# Patient Record
Sex: MALE | Race: WHITE | NOT HISPANIC OR LATINO | Employment: FULL TIME | URBAN - METROPOLITAN AREA
[De-identification: names, ages, dates, MRNs, and addresses within clinical notes are randomized per-mention and may not be internally consistent; named-entity substitution may affect disease eponyms.]

---

## 2020-01-02 ENCOUNTER — HOSPITAL ENCOUNTER (EMERGENCY)
Facility: HOSPITAL | Age: 35
Discharge: HOME | End: 2020-01-03
Attending: EMERGENCY MEDICINE | Admitting: EMERGENCY MEDICINE

## 2020-01-02 ENCOUNTER — APPOINTMENT (OUTPATIENT)
Dept: GENERAL RADIOLOGY | Facility: HOSPITAL | Age: 35
End: 2020-01-02

## 2020-01-02 DIAGNOSIS — J18.9 PNEUMONIA OF LEFT LOWER LOBE DUE TO INFECTIOUS ORGANISM: Primary | ICD-10-CM

## 2020-01-02 DIAGNOSIS — R10.9 ABDOMINAL PAIN, UNSPECIFIED ABDOMINAL LOCATION: ICD-10-CM

## 2020-01-02 LAB
ALBUMIN SERPL-MCNC: 4 G/DL (ref 3.5–5.2)
ALBUMIN/GLOB SERPL: 1.2 G/DL
ALP SERPL-CCNC: 74 U/L (ref 39–117)
ALT SERPL W P-5'-P-CCNC: 28 U/L (ref 1–41)
ANION GAP SERPL CALCULATED.3IONS-SCNC: 12 MMOL/L (ref 5–15)
APTT PPP: 23.6 SECONDS (ref 24–31)
AST SERPL-CCNC: 21 U/L (ref 1–40)
BILIRUB SERPL-MCNC: 0.2 MG/DL (ref 0.2–1.2)
BILIRUB UR QL STRIP: NEGATIVE
BUN BLD-MCNC: 18 MG/DL (ref 6–20)
BUN/CREAT SERPL: 17 (ref 7–25)
CALCIUM SPEC-SCNC: 9.2 MG/DL (ref 8.6–10.5)
CHLORIDE SERPL-SCNC: 100 MMOL/L (ref 98–107)
CLARITY UR: CLEAR
CO2 SERPL-SCNC: 27 MMOL/L (ref 22–29)
COLOR UR: YELLOW
CREAT BLD-MCNC: 1.06 MG/DL (ref 0.76–1.27)
GFR SERPL CREATININE-BSD FRML MDRD: 80 ML/MIN/1.73
GFR SERPL CREATININE-BSD FRML MDRD: 96 ML/MIN/1.73
GLOBULIN UR ELPH-MCNC: 3.4 GM/DL
GLUCOSE BLD-MCNC: 140 MG/DL (ref 65–99)
GLUCOSE UR STRIP-MCNC: NEGATIVE MG/DL
HGB UR QL STRIP.AUTO: NEGATIVE
INR PPP: 0.97 (ref 0.9–1.1)
KETONES UR QL STRIP: NEGATIVE
LEUKOCYTE ESTERASE UR QL STRIP.AUTO: NEGATIVE
LIPASE SERPL-CCNC: 31 U/L (ref 13–60)
NITRITE UR QL STRIP: NEGATIVE
PH UR STRIP.AUTO: 6 [PH] (ref 5–8)
POTASSIUM BLD-SCNC: 4.2 MMOL/L (ref 3.5–5.2)
PROT SERPL-MCNC: 7.4 G/DL (ref 6–8.5)
PROT UR QL STRIP: NEGATIVE
PROTHROMBIN TIME: 10.2 SECONDS (ref 9.6–11.7)
SODIUM BLD-SCNC: 139 MMOL/L (ref 136–145)
SP GR UR STRIP: 1.03 (ref 1–1.03)
TROPONIN T SERPL-MCNC: <0.01 NG/ML (ref 0–0.03)
UROBILINOGEN UR QL STRIP: ABNORMAL

## 2020-01-02 PROCEDURE — 85007 BL SMEAR W/DIFF WBC COUNT: CPT | Performed by: EMERGENCY MEDICINE

## 2020-01-02 PROCEDURE — 84484 ASSAY OF TROPONIN QUANT: CPT | Performed by: EMERGENCY MEDICINE

## 2020-01-02 PROCEDURE — 85610 PROTHROMBIN TIME: CPT | Performed by: EMERGENCY MEDICINE

## 2020-01-02 PROCEDURE — 71046 X-RAY EXAM CHEST 2 VIEWS: CPT

## 2020-01-02 PROCEDURE — 99283 EMERGENCY DEPT VISIT LOW MDM: CPT

## 2020-01-02 PROCEDURE — 80053 COMPREHEN METABOLIC PANEL: CPT | Performed by: EMERGENCY MEDICINE

## 2020-01-02 PROCEDURE — 85730 THROMBOPLASTIN TIME PARTIAL: CPT | Performed by: EMERGENCY MEDICINE

## 2020-01-02 PROCEDURE — 81003 URINALYSIS AUTO W/O SCOPE: CPT | Performed by: EMERGENCY MEDICINE

## 2020-01-02 PROCEDURE — 85025 COMPLETE CBC W/AUTO DIFF WBC: CPT | Performed by: EMERGENCY MEDICINE

## 2020-01-02 PROCEDURE — 83690 ASSAY OF LIPASE: CPT | Performed by: EMERGENCY MEDICINE

## 2020-01-02 PROCEDURE — 93005 ELECTROCARDIOGRAM TRACING: CPT | Performed by: EMERGENCY MEDICINE

## 2020-01-03 ENCOUNTER — APPOINTMENT (OUTPATIENT)
Dept: CT IMAGING | Facility: HOSPITAL | Age: 35
End: 2020-01-03

## 2020-01-03 VITALS
TEMPERATURE: 98 F | WEIGHT: 315 LBS | BODY MASS INDEX: 42.66 KG/M2 | RESPIRATION RATE: 18 BRPM | HEART RATE: 74 BPM | SYSTOLIC BLOOD PRESSURE: 140 MMHG | DIASTOLIC BLOOD PRESSURE: 84 MMHG | OXYGEN SATURATION: 95 % | HEIGHT: 72 IN

## 2020-01-03 LAB
DEPRECATED RDW RBC AUTO: 38.1 FL (ref 37–54)
EOSINOPHIL # BLD MANUAL: 0.15 10*3/MM3 (ref 0–0.4)
EOSINOPHIL NFR BLD MANUAL: 3 % (ref 0.3–6.2)
ERYTHROCYTE [DISTWIDTH] IN BLOOD BY AUTOMATED COUNT: 13.4 % (ref 12.3–15.4)
GIANT PLATELETS: ABNORMAL
HCT VFR BLD AUTO: 38.9 % (ref 37.5–51)
HGB BLD-MCNC: 13.8 G/DL (ref 13–17.7)
LYMPHOCYTES # BLD MANUAL: 1.9 10*3/MM3 (ref 0.7–3.1)
LYMPHOCYTES NFR BLD MANUAL: 2 % (ref 5–12)
LYMPHOCYTES NFR BLD MANUAL: 38 % (ref 19.6–45.3)
MCH RBC QN AUTO: 28.2 PG (ref 26.6–33)
MCHC RBC AUTO-ENTMCNC: 35.5 G/DL (ref 31.5–35.7)
MCV RBC AUTO: 79.4 FL (ref 79–97)
METAMYELOCYTES NFR BLD MANUAL: 1 % (ref 0–0)
MONOCYTES # BLD AUTO: 0.1 10*3/MM3 (ref 0.1–0.9)
MYELOCYTES NFR BLD MANUAL: 2 % (ref 0–0)
NEUTROPHILS # BLD AUTO: 2.7 10*3/MM3 (ref 1.7–7)
NEUTROPHILS NFR BLD MANUAL: 46 % (ref 42.7–76)
NEUTS BAND NFR BLD MANUAL: 8 % (ref 0–5)
PLATELET # BLD AUTO: 245 10*3/MM3 (ref 140–450)
PMV BLD AUTO: 7.4 FL (ref 6–12)
RBC # BLD AUTO: 4.9 10*6/MM3 (ref 4.14–5.8)
RBC MORPH BLD: NORMAL
SCAN SLIDE: NORMAL
WBC MORPH BLD: NORMAL
WBC NRBC COR # BLD: 5 10*3/MM3 (ref 3.4–10.8)

## 2020-01-03 PROCEDURE — 74177 CT ABD & PELVIS W/CONTRAST: CPT

## 2020-01-03 PROCEDURE — 0 IOPAMIDOL PER 1 ML: Performed by: EMERGENCY MEDICINE

## 2020-01-03 RX ORDER — DOXYCYCLINE 100 MG/1
100 CAPSULE ORAL 2 TIMES DAILY
Qty: 20 CAPSULE | Refills: 0 | Status: SHIPPED | OUTPATIENT
Start: 2020-01-03 | End: 2021-06-09

## 2020-01-03 RX ADMIN — SODIUM CHLORIDE 1000 ML: 900 INJECTION, SOLUTION INTRAVENOUS at 00:24

## 2020-01-03 RX ADMIN — IOPAMIDOL 100 ML: 755 INJECTION, SOLUTION INTRAVENOUS at 01:00

## 2020-01-03 NOTE — ED PROVIDER NOTES
Subjective   34-year-old male seen at outlying facility 1 week prior and diagnosed with flu A, took Tamiflu and Tessalon Perls with improvement, fevers resolved.  Patient now complains of right lower quadrant pain with cough and movement, moderate, no associated vomiting or diarrhea as well as persistent nonproductive cough intermittent wheezing and chest tightness.          Review of Systems   Respiratory: Positive for cough, chest tightness, shortness of breath and wheezing.    Gastrointestinal: Positive for abdominal pain.   All other systems reviewed and are negative.      No past medical history on file.    Allergies   Allergen Reactions   • Penicillins Hives       No past surgical history on file.    No family history on file.    Social History     Socioeconomic History   • Marital status:      Spouse name: Not on file   • Number of children: Not on file   • Years of education: Not on file   • Highest education level: Not on file           Objective   Physical Exam   Constitutional: He is oriented to person, place, and time. He appears well-developed and well-nourished.   HENT:   Head: Normocephalic and atraumatic.   Mouth/Throat: Oropharynx is clear and moist.   Eyes: Pupils are equal, round, and reactive to light. Conjunctivae and EOM are normal.   Neck: Normal range of motion. Neck supple.   Cardiovascular: Normal rate, regular rhythm, normal heart sounds and intact distal pulses.   Pulmonary/Chest: Effort normal and breath sounds normal.   Abdominal: Soft. Bowel sounds are normal. He exhibits no distension.   Obese abdomen, moderate right lower quadrant tenderness to palpation without rebound or guarding   Musculoskeletal: Normal range of motion. He exhibits no edema or tenderness.   Neurological: He is alert and oriented to person, place, and time.   Skin: Skin is warm and dry. Capillary refill takes less than 2 seconds.   Psychiatric: He has a normal mood and affect. His behavior is normal.        Procedures           ED Course  ED Course as of Jan 03 0216   Thu Jan 02, 2020   4779 EKG interpretation: Normal sinus rhythm, rate 76, no acute ST change    [JR]      ED Course User Index  [JR] Enmanuel Stone MD                                               [facility]  Number of Diagnoses or Management Options  Abdominal pain, unspecified abdominal location:   Pneumonia of left lower lobe due to infectious organism (CMS/HCC):   Diagnosis management comments: Results for orders placed or performed during the hospital encounter of 01/02/20  -Comprehensive Metabolic Panel       Result                      Value             Ref Range           Glucose                     140 (H)           65 - 99 mg/dL       BUN                         18                6 - 20 mg/dL        Creatinine                  1.06              0.76 - 1.27 *       Sodium                      139               136 - 145 mm*       Potassium                   4.2               3.5 - 5.2 mm*       Chloride                    100               98 - 107 mmo*       CO2                         27.0              22.0 - 29.0 *       Calcium                     9.2               8.6 - 10.5 m*       Total Protein               7.4               6.0 - 8.5 g/*       Albumin                     4.00              3.50 - 5.20 *       ALT (SGPT)                  28                1 - 41 U/L          AST (SGOT)                  21                1 - 40 U/L          Alkaline Phosphatase        74                39 - 117 U/L        Total Bilirubin             0.2               0.2 - 1.2 mg*       eGFR Non  Amer       80                >60 mL/min/1*       eGFR   Amer          96                >60 mL/min/1*       Globulin                    3.4               gm/dL               A/G Ratio                   1.2               g/dL                BUN/Creatinine Ratio        17.0              7.0 - 25.0          Anion Gap                   12.0               5.0 - 15.0 m*  -Protime-INR       Result                      Value             Ref Range           Protime                     10.2              9.6 - 11.7 S*       INR                         0.97              0.90 - 1.10    -aPTT       Result                      Value             Ref Range           PTT                         23.6 (L)          24.0 - 31.0 *  -Lipase       Result                      Value             Ref Range           Lipase                      31                13 - 60 U/L    -Urinalysis With Culture If Indicated - Urine, Clean Catch       Result                      Value             Ref Range           Color, UA                   Yellow            Yellow, Straw       Appearance, UA              Clear             Clear               pH, UA                      6.0               5.0 - 8.0           Specific Gravity, UA        1.034 (H)         1.005 - 1.030       Glucose, UA                 Negative          Negative            Ketones, UA                 Negative          Negative            Bilirubin, UA               Negative          Negative            Blood, UA                   Negative          Negative            Protein, UA                 Negative          Negative            Leuk Esterase, UA           Negative          Negative            Nitrite, UA                 Negative          Negative            Urobilinogen, UA            1.0 E.U./dL       0.2 - 1.0 E.*  -Troponin       Result                      Value             Ref Range           Troponin T                  <0.010            0.000 - 0.03*  -CBC Auto Differential       Result                      Value             Ref Range           WBC                         5.00              3.40 - 10.80*       RBC                         4.90              4.14 - 5.80 *       Hemoglobin                  13.8              13.0 - 17.7 *       Hematocrit                  38.9              37.5 - 51.0 %       MCV                          79.4              79.0 - 97.0 *       MCH                         28.2              26.6 - 33.0 *       MCHC                        35.5              31.5 - 35.7 *       RDW                         13.4              12.3 - 15.4 %       RDW-SD                      38.1              37.0 - 54.0 *       MPV                         7.4               6.0 - 12.0 fL       Platelets                   245               140 - 450 10*  -Scan Slide       Result                      Value             Ref Range           Scan Slide                                                   -Manual Differential       Result                      Value             Ref Range           Neutrophil %                46.0              42.7 - 76.0 %       Lymphocyte %                38.0              19.6 - 45.3 %       Monocyte %                  2.0 (L)           5.0 - 12.0 %        Eosinophil %                3.0               0.3 - 6.2 %         Bands %                     8.0 (H)           0.0 - 5.0 %         Metamyelocyte %             1.0 (H)           0.0 - 0.0 %         Myelocyte %                 2.0 (H)           0.0 - 0.0 %         Neutrophils Absolute        2.70              1.70 - 7.00 *       Lymphocytes Absolute        1.90              0.70 - 3.10 *       Monocytes Absolute          0.10              0.10 - 0.90 *       Eosinophils Absolute        0.15              0.00 - 0.40 *       RBC Morphology              Normal            Normal              WBC Morphology              Normal            Normal              Giant Platelets             Slight/1+         None Seen      Ct Abdomen Pelvis With Contrast    Result Date: 1/2/2020  1. Subtle patchy groundglass, nodular type infiltrates in the left lower lobe most likely representing mild infectious bronchopneumonia. 2. Hepatic steatosis. 3. Moderate constipation. 4. Probable Scheuermann's disease in the lower thoracic spine with associated moderate degenerative changes. Slot  64 Electronically signed by:  Kendall Dixon M.D.  1/2/2020 11:32 PM      Well, no respiratory distress, patient tells me he does have a PCP for follow up.       Amount and/or Complexity of Data Reviewed  Clinical lab tests: reviewed  Tests in the radiology section of CPT®: reviewed  Tests in the medicine section of CPT®: reviewed  Independent visualization of images, tracings, or specimens: yes        Final diagnoses:   Pneumonia of left lower lobe due to infectious organism (CMS/HCC)   Abdominal pain, unspecified abdominal location            Enmanuel Stone MD  01/03/20 0216

## 2020-01-03 NOTE — ED NOTES
Oral contrast provided. Pt instructed to drink then let staff know when he is finished.      Samreen Rob LPN  01/02/20 4120

## 2020-12-03 ENCOUNTER — APPOINTMENT (OUTPATIENT)
Dept: CT IMAGING | Facility: HOSPITAL | Age: 35
End: 2020-12-03

## 2020-12-03 ENCOUNTER — HOSPITAL ENCOUNTER (EMERGENCY)
Facility: HOSPITAL | Age: 35
Discharge: HOME | End: 2020-12-03
Attending: EMERGENCY MEDICINE | Admitting: EMERGENCY MEDICINE

## 2020-12-03 VITALS
DIASTOLIC BLOOD PRESSURE: 65 MMHG | BODY MASS INDEX: 42.66 KG/M2 | HEART RATE: 89 BPM | WEIGHT: 315 LBS | HEIGHT: 72 IN | OXYGEN SATURATION: 99 % | TEMPERATURE: 97.7 F | SYSTOLIC BLOOD PRESSURE: 119 MMHG | RESPIRATION RATE: 19 BRPM

## 2020-12-03 DIAGNOSIS — J45.41 MODERATE PERSISTENT ASTHMATIC BRONCHITIS WITH ACUTE EXACERBATION: Primary | ICD-10-CM

## 2020-12-03 LAB
ALBUMIN SERPL-MCNC: 3.9 G/DL (ref 3.5–5.2)
ALBUMIN/GLOB SERPL: 1.4 G/DL
ALP SERPL-CCNC: 74 U/L (ref 39–117)
ALT SERPL W P-5'-P-CCNC: 29 U/L (ref 1–41)
ANION GAP SERPL CALCULATED.3IONS-SCNC: 9 MMOL/L (ref 5–15)
ANISOCYTOSIS BLD QL: ABNORMAL
AST SERPL-CCNC: 21 U/L (ref 1–40)
BASOPHILS # BLD MANUAL: 0.08 10*3/MM3 (ref 0–0.2)
BASOPHILS NFR BLD AUTO: 1 % (ref 0–1.5)
BILIRUB SERPL-MCNC: 0.3 MG/DL (ref 0–1.2)
BUN SERPL-MCNC: 17 MG/DL (ref 6–20)
BUN/CREAT SERPL: 21 (ref 7–25)
CALCIUM SPEC-SCNC: 9 MG/DL (ref 8.6–10.5)
CHLORIDE SERPL-SCNC: 100 MMOL/L (ref 98–107)
CO2 SERPL-SCNC: 26 MMOL/L (ref 22–29)
CREAT SERPL-MCNC: 0.81 MG/DL (ref 0.76–1.27)
CRP SERPL-MCNC: 0.35 MG/DL (ref 0–0.5)
DEPRECATED RDW RBC AUTO: 39.8 FL (ref 37–54)
ERYTHROCYTE [DISTWIDTH] IN BLOOD BY AUTOMATED COUNT: 14.3 % (ref 12.3–15.4)
GFR SERPL CREATININE-BSD FRML MDRD: 108 ML/MIN/1.73
GLOBULIN UR ELPH-MCNC: 2.7 GM/DL
GLUCOSE SERPL-MCNC: 111 MG/DL (ref 65–99)
HCT VFR BLD AUTO: 44.3 % (ref 37.5–51)
HGB BLD-MCNC: 15.1 G/DL (ref 13–17.7)
LYMPHOCYTES # BLD MANUAL: 0.98 10*3/MM3 (ref 0.7–3.1)
LYMPHOCYTES NFR BLD MANUAL: 13 % (ref 19.6–45.3)
LYMPHOCYTES NFR BLD MANUAL: 6 % (ref 5–12)
MCH RBC QN AUTO: 26.8 PG (ref 26.6–33)
MCHC RBC AUTO-ENTMCNC: 34 G/DL (ref 31.5–35.7)
MCV RBC AUTO: 78.8 FL (ref 79–97)
MONOCYTES # BLD AUTO: 0.45 10*3/MM3 (ref 0.1–0.9)
NEUTROPHILS # BLD AUTO: 5.93 10*3/MM3 (ref 1.7–7)
NEUTROPHILS NFR BLD MANUAL: 75 % (ref 42.7–76)
NEUTS BAND NFR BLD MANUAL: 4 % (ref 0–5)
PLAT MORPH BLD: NORMAL
PLATELET # BLD AUTO: 212 10*3/MM3 (ref 140–450)
PMV BLD AUTO: 7.5 FL (ref 6–12)
POIKILOCYTOSIS BLD QL SMEAR: ABNORMAL
POTASSIUM SERPL-SCNC: 4.6 MMOL/L (ref 3.5–5.2)
PROT SERPL-MCNC: 6.6 G/DL (ref 6–8.5)
RBC # BLD AUTO: 5.62 10*6/MM3 (ref 4.14–5.8)
SCAN SLIDE: NORMAL
SODIUM SERPL-SCNC: 135 MMOL/L (ref 136–145)
VARIANT LYMPHS NFR BLD MANUAL: 1 % (ref 0–5)
WBC # BLD AUTO: 7.5 10*3/MM3 (ref 3.4–10.8)
WBC MORPH BLD: NORMAL

## 2020-12-03 PROCEDURE — 0 IOPAMIDOL PER 1 ML: Performed by: EMERGENCY MEDICINE

## 2020-12-03 PROCEDURE — 99283 EMERGENCY DEPT VISIT LOW MDM: CPT

## 2020-12-03 PROCEDURE — 71275 CT ANGIOGRAPHY CHEST: CPT

## 2020-12-03 PROCEDURE — 87040 BLOOD CULTURE FOR BACTERIA: CPT | Performed by: EMERGENCY MEDICINE

## 2020-12-03 PROCEDURE — 93005 ELECTROCARDIOGRAM TRACING: CPT | Performed by: EMERGENCY MEDICINE

## 2020-12-03 PROCEDURE — 85025 COMPLETE CBC W/AUTO DIFF WBC: CPT | Performed by: EMERGENCY MEDICINE

## 2020-12-03 PROCEDURE — 25010000002 DEXAMETHASONE PER 1 MG: Performed by: EMERGENCY MEDICINE

## 2020-12-03 PROCEDURE — 96374 THER/PROPH/DIAG INJ IV PUSH: CPT

## 2020-12-03 PROCEDURE — 80053 COMPREHEN METABOLIC PANEL: CPT | Performed by: EMERGENCY MEDICINE

## 2020-12-03 PROCEDURE — 93005 ELECTROCARDIOGRAM TRACING: CPT

## 2020-12-03 PROCEDURE — 85007 BL SMEAR W/DIFF WBC COUNT: CPT | Performed by: EMERGENCY MEDICINE

## 2020-12-03 PROCEDURE — 86140 C-REACTIVE PROTEIN: CPT | Performed by: EMERGENCY MEDICINE

## 2020-12-03 RX ORDER — DEXAMETHASONE SODIUM PHOSPHATE 4 MG/ML
8 INJECTION, SOLUTION INTRA-ARTICULAR; INTRALESIONAL; INTRAMUSCULAR; INTRAVENOUS; SOFT TISSUE ONCE
Status: COMPLETED | OUTPATIENT
Start: 2020-12-03 | End: 2020-12-03

## 2020-12-03 RX ORDER — DOXYCYCLINE HYCLATE 100 MG/1
100 TABLET, DELAYED RELEASE ORAL 2 TIMES DAILY
Qty: 10 TABLET | Refills: 0 | Status: SHIPPED | OUTPATIENT
Start: 2020-12-03 | End: 2021-06-09

## 2020-12-03 RX ORDER — HYDROCHLOROTHIAZIDE 12.5 MG/1
12.5 TABLET ORAL DAILY
COMMUNITY
End: 2021-06-09 | Stop reason: ALTCHOICE

## 2020-12-03 RX ORDER — PREDNISONE 20 MG/1
40 TABLET ORAL DAILY
Qty: 10 TABLET | Refills: 0 | Status: SHIPPED | OUTPATIENT
Start: 2020-12-03 | End: 2021-06-09

## 2020-12-03 RX ORDER — LISINOPRIL 10 MG/1
10 TABLET ORAL DAILY
COMMUNITY
End: 2021-06-09 | Stop reason: DRUGHIGH

## 2020-12-03 RX ORDER — ATORVASTATIN CALCIUM 40 MG/1
20 TABLET, FILM COATED ORAL DAILY
COMMUNITY
End: 2021-06-09 | Stop reason: DRUGHIGH

## 2020-12-03 RX ADMIN — DEXAMETHASONE SODIUM PHOSPHATE 8 MG: 4 INJECTION, SOLUTION INTRAMUSCULAR; INTRAVENOUS at 16:47

## 2020-12-03 RX ADMIN — IOPAMIDOL 100 ML: 755 INJECTION, SOLUTION INTRAVENOUS at 19:19

## 2020-12-03 RX ADMIN — SODIUM CHLORIDE 1000 ML: 9 INJECTION, SOLUTION INTRAVENOUS at 16:41

## 2020-12-03 NOTE — ED NOTES
Covid 20 days ago. Had gotten better and went back to work. The past 2 days he says he wakes up gasping and O2 sats dropped. Has history of asthma and sleep apnea.      Bella Alvarado, RN  12/03/20 9477

## 2020-12-04 LAB — QT INTERVAL: 328 MS

## 2020-12-04 NOTE — ED PROVIDER NOTES
Subjective   85-year-old male suffered an episode of COVID-19 started 20 days ago.  The patient states that he improved and was near baseline when in the last 3 days he suddenly developed increasing shortness of breath.  He states he has had some wheezing with it.  He reports that he has had no nausea vomiting or diarrhea reports no change or loss in taste or smell.  Reports no leg pain or swelling.  He states he has been using his nebulizer with some improvement today          Review of Systems   Constitutional: Positive for fatigue. Negative for chills, diaphoresis and fever.   HENT: Negative for trouble swallowing.    Eyes: Negative for visual disturbance.   Respiratory: Positive for cough, chest tightness, shortness of breath and wheezing.    Cardiovascular: Negative for palpitations and leg swelling.   Gastrointestinal: Negative for diarrhea.   Hematological: Bruises/bleeds easily.   All other systems reviewed and are negative.      Past Medical History:   Diagnosis Date   • Asthma    • Hyperlipidemia    • Hypertension        Allergies   Allergen Reactions   • Penicillins Hives       History reviewed. No pertinent surgical history.    History reviewed. No pertinent family history.    Social History     Socioeconomic History   • Marital status:      Spouse name: Not on file   • Number of children: Not on file   • Years of education: Not on file   • Highest education level: Not on file   Tobacco Use   • Smoking status: Never Smoker   Substance and Sexual Activity   • Alcohol use: Never     Frequency: Never   • Drug use: Never   • Sexual activity: Defer           Objective   Physical Exam  Alert Sincere Coma Scale 15 no acute respiratory distress   HEENT: Pupils equal and reactive to light. Conjunctivae are not injected. normal tympanic membranes. Oropharynx and nares are normal.   Neck: Supple. Midline trachea. No JVD. No goiter.   Chest: Despite the lack of severe distress the patient has extensive  rhonchi and expiratory wheezes bilaterally and equal breath sounds bilaterally regular rate and rhythm without murmur or rub.   Abdomen: Positive bowel sounds nontender nondistended. No rebound or peritoneal signs. No CVA tenderness.   Extremities no clubbing cyanosis or edema motor sensory exam is normal the full range of motion is intact   skin: Warm and dry, no rashes or petechia.   Lymphatic: No regional lymphadenopathy. No calf pain, swelling or Wu's sign    Procedures           ED Course  ED Course as of Dec 03 2020   Thu Dec 03, 2020   2011 I examined the patient using the appropriate personal protective equipment.          [TH]      ED Course User Index  [TH] Hector Gallego MD                                         Labs Reviewed   COMPREHENSIVE METABOLIC PANEL - Abnormal; Notable for the following components:       Result Value    Glucose 111 (*)     Sodium 135 (*)     All other components within normal limits    Narrative:     GFR Normal >60  Chronic Kidney Disease <60  Kidney Failure <15     CBC WITH AUTO DIFFERENTIAL - Abnormal; Notable for the following components:    MCV 78.8 (*)     All other components within normal limits   MANUAL DIFFERENTIAL - Abnormal; Notable for the following components:    Lymphocyte % 13.0 (*)     All other components within normal limits   C-REACTIVE PROTEIN - Normal   BLOOD CULTURE   BLOOD CULTURE   SCAN SLIDE   CBC AND DIFFERENTIAL    Narrative:     The following orders were created for panel order CBC & Differential.  Procedure                               Abnormality         Status                     ---------                               -----------         ------                     Scan Slide[[id number]]                                       Final result               CBC Auto Differential[[id number]]        Abnormal            Final result                 Please view results for these tests on the individual orders.     Medications   dexamethasone (DECADRON)  injection 8 mg (8 mg Intravenous Given 12/3/20 1647)   sodium chloride 0.9 % bolus 1,000 mL (0 mL Intravenous Stopped 12/3/20 1919)   iopamidol (ISOVUE-370) 76 % injection 100 mL (100 mL Intravenous Given 12/3/20 1919)     Ct Chest Pulmonary Embolism    Result Date: 12/3/2020  The contrast bolus is suboptimal. No large pulmonary artery emboli are seen. No acute abnormality on CT chest.    Electronically Signed By-Mari Arboleda MD On:12/3/2020 7:23 PM This report was finalized on [id number] by  Mari Arboleda MD.        MDM  Number of Diagnoses or Management Options     Amount and/or Complexity of Data Reviewed  Clinical lab tests: reviewed  Tests in the radiology section of CPT®: reviewed  Tests in the medicine section of CPT®: reviewed  Review and summarize past medical records: yes  Independent visualization of images, tracings, or specimens: yes    Risk of Complications, Morbidity, and/or Mortality  Presenting problems: high  Diagnostic procedures: high  Management options: high  General comments: Patient was advised test results who placed on prednisone for the next 5 days as well as doxycycline.  The patient will be placed on Combivent.  His wheezing cleared while in the emergency department he was agreeable to this plan of treatment        Final diagnoses:   Moderate persistent asthmatic bronchitis with acute exacerbation            Hector Gallego MD  12/03/20 2020

## 2020-12-04 NOTE — DISCHARGE INSTRUCTIONS
Rest as much as possible next 3 days  Tylenol as needed for fever  you your nebulizer use as needed  Plenty of fluids  Medication as directed

## 2020-12-08 LAB
BACTERIA SPEC AEROBE CULT: NORMAL
BACTERIA SPEC AEROBE CULT: NORMAL

## 2021-04-07 ENCOUNTER — LAB (OUTPATIENT)
Dept: LAB | Facility: HOSPITAL | Age: 36
End: 2021-04-07

## 2021-04-07 ENCOUNTER — TRANSCRIBE ORDERS (OUTPATIENT)
Dept: LAB | Facility: HOSPITAL | Age: 36
End: 2021-04-07

## 2021-04-07 DIAGNOSIS — J30.5 ALLERGIC RHINITIS DUE TO FOOD: ICD-10-CM

## 2021-04-07 DIAGNOSIS — J30.5 ALLERGIC RHINITIS DUE TO FOOD: Primary | ICD-10-CM

## 2021-04-07 LAB
HOLD SPECIMEN: NORMAL
HOLD SPECIMEN: NORMAL

## 2021-04-07 PROCEDURE — 86003 ALLG SPEC IGE CRUDE XTRC EA: CPT

## 2021-04-07 PROCEDURE — 36415 COLL VENOUS BLD VENIPUNCTURE: CPT

## 2021-04-10 LAB
CLAM IGE QN: <0.1 KU/L
CODFISH IGE QN: <0.1 KU/L
CONV CLASS DESCRIPTION: NORMAL
CRAB IGE QN: <0.1 KU/L
FLOUNDER IGE QN: <0.1 KU/L
HALIBUT IGE QN: <0.1 KU/L
LOBSTER IGE QN: <0.1 KU/L
OYSTER IGE QN: <0.1 KU/L
SCALLOP IGE QN: <0.1 KU/L
SHRIMP IGE QN: <0.1 KU/L

## 2021-04-12 LAB
CATFISH IGE QN: <0.1 KU/L
SALMON IGE QN: <0.1 KU/L
TUNA IGE QN: <0.1 KU/L

## 2021-06-09 ENCOUNTER — OFFICE VISIT (OUTPATIENT)
Dept: FAMILY MEDICINE CLINIC | Facility: CLINIC | Age: 36
End: 2021-06-09

## 2021-06-09 VITALS
TEMPERATURE: 97.5 F | SYSTOLIC BLOOD PRESSURE: 136 MMHG | HEART RATE: 71 BPM | OXYGEN SATURATION: 97 % | BODY MASS INDEX: 44.1 KG/M2 | HEIGHT: 71 IN | WEIGHT: 315 LBS | RESPIRATION RATE: 18 BRPM | DIASTOLIC BLOOD PRESSURE: 84 MMHG

## 2021-06-09 DIAGNOSIS — E53.8 VITAMIN B12 DEFICIENCY: ICD-10-CM

## 2021-06-09 DIAGNOSIS — I10 ESSENTIAL HYPERTENSION: ICD-10-CM

## 2021-06-09 DIAGNOSIS — R79.89 LOW TESTOSTERONE IN MALE: Primary | ICD-10-CM

## 2021-06-09 DIAGNOSIS — E78.2 MIXED HYPERLIPIDEMIA: ICD-10-CM

## 2021-06-09 DIAGNOSIS — R71.8 RBC ABNORMALITY: ICD-10-CM

## 2021-06-09 DIAGNOSIS — R53.83 OTHER FATIGUE: ICD-10-CM

## 2021-06-09 DIAGNOSIS — R73.9 ELEVATED BLOOD SUGAR: ICD-10-CM

## 2021-06-09 DIAGNOSIS — E55.9 VITAMIN D DEFICIENCY: ICD-10-CM

## 2021-06-09 PROBLEM — E66.01 MORBID OBESITY DUE TO EXCESS CALORIES: Status: ACTIVE | Noted: 2017-01-16

## 2021-06-09 LAB — HBA1C MFR BLD: 6.2 % (ref 3.5–5.6)

## 2021-06-09 PROCEDURE — 99204 OFFICE O/P NEW MOD 45 MIN: CPT | Performed by: NURSE PRACTITIONER

## 2021-06-09 PROCEDURE — 82607 VITAMIN B-12: CPT | Performed by: NURSE PRACTITIONER

## 2021-06-09 PROCEDURE — 80061 LIPID PANEL: CPT | Performed by: NURSE PRACTITIONER

## 2021-06-09 PROCEDURE — 84439 ASSAY OF FREE THYROXINE: CPT | Performed by: NURSE PRACTITIONER

## 2021-06-09 PROCEDURE — 82306 VITAMIN D 25 HYDROXY: CPT | Performed by: NURSE PRACTITIONER

## 2021-06-09 PROCEDURE — 84403 ASSAY OF TOTAL TESTOSTERONE: CPT | Performed by: NURSE PRACTITIONER

## 2021-06-09 PROCEDURE — 80053 COMPREHEN METABOLIC PANEL: CPT | Performed by: NURSE PRACTITIONER

## 2021-06-09 PROCEDURE — 84443 ASSAY THYROID STIM HORMONE: CPT | Performed by: NURSE PRACTITIONER

## 2021-06-09 PROCEDURE — 83036 HEMOGLOBIN GLYCOSYLATED A1C: CPT | Performed by: NURSE PRACTITIONER

## 2021-06-09 RX ORDER — TRIAMTERENE AND HYDROCHLOROTHIAZIDE 37.5; 25 MG/1; MG/1
0.5 TABLET ORAL 2 TIMES DAILY
Qty: 90 TABLET | Refills: 1 | Status: SHIPPED | OUTPATIENT
Start: 2021-06-09 | End: 2021-11-30 | Stop reason: SDUPTHER

## 2021-06-09 RX ORDER — ATORVASTATIN CALCIUM 20 MG/1
20 TABLET, FILM COATED ORAL NIGHTLY
COMMUNITY
End: 2021-09-27 | Stop reason: SDUPTHER

## 2021-06-09 RX ORDER — TESTOSTERONE CYPIONATE 200 MG/ML
1 VIAL (ML) INTRAMUSCULAR
COMMUNITY
End: 2021-07-07 | Stop reason: ALTCHOICE

## 2021-06-09 RX ORDER — TRIAMTERENE AND HYDROCHLOROTHIAZIDE 37.5; 25 MG/1; MG/1
0.5 TABLET ORAL 2 TIMES DAILY
COMMUNITY
End: 2021-06-09 | Stop reason: SDUPTHER

## 2021-06-09 RX ORDER — CLONAZEPAM 0.25 MG/1
0.25 TABLET, ORALLY DISINTEGRATING ORAL 2 TIMES DAILY PRN
COMMUNITY
End: 2021-09-27 | Stop reason: SDUPTHER

## 2021-06-09 RX ORDER — LISINOPRIL 20 MG/1
20 TABLET ORAL DAILY
COMMUNITY
End: 2021-10-12 | Stop reason: SDUPTHER

## 2021-06-09 RX ORDER — ALBUTEROL SULFATE 90 UG/1
2 AEROSOL, METERED RESPIRATORY (INHALATION) EVERY 4 HOURS PRN
COMMUNITY

## 2021-06-09 NOTE — PROGRESS NOTES
Chief Complaint   Patient presents with   • WANTS TO DISCUSS TESTOSTERONE     WOULD LIKE TO BE REFERRED TO A SPECIALIST DUE TO TESTOSTERONE LEVELS KEEP DROPPING, AND THICKENING HIS BLOOD    • Hypertension     PATIENT STATES THAT IT HAS BEEN HIGHER THAN NORMAL LATELY         Subjective   Stephon Martini is a 35 y.o.  male who presents today for   • WANTS TO DISCUSS TESTOSTERONE    WOULD LIKE TO BE REFERRED TO A SPECIALIST DUE TO TESTOSTERONE LEVELS KEEP DROPPING, AND THICKENING discuss with patient as to the concern of continuing his testosterone he reports that he has trouble with severe fatigue if he doesn't take the testosterone daily. Need referral to urology and hematology   • Hypertension    PATIENT STATES THAT IT HAS BEEN HIGHER THAN NORMAL LATELY  Blood pressure reviewed. Labs today  Is agreeable has no other concerns today       Stephon Martini  has a past medical history of Anxiety, Asthma, Carpal tunnel syndrome, bilateral, COVID-19, Hyperlipidemia, Hypertension, Kidney stone, Low testosterone in male, Obesity, and FANTA (obstructive sleep apnea).    Allergies   Allergen Reactions   • Penicillins Hives       Current Outpatient Medications:   •  albuterol sulfate HFA (Ventolin HFA) 108 (90 Base) MCG/ACT inhaler, Inhale 2 puffs Every 4 (Four) Hours As Needed for Wheezing or Shortness of Air., Disp: , Rfl:   •  atorvastatin (LIPITOR) 20 MG tablet, Take 20 mg by mouth Every Night., Disp: , Rfl:   •  clonazePAM (KlonoPIN) 0.25 MG disintegrating tablet, Place 0.25 mg on the tongue 2 (Two) Times a Day As Needed for Anxiety., Disp: , Rfl:   •  lisinopril (PRINIVIL,ZESTRIL) 20 MG tablet, Take 20 mg by mouth Daily., Disp: , Rfl:   •  Testosterone Cypionate 200 MG/ML solution, Inject 1 mL into the appropriate muscle as directed by prescriber Every 14 (Fourteen) Days., Disp: , Rfl:   •  triamterene-hydrochlorothiazide (MAXZIDE-25) 37.5-25 MG per tablet, Take 0.5 tablets by mouth 2 (two) times a day.,  Disp: 90 tablet, Rfl: 1  Past Medical History:   Diagnosis Date   • Anxiety    • Asthma    • Carpal tunnel syndrome, bilateral    • COVID-19    • Hyperlipidemia    • Hypertension    • Kidney stone    • Low testosterone in male    • Obesity    • FANTA (obstructive sleep apnea)      History reviewed. No pertinent surgical history.  Social History     Socioeconomic History   • Marital status:      Spouse name: Not on file   • Number of children: Not on file   • Years of education: Not on file   • Highest education level: Not on file   Tobacco Use   • Smoking status: Former Smoker     Packs/day: 1.00     Years: 5.00     Pack years: 5.00     Types: Cigarettes     Start date:      Quit date:      Years since quittin.4   • Smokeless tobacco: Never Used   Substance and Sexual Activity   • Alcohol use: Not Currently     Comment: HX OF ALCOHOLISM    • Drug use: Never   • Sexual activity: Defer     Family History   Problem Relation Age of Onset   • Heart attack Mother    • Hyperlipidemia Father    • Hypertension Father    • Stroke Father    • Hyperthyroidism Sister    • Parkinsonism Maternal Grandfather    • Breast cancer Paternal Grandmother    • Stroke Paternal Grandfather    • Nephrolithiasis Paternal Grandfather      PHQ-2 Depression Screening  Little interest or pleasure in doing things? 0   Feeling down, depressed, or hopeless? 0   PHQ-2 Total Score 0     PHQ-9 Depression Screening  Little interest or pleasure in doing things? 0   Feeling down, depressed, or hopeless? 0   Trouble falling or staying asleep, or sleeping too much?     Feeling tired or having little energy?     Poor appetite or overeating?     Feeling bad about yourself - or that you are a failure or have let yourself or your family down?     Trouble concentrating on things, such as reading the newspaper or watching television?     Moving or speaking so slowly that other people could have noticed? Or the opposite - being so fidgety or  "restless that you have been moving around a lot more than usual?     Thoughts that you would be better off dead, or of hurting yourself in some way?     PHQ-9 Total Score 0   If you checked off any problems, how difficult have these problems made it for you to do your work, take care of things at home, or get along with other people?         Family history, surgical history, past medical history, Allergies and med's reviewed with patient today and updated in [employer] EMR.     ROS:  Review of Systems   All other systems reviewed and are negative.      OBJECTIVE:  Vitals:    06/09/21 0958   BP: 136/84   BP Location: Right arm   Patient Position: Sitting   Cuff Size: Large Adult   Pulse: 71   Resp: 18   Temp: 97.5 °F (36.4 °C)   TempSrc: Infrared   SpO2: 97%   Weight: (!) 153 kg (336 lb 6.4 oz)   Height: 180.3 cm (71\")     Body mass index is 46.92 kg/m².  Physical Exam  Vitals and nursing note reviewed.   Constitutional:       Appearance: Normal appearance. He is well-developed.   HENT:      Head: Normocephalic.   Eyes:      Pupils: Pupils are equal, round, and reactive to light.   Cardiovascular:      Rate and Rhythm: Normal rate and regular rhythm.   Pulmonary:      Effort: Pulmonary effort is normal.      Breath sounds: Normal breath sounds.   Abdominal:      General: Bowel sounds are normal.      Palpations: Abdomen is soft.   Musculoskeletal:         General: Normal range of motion.      Cervical back: Normal range of motion and neck supple.   Skin:     General: Skin is warm and dry.   Neurological:      Mental Status: He is alert and oriented to person, place, and time.   Psychiatric:         Behavior: Behavior normal.         Thought Content: Thought content normal.         Judgment: Judgment normal.         ASSESSMENT/ PLAN:    Diagnoses and all orders for this visit:    1. Low testosterone in male (Primary)  -     Ambulatory Referral to Urology  -     Testosterone; Future  -     Testosterone    2. Essential " hypertension    3. Vitamin B12 deficiency  -     Vitamin B12; Future  -     Vitamin B12    4. Vitamin D deficiency  -     Vitamin D 25 Hydroxy; Future  -     Vitamin D 25 Hydroxy    5. Mixed hyperlipidemia  -     Comprehensive Metabolic Panel; Future  -     Lipid Panel; Future  -     Comprehensive Metabolic Panel  -     Lipid Panel    6. Other fatigue  -     TSH; Future  -     T4, Free; Future  -     CBC & Differential; Future  -     TSH  -     T4, Free  -     CBC & Differential    7. Elevated blood sugar  -     Hemoglobin A1c; Future  -     Hemoglobin A1c    8. RBC abnormality  -     Ambulatory Referral to Hematology    Other orders  -     triamterene-hydrochlorothiazide (MAXZIDE-25) 37.5-25 MG per tablet; Take 0.5 tablets by mouth 2 (two) times a day.  Dispense: 90 tablet; Refill: 1        Orders Placed Today:     New Medications Ordered This Visit   Medications   • triamterene-hydrochlorothiazide (MAXZIDE-25) 37.5-25 MG per tablet     Sig: Take 0.5 tablets by mouth 2 (two) times a day.     Dispense:  90 tablet     Refill:  1        Management Plan:   Labs today  Refills on blood pressure medications  Referral to urology and hematology he is agreeable     An After Visit Summary was printed and given to the patient at discharge.    Follow-up: Return in about 2 weeks (around 6/23/2021).    LINDA Strong 6/9/2021 15:17 EDT  This note was electronically signed.

## 2021-06-10 ENCOUNTER — HOSPITAL ENCOUNTER (EMERGENCY)
Facility: HOSPITAL | Age: 36
Discharge: HOME | End: 2021-06-10
Admitting: EMERGENCY MEDICINE

## 2021-06-10 VITALS
TEMPERATURE: 97.8 F | HEART RATE: 77 BPM | OXYGEN SATURATION: 97 % | RESPIRATION RATE: 16 BRPM | BODY MASS INDEX: 42.66 KG/M2 | HEIGHT: 72 IN | DIASTOLIC BLOOD PRESSURE: 92 MMHG | SYSTOLIC BLOOD PRESSURE: 153 MMHG | WEIGHT: 315 LBS

## 2021-06-10 DIAGNOSIS — K08.89 PAIN, DENTAL: Primary | ICD-10-CM

## 2021-06-10 LAB
25(OH)D3 SERPL-MCNC: 19.8 NG/ML (ref 30–100)
ALBUMIN SERPL-MCNC: 4.8 G/DL (ref 3.5–5.2)
ALBUMIN/GLOB SERPL: 1.7 G/DL
ALP SERPL-CCNC: 81 U/L (ref 39–117)
ALT SERPL W P-5'-P-CCNC: 29 U/L (ref 1–41)
ANION GAP SERPL CALCULATED.3IONS-SCNC: 9.8 MMOL/L (ref 5–15)
AST SERPL-CCNC: 24 U/L (ref 1–40)
BILIRUB SERPL-MCNC: 0.5 MG/DL (ref 0–1.2)
BUN SERPL-MCNC: 15 MG/DL (ref 6–20)
BUN/CREAT SERPL: 20.3 (ref 7–25)
CALCIUM SPEC-SCNC: 9.9 MG/DL (ref 8.6–10.5)
CHLORIDE SERPL-SCNC: 99 MMOL/L (ref 98–107)
CHOLEST SERPL-MCNC: 208 MG/DL (ref 0–200)
CO2 SERPL-SCNC: 27.2 MMOL/L (ref 22–29)
CREAT SERPL-MCNC: 0.74 MG/DL (ref 0.76–1.27)
GFR SERPL CREATININE-BSD FRML MDRD: 120 ML/MIN/1.73
GLOBULIN UR ELPH-MCNC: 2.9 GM/DL
GLUCOSE SERPL-MCNC: 91 MG/DL (ref 65–99)
HDLC SERPL-MCNC: 45 MG/DL (ref 40–60)
LDLC SERPL CALC-MCNC: 128 MG/DL (ref 0–100)
LDLC/HDLC SERPL: 2.76 {RATIO}
POTASSIUM SERPL-SCNC: 4.5 MMOL/L (ref 3.5–5.2)
PROT SERPL-MCNC: 7.7 G/DL (ref 6–8.5)
SODIUM SERPL-SCNC: 136 MMOL/L (ref 136–145)
T4 FREE SERPL-MCNC: 1.05 NG/DL (ref 0.93–1.7)
TESTOST SERPL-MCNC: 280 NG/DL (ref 249–836)
TRIGL SERPL-MCNC: 195 MG/DL (ref 0–150)
TSH SERPL DL<=0.05 MIU/L-ACNC: 1.09 UIU/ML (ref 0.27–4.2)
VIT B12 BLD-MCNC: 458 PG/ML (ref 211–946)
VLDLC SERPL-MCNC: 35 MG/DL (ref 5–40)

## 2021-06-10 PROCEDURE — 99283 EMERGENCY DEPT VISIT LOW MDM: CPT

## 2021-06-10 RX ORDER — NAPROXEN 500 MG/1
500 TABLET ORAL 2 TIMES DAILY PRN
Qty: 10 TABLET | Refills: 0 | Status: SHIPPED | OUTPATIENT
Start: 2021-06-10 | End: 2021-06-10 | Stop reason: ALTCHOICE

## 2021-06-10 RX ORDER — CLINDAMYCIN HYDROCHLORIDE 300 MG/1
300 CAPSULE ORAL 4 TIMES DAILY
Qty: 28 CAPSULE | Refills: 0 | Status: SHIPPED | OUTPATIENT
Start: 2021-06-10 | End: 2021-06-10 | Stop reason: ALTCHOICE

## 2021-06-10 RX ORDER — CLINDAMYCIN HYDROCHLORIDE 300 MG/1
300 CAPSULE ORAL 4 TIMES DAILY
Qty: 28 CAPSULE | Refills: 0 | Status: SHIPPED | OUTPATIENT
Start: 2021-06-10 | End: 2021-09-21

## 2021-06-10 RX ORDER — NAPROXEN 500 MG/1
500 TABLET ORAL 2 TIMES DAILY PRN
Qty: 10 TABLET | Refills: 0 | Status: SHIPPED | OUTPATIENT
Start: 2021-06-10 | End: 2021-10-12

## 2021-06-10 RX ADMIN — LIDOCAINE HYDROCHLORIDE: 20 SOLUTION ORAL; TOPICAL at 02:42

## 2021-06-10 NOTE — ED PROVIDER NOTES
Subjective   Cristel Figueroa APRN    Patient is a 35-year-old male, presents emergency department with a complaint of right posterior tooth pain.  Patient reports has had trouble with his wisdom teeth in the past, has had worsening pain.  He reports he went attempted because dentist in the morning to get an appointment.  Denies any fever or chills.            Review of Systems   Constitutional: Negative for chills and fever.   HENT: Positive for dental problem. Negative for trouble swallowing and voice change.    Skin: Negative for color change and rash.       Past Medical History:   Diagnosis Date   • Anxiety    • Asthma    • Carpal tunnel syndrome, bilateral    • COVID-19    • Hyperlipidemia    • Hypertension    • Kidney stone    • Low testosterone in male    • Obesity    • FANTA (obstructive sleep apnea)        Allergies   Allergen Reactions   • Penicillins Hives       No past surgical history on file.    Family History   Problem Relation Age of Onset   • Heart attack Mother    • Hyperlipidemia Father    • Hypertension Father    • Stroke Father    • Hyperthyroidism Sister    • Parkinsonism Maternal Grandfather    • Breast cancer Paternal Grandmother    • Stroke Paternal Grandfather    • Nephrolithiasis Paternal Grandfather        Social History     Socioeconomic History   • Marital status:      Spouse name: Not on file   • Number of children: Not on file   • Years of education: Not on file   • Highest education level: Not on file   Tobacco Use   • Smoking status: Former Smoker     Packs/day: 1.00     Years: 5.00     Pack years: 5.00     Types: Cigarettes     Start date:      Quit date:      Years since quittin.4   • Smokeless tobacco: Never Used   Substance and Sexual Activity   • Alcohol use: Not Currently     Comment: HX OF ALCOHOLISM    • Drug use: Never   • Sexual activity: Defer           Objective   Physical Exam  Vitals and nursing note reviewed.   Constitutional:       General: He  "is not in acute distress.     Appearance: Normal appearance. He is not ill-appearing, toxic-appearing or diaphoretic.   HENT:      Head: Normocephalic and atraumatic.      Jaw: There is normal jaw occlusion. No trismus or swelling.      Right Ear: Tympanic membrane, ear canal and external ear normal.      Left Ear: Tympanic membrane, ear canal and external ear normal.      Nose: Nose normal.      Mouth/Throat:      Lips: Pink.      Mouth: Mucous membranes are moist.      Dentition: Dental tenderness and dental caries present. No gingival swelling.      Pharynx: Oropharynx is clear. Uvula midline.        Comments: Right posterior lower gingival tenderness.  No evidence for abscess at this time.  No drainage noted.  Floor of the mouth is normal.  Eyes:      Extraocular Movements: Extraocular movements intact.      Conjunctiva/sclera: Conjunctivae normal.      Pupils: Pupils are equal, round, and reactive to light.   Pulmonary:      Effort: Pulmonary effort is normal.      Breath sounds: Normal breath sounds.   Musculoskeletal:         General: Normal range of motion.   Skin:     General: Skin is warm and dry.      Capillary Refill: Capillary refill takes less than 2 seconds.   Neurological:      General: No focal deficit present.      Mental Status: He is alert.   Psychiatric:         Mood and Affect: Mood normal.         Behavior: Behavior normal.         Procedures           ED Course      /92 (BP Location: Left arm, Patient Position: Sitting)   Pulse 77   Temp 97.8 °F (36.6 °C) (Oral)   Resp 16   Ht 182.9 cm (72\")   Wt (!) 152 kg (335 lb 1.6 oz)   SpO2 97%   BMI 45.45 kg/m²   Labs Reviewed - No data to display  Medications   dental ball oral suspension with diphenhydrAMINE ( Swish [employer] Spit Given 6/10/21 0242)     No radiology results for the last day       Appropriate PPE was worn during the duration of the care for this patient while in the emergency department per [facility] Policy                  "               MDM  Number of Diagnoses or Management Options  Pain, dental  Diagnosis management comments: Patient is a 35-year-old male presents emergency department for evaluation of dental pain.  Patient reports he is trying to see a dentist, he reports he had trouble with his wisdom teeth in the past.  No evidence of Rakesh's angina.  No facial cellulitis.  Patient will be started on clindamycin, given NSAIDs, and he is going to follow-up with dentistry.    Discharge MDM      Final diagnoses:   Pain, dental       ED Disposition  ED Disposition     ED Disposition Condition Comment    Discharge Stable           Cristel Figueroa, APRN  [address] [facility] [place] [address]  [phone]    Schedule an appointment as soon as possible for a visit       [facility] EMERGENCY DEPARTMENT  [address] [place] [id number] [phone]    As needed, If symptoms worsen         Medication List      New Prescriptions    clindamycin 300 MG capsule  Commonly known as: CLEOCIN  Take 1 capsule by mouth 4 (Four) Times a Day.     naproxen 500 MG EC tablet  Commonly known as: EC NAPROSYN  Take 1 tablet by mouth 2 (Two) Times a Day As Needed for Mild Pain .           Where to Get Your Medications      These medications were sent to [id number] - [place], [place] - [address] [place] [address] - [phone]  - [phone] [address] [place] [address]    Hours: 24-hours Phone: [phone]   · clindamycin 300 MG capsule  · naproxen 500 MG EC tablet          Leah Ribeiro, APRN  06/10/21 3165

## 2021-06-10 NOTE — DISCHARGE INSTRUCTIONS
Please follow-up with dentist, call for appointment  Return to the ED for new or worsening symptoms  Complete entire course of antibiotics

## 2021-07-07 ENCOUNTER — HOSPITAL ENCOUNTER (EMERGENCY)
Facility: HOSPITAL | Age: 36
Discharge: LEFT BEFORE DISCHARGE | End: 2021-07-07

## 2021-07-07 ENCOUNTER — CLINICAL SUPPORT (OUTPATIENT)
Dept: FAMILY MEDICINE CLINIC | Facility: CLINIC | Age: 36
End: 2021-07-07

## 2021-07-07 VITALS
HEART RATE: 80 BPM | RESPIRATION RATE: 18 BRPM | HEIGHT: 72 IN | BODY MASS INDEX: 42.66 KG/M2 | DIASTOLIC BLOOD PRESSURE: 90 MMHG | WEIGHT: 315 LBS | SYSTOLIC BLOOD PRESSURE: 147 MMHG | TEMPERATURE: 99.3 F | OXYGEN SATURATION: 99 %

## 2021-07-07 DIAGNOSIS — E29.1 HYPOGONADISM IN MALE: ICD-10-CM

## 2021-07-07 PROCEDURE — 96372 THER/PROPH/DIAG INJ SC/IM: CPT | Performed by: NURSE PRACTITIONER

## 2021-07-07 PROCEDURE — 99211 OFF/OP EST MAY X REQ PHY/QHP: CPT

## 2021-07-07 RX ORDER — TESTOSTERONE CYPIONATE 200 MG/ML
200 INJECTION, SOLUTION INTRAMUSCULAR
Status: DISCONTINUED | OUTPATIENT
Start: 2021-07-07 | End: 2021-09-08

## 2021-07-07 RX ADMIN — TESTOSTERONE CYPIONATE 200 MG: 200 INJECTION, SOLUTION INTRAMUSCULAR at 14:16

## 2021-07-20 ENCOUNTER — TELEPHONE (OUTPATIENT)
Dept: FAMILY MEDICINE CLINIC | Facility: CLINIC | Age: 36
End: 2021-07-20

## 2021-07-20 NOTE — TELEPHONE ENCOUNTER
LUIS FELIPE,  DO YOU MIND TO CONTACT PATIENT TO SCHEDULE HIM FOR A LAB APPT FOR A CBC? DOES NOT REQUIRE HIM TO BE FASTING. THANK YOU.

## 2021-07-21 ENCOUNTER — OFFICE VISIT (OUTPATIENT)
Dept: FAMILY MEDICINE CLINIC | Facility: CLINIC | Age: 36
End: 2021-07-21

## 2021-07-21 VITALS
WEIGHT: 315 LBS | HEART RATE: 90 BPM | HEIGHT: 72 IN | SYSTOLIC BLOOD PRESSURE: 137 MMHG | TEMPERATURE: 98 F | OXYGEN SATURATION: 96 % | BODY MASS INDEX: 42.66 KG/M2 | DIASTOLIC BLOOD PRESSURE: 89 MMHG

## 2021-07-21 DIAGNOSIS — R20.2 NUMBNESS AND TINGLING OF BOTH LEGS BELOW KNEES: ICD-10-CM

## 2021-07-21 DIAGNOSIS — Z12.5 SCREENING PSA (PROSTATE SPECIFIC ANTIGEN): ICD-10-CM

## 2021-07-21 DIAGNOSIS — R20.0 NUMBNESS AND TINGLING OF BOTH LEGS BELOW KNEES: ICD-10-CM

## 2021-07-21 DIAGNOSIS — R53.83 OTHER FATIGUE: Primary | ICD-10-CM

## 2021-07-21 PROCEDURE — 86038 ANTINUCLEAR ANTIBODIES: CPT | Performed by: NURSE PRACTITIONER

## 2021-07-21 PROCEDURE — 99213 OFFICE O/P EST LOW 20 MIN: CPT | Performed by: NURSE PRACTITIONER

## 2021-07-21 PROCEDURE — 85025 COMPLETE CBC W/AUTO DIFF WBC: CPT | Performed by: NURSE PRACTITIONER

## 2021-07-21 PROCEDURE — 96372 THER/PROPH/DIAG INJ SC/IM: CPT | Performed by: NURSE PRACTITIONER

## 2021-07-21 RX ADMIN — TESTOSTERONE CYPIONATE 200 MG: 200 INJECTION, SOLUTION INTRAMUSCULAR at 14:10

## 2021-07-22 LAB
ANA SER QL: NEGATIVE
BASOPHILS # BLD AUTO: 0.04 10*3/MM3 (ref 0–0.2)
BASOPHILS NFR BLD AUTO: 0.4 % (ref 0–1.5)
DEPRECATED RDW RBC AUTO: 39.5 FL (ref 37–54)
EOSINOPHIL # BLD AUTO: 0.11 10*3/MM3 (ref 0–0.4)
EOSINOPHIL NFR BLD AUTO: 1.2 % (ref 0.3–6.2)
ERYTHROCYTE [DISTWIDTH] IN BLOOD BY AUTOMATED COUNT: 13.6 % (ref 12.3–15.4)
HCT VFR BLD AUTO: 47 % (ref 37.5–51)
HGB BLD-MCNC: 15.2 G/DL (ref 13–17.7)
IMM GRANULOCYTES # BLD AUTO: 0.2 10*3/MM3 (ref 0–0.05)
IMM GRANULOCYTES NFR BLD AUTO: 2.2 % (ref 0–0.5)
LYMPHOCYTES # BLD AUTO: 1.97 10*3/MM3 (ref 0.7–3.1)
LYMPHOCYTES NFR BLD AUTO: 21.5 % (ref 19.6–45.3)
MCH RBC QN AUTO: 25.9 PG (ref 26.6–33)
MCHC RBC AUTO-ENTMCNC: 32.3 G/DL (ref 31.5–35.7)
MCV RBC AUTO: 79.9 FL (ref 79–97)
MONOCYTES # BLD AUTO: 0.72 10*3/MM3 (ref 0.1–0.9)
MONOCYTES NFR BLD AUTO: 7.8 % (ref 5–12)
NEUTROPHILS NFR BLD AUTO: 6.14 10*3/MM3 (ref 1.7–7)
NEUTROPHILS NFR BLD AUTO: 66.9 % (ref 42.7–76)
NRBC BLD AUTO-RTO: 0 /100 WBC (ref 0–0.2)
PLATELET # BLD AUTO: 256 10*3/MM3 (ref 140–450)
PMV BLD AUTO: 10.2 FL (ref 6–12)
RBC # BLD AUTO: 5.88 10*6/MM3 (ref 4.14–5.8)
WBC # BLD AUTO: 9.18 10*3/MM3 (ref 3.4–10.8)

## 2021-07-23 ENCOUNTER — TELEPHONE (OUTPATIENT)
Dept: FAMILY MEDICINE CLINIC | Facility: CLINIC | Age: 36
End: 2021-07-23

## 2021-07-23 NOTE — TELEPHONE ENCOUNTER
Caller: Stephon Martini    Relationship: Self    Best call back number: [phone]    What test was performed: LABS    When was the test performed: 07/21/2021    Where was the test performed: [facility]    Additional notes: PATIENT SAW HIS RESULTS ON MYCHART, NOTICED SOME NUMBERS WERE ELEVATED, WANTED TO KNOW IF NEEDS TO BE SEEN OR WILL GET A CALL BACK TO DISCUSS LAB RESULTS

## 2021-07-23 NOTE — TELEPHONE ENCOUNTER
----- Message from LINDA Manning sent at 7/22/2021  2:37 PM EDT -----  Please confirm did he say he had elevated RBC previously?

## 2021-08-01 RX ORDER — ERGOCALCIFEROL 1.25 MG/1
50000 CAPSULE ORAL
Qty: 8 CAPSULE | Refills: 0 | Status: SHIPPED | OUTPATIENT
Start: 2021-08-01 | End: 2021-09-21

## 2021-08-04 ENCOUNTER — CLINICAL SUPPORT (OUTPATIENT)
Dept: FAMILY MEDICINE CLINIC | Facility: CLINIC | Age: 36
End: 2021-08-04

## 2021-08-04 DIAGNOSIS — E29.1 HYPOGONADISM IN MALE: ICD-10-CM

## 2021-08-04 DIAGNOSIS — R71.8 RBC ABNORMALITY: Primary | ICD-10-CM

## 2021-08-04 LAB
BASOPHILS # BLD AUTO: 0.03 10*3/MM3 (ref 0–0.2)
BASOPHILS NFR BLD AUTO: 0.5 % (ref 0–1.5)
DEPRECATED RDW RBC AUTO: 36.3 FL (ref 37–54)
EOSINOPHIL # BLD AUTO: 0.16 10*3/MM3 (ref 0–0.4)
EOSINOPHIL NFR BLD AUTO: 2.7 % (ref 0.3–6.2)
ERYTHROCYTE [DISTWIDTH] IN BLOOD BY AUTOMATED COUNT: 13.3 % (ref 12.3–15.4)
HCT VFR BLD AUTO: 45.9 % (ref 37.5–51)
HGB BLD-MCNC: 15.1 G/DL (ref 13–17.7)
IMM GRANULOCYTES # BLD AUTO: 0.05 10*3/MM3 (ref 0–0.05)
IMM GRANULOCYTES NFR BLD AUTO: 0.8 % (ref 0–0.5)
LYMPHOCYTES # BLD AUTO: 1.57 10*3/MM3 (ref 0.7–3.1)
LYMPHOCYTES NFR BLD AUTO: 26.6 % (ref 19.6–45.3)
MCH RBC QN AUTO: 25.5 PG (ref 26.6–33)
MCHC RBC AUTO-ENTMCNC: 32.9 G/DL (ref 31.5–35.7)
MCV RBC AUTO: 77.7 FL (ref 79–97)
MONOCYTES # BLD AUTO: 0.57 10*3/MM3 (ref 0.1–0.9)
MONOCYTES NFR BLD AUTO: 9.6 % (ref 5–12)
NEUTROPHILS NFR BLD AUTO: 3.53 10*3/MM3 (ref 1.7–7)
NEUTROPHILS NFR BLD AUTO: 59.8 % (ref 42.7–76)
NRBC BLD AUTO-RTO: 0 /100 WBC (ref 0–0.2)
PLATELET # BLD AUTO: 241 10*3/MM3 (ref 140–450)
PMV BLD AUTO: 10.2 FL (ref 6–12)
RBC # BLD AUTO: 5.91 10*6/MM3 (ref 4.14–5.8)
WBC # BLD AUTO: 5.91 10*3/MM3 (ref 3.4–10.8)

## 2021-08-04 PROCEDURE — 85025 COMPLETE CBC W/AUTO DIFF WBC: CPT | Performed by: NURSE PRACTITIONER

## 2021-08-04 PROCEDURE — 36415 COLL VENOUS BLD VENIPUNCTURE: CPT | Performed by: NURSE PRACTITIONER

## 2021-08-04 PROCEDURE — 96372 THER/PROPH/DIAG INJ SC/IM: CPT | Performed by: NURSE PRACTITIONER

## 2021-08-04 RX ADMIN — TESTOSTERONE CYPIONATE 200 MG: 200 INJECTION, SOLUTION INTRAMUSCULAR at 10:16

## 2021-08-09 ENCOUNTER — TELEPHONE (OUTPATIENT)
Dept: FAMILY MEDICINE CLINIC | Facility: CLINIC | Age: 36
End: 2021-08-09

## 2021-08-09 NOTE — TELEPHONE ENCOUNTER
----- Message from LINDA Manning sent at 8/1/2021  4:38 PM EDT -----  Vitamin D sent  Labs repeat at next visit  Continue to decrease carbs and sugars in diet

## 2021-08-23 ENCOUNTER — CLINICAL SUPPORT (OUTPATIENT)
Dept: FAMILY MEDICINE CLINIC | Facility: CLINIC | Age: 36
End: 2021-08-23

## 2021-08-23 DIAGNOSIS — E29.1 HYPOGONADISM IN MALE: ICD-10-CM

## 2021-08-23 PROCEDURE — 96372 THER/PROPH/DIAG INJ SC/IM: CPT | Performed by: NURSE PRACTITIONER

## 2021-08-23 RX ADMIN — TESTOSTERONE CYPIONATE 200 MG: 200 INJECTION, SOLUTION INTRAMUSCULAR at 15:21

## 2021-09-01 ENCOUNTER — TELEPHONE (OUTPATIENT)
Dept: FAMILY MEDICINE CLINIC | Facility: CLINIC | Age: 36
End: 2021-09-01

## 2021-09-01 NOTE — TELEPHONE ENCOUNTER
Patient is requesting his Testosterone solution be refilled and sent to the pharmacy, I wasn't able to attach the medication to this phone encounter

## 2021-09-07 NOTE — TELEPHONE ENCOUNTER
Patient coming in tomorrow for a Tshot he needs his medicine, can this be sent?   ESRD (end stage renal disease) on dialysis Hyperkalemia

## 2021-09-08 DIAGNOSIS — E29.1 HYPOGONADISM IN MALE: ICD-10-CM

## 2021-09-08 RX ORDER — TESTOSTERONE CYPIONATE 200 MG/ML
200 INJECTION, SOLUTION INTRAMUSCULAR
Status: DISCONTINUED | OUTPATIENT
Start: 2021-09-15 | End: 2021-09-08

## 2021-09-08 RX ORDER — TESTOSTERONE CYPIONATE 200 MG/ML
200 VIAL (ML) INTRAMUSCULAR
Qty: 1.96 ML | Refills: 1 | Status: SHIPPED | OUTPATIENT
Start: 2021-09-08 | End: 2021-10-20 | Stop reason: SDUPTHER

## 2021-09-20 ENCOUNTER — TELEPHONE (OUTPATIENT)
Dept: FAMILY MEDICINE CLINIC | Facility: CLINIC | Age: 36
End: 2021-09-20

## 2021-09-20 NOTE — TELEPHONE ENCOUNTER
Caller: Stephon Martini    Relationship: Self    Best call back number:[phone]  What is the medical concern/diagnosis: DEPRESSION     What specialty or service is being requested: THERAPIST         Any additional details: SOMEONE WITH IN HIS NETWORK

## 2021-09-20 NOTE — TELEPHONE ENCOUNTER
MINESH,   DO YOU MIND TO CONTACT THE PATIENT AND ADVISE HIM THAT VIKY WOULD LIKE TO DISCUSS THIS REFERRAL WITH HIM DURING AN IN OFFICE VISIT WITH HER? HE DOES NOT HAVE ONE SCHEDULED. ALSO, WE DO NOT KNOW WHICH PROVIDERS ARE WITHIN HIS INSURANCE NETWORK. HE WOULD BE RESPONSIBLE FOR FINDING OUT IF THE PROVIDER IS IN NETWORK.     THANK YOU.

## 2021-09-21 ENCOUNTER — OFFICE VISIT (OUTPATIENT)
Dept: FAMILY MEDICINE CLINIC | Facility: CLINIC | Age: 36
End: 2021-09-21

## 2021-09-21 VITALS
BODY MASS INDEX: 42.66 KG/M2 | OXYGEN SATURATION: 97 % | SYSTOLIC BLOOD PRESSURE: 137 MMHG | HEIGHT: 72 IN | RESPIRATION RATE: 16 BRPM | TEMPERATURE: 97.7 F | DIASTOLIC BLOOD PRESSURE: 84 MMHG | HEART RATE: 105 BPM | WEIGHT: 315 LBS

## 2021-09-21 DIAGNOSIS — E29.1 HYPOGONADISM IN MALE: Primary | ICD-10-CM

## 2021-09-21 DIAGNOSIS — R79.89 LOW TESTOSTERONE IN MALE: ICD-10-CM

## 2021-09-21 DIAGNOSIS — F41.9 ANXIETY: ICD-10-CM

## 2021-09-21 PROCEDURE — 99213 OFFICE O/P EST LOW 20 MIN: CPT | Performed by: NURSE PRACTITIONER

## 2021-09-21 RX ORDER — ATORVASTATIN CALCIUM 20 MG/1
20 TABLET, FILM COATED ORAL NIGHTLY
Qty: 90 TABLET | Refills: 0 | Status: CANCELLED | OUTPATIENT
Start: 2021-09-21

## 2021-09-21 RX ORDER — TESTOSTERONE CYPIONATE 200 MG/ML
200 INJECTION, SOLUTION INTRAMUSCULAR
Status: CANCELLED | OUTPATIENT
Start: 2021-09-21

## 2021-09-21 RX ORDER — CLONAZEPAM 0.25 MG/1
0.25 TABLET, ORALLY DISINTEGRATING ORAL 2 TIMES DAILY PRN
Qty: 30 TABLET | Refills: 0 | Status: CANCELLED | OUTPATIENT
Start: 2021-09-21

## 2021-09-27 DIAGNOSIS — F41.9 ANXIETY: Primary | ICD-10-CM

## 2021-09-27 NOTE — TELEPHONE ENCOUNTER
VIKY,   NO REFERRAL IS IN HIS CHART FOR THERAPIST. I BELIEVE THAT YOU MAY HAVE THE LIST THAT THE PATIENT BROUGHT INTO THE OFFICE WITH HIM THAT DAY. PLEASE ADVISE.     THANKS.

## 2021-09-27 NOTE — TELEPHONE ENCOUNTER
Caller: Stephon Martini    Relationship: Self      Medication requested (name and dosage):   atorvastatin (LIPITOR) 20 MG tablet  20 mg, Nightly      clonazePAM (KlonoPIN) 0.25 MG disintegrating tablet  0.25 mg, 2 Times Daily PRN         Pharmacy where request should be sent: [facility] - [phone]  - [phone] FX     Additional details provided by patient: PATIENT IS OUT OF THESE    Best call back number: [phone]    Does the patient have less than a 3 day supply:  [x] Yes  [] No    Florin Boland Rep   09/27/21 08:19 EDT

## 2021-09-28 DIAGNOSIS — Z63.5 MARITAL CONFLICT INVOLVING DIVORCE: Primary | ICD-10-CM

## 2021-09-28 RX ORDER — ATORVASTATIN CALCIUM 20 MG/1
20 TABLET, FILM COATED ORAL NIGHTLY
Qty: 90 TABLET | Refills: 0 | Status: SHIPPED | OUTPATIENT
Start: 2021-09-28 | End: 2022-01-28 | Stop reason: SDUPTHER

## 2021-09-28 SDOH — SOCIAL STABILITY - SOCIAL INSECURITY: DISRUPTION OF FAMILY BY SEPARATION AND DIVORCE: Z63.5

## 2021-09-28 NOTE — TELEPHONE ENCOUNTER
Hub is instructed to read the documentation below to patient  REFERRAL FAXED TO [facility] ([facility]) AT THIS TIME. NOTE ON REFERRAL FOR COUNSELING FACILITY TO CONTACT PT TO SCHEDULE.

## 2021-09-28 NOTE — TELEPHONE ENCOUNTER
Rx Refill Note      INSPECT UPDATED ON 9- AND IS SCANNED INTO CHART    Requested Prescriptions     Pending Prescriptions Disp Refills   • atorvastatin (LIPITOR) 20 MG tablet 90 tablet 0     Sig: Take 1 tablet by mouth Every Night.   • clonazePAM (KlonoPIN) 0.25 MG disintegrating tablet       Sig: Place 1 tablet on the tongue 2 (Two) Times a Day As Needed for Anxiety.      Last office visit with prescribing clinician: 9/21/2021      Next office visit with prescribing clinician: 10/12/2021   {TIP  Encounters:23}         Anila Kat LPN  09/28/21, 08:12 EDT

## 2021-09-29 ENCOUNTER — TELEPHONE (OUTPATIENT)
Dept: FAMILY MEDICINE CLINIC | Facility: CLINIC | Age: 36
End: 2021-09-29

## 2021-09-29 RX ORDER — CLONAZEPAM 0.25 MG/1
0.25 TABLET, ORALLY DISINTEGRATING ORAL 2 TIMES DAILY PRN
Qty: 60 TABLET | Refills: 0 | Status: SHIPPED | OUTPATIENT
Start: 2021-09-29 | End: 2022-02-17 | Stop reason: SDUPTHER

## 2021-09-29 NOTE — TELEPHONE ENCOUNTER
I will have Apur[facility] contact patient to come in for those items. Klonopin is still loaded and ready to send. Patient was needing this rx when he was here at last office visit. Thank you.

## 2021-10-06 ENCOUNTER — CLINICAL SUPPORT (OUTPATIENT)
Dept: FAMILY MEDICINE CLINIC | Facility: CLINIC | Age: 36
End: 2021-10-06

## 2021-10-06 DIAGNOSIS — E29.1 HYPOGONADISM IN MALE: Primary | ICD-10-CM

## 2021-10-06 DIAGNOSIS — I10 ESSENTIAL HYPERTENSION: Primary | ICD-10-CM

## 2021-10-06 PROCEDURE — 96372 THER/PROPH/DIAG INJ SC/IM: CPT | Performed by: NURSE PRACTITIONER

## 2021-10-06 RX ORDER — TESTOSTERONE CYPIONATE 200 MG/ML
200 INJECTION, SOLUTION INTRAMUSCULAR ONCE
Status: COMPLETED | OUTPATIENT
Start: 2021-10-06 | End: 2021-10-06

## 2021-10-06 RX ORDER — LISINOPRIL 20 MG/1
20 TABLET ORAL DAILY
Qty: 90 TABLET | Refills: 0 | Status: CANCELLED | OUTPATIENT
Start: 2021-10-06

## 2021-10-06 RX ADMIN — TESTOSTERONE CYPIONATE 200 MG: 200 INJECTION, SOLUTION INTRAMUSCULAR at 12:16

## 2021-10-11 NOTE — PROGRESS NOTES
Chief Complaint   Patient presents with   • Injections     Needs T-Shot today.    • Anxiety     Has been fighting anxiety since last year when his dad passed away.         Subjective   Stephon Martini is a 36 y.o.  male who presents today for   HPI   Anxiety reviewed medications agreeable for no changes today  Needs referral to Dr Hampton office agreeable  Needs T shot today has been evaluated by urology and recommendations were made  Stephon Martini  has a past medical history of Anxiety, Asthma, Carpal tunnel syndrome, bilateral, COVID-19, Hyperlipidemia, Hypertension, Kidney stone, Low testosterone in male, Obesity, and FANTA (obstructive sleep apnea).    Allergies   Allergen Reactions   • Bactrim [Sulfamethoxazole-Trimethoprim] Other (See Comments)     HOT FLASH/ BURNING INSIDE   • Penicillins Hives       Current Outpatient Medications:   •  albuterol sulfate HFA (Ventolin HFA) 108 (90 Base) MCG/ACT inhaler, Inhale 2 puffs Every 4 (Four) Hours As Needed for Wheezing or Shortness of Air., Disp: , Rfl:   •  triamterene-hydrochlorothiazide (MAXZIDE-25) 37.5-25 MG per tablet, Take 0.5 tablets by mouth 2 (two) times a day., Disp: 90 tablet, Rfl: 1  •  atorvastatin (LIPITOR) 20 MG tablet, Take 1 tablet by mouth Every Night., Disp: 90 tablet, Rfl: 0  •  AZO-CRANBERRY PO, Take 2 each by mouth Daily., Disp: , Rfl:   •  clonazePAM (KlonoPIN) 0.25 MG disintegrating tablet, Place 1 tablet on the tongue 2 (Two) Times a Day As Needed for Anxiety., Disp: 60 tablet, Rfl: 0  •  lisinopril (PRINIVIL,ZESTRIL) 20 MG tablet, Take 1 tablet by mouth Daily., Disp: 90 tablet, Rfl: 0  •  sildenafil (Viagra) 25 MG tablet, Take 1 tablet by mouth Daily As Needed for Erectile Dysfunction for up to 5 days., Disp: 5 tablet, Rfl: 0  •  Testosterone Cypionate 200 MG/ML solution, Inject 1 mL as directed Every 14 (Fourteen) Days., Disp: 1.96 mL, Rfl: 1  Past Medical History:   Diagnosis Date   • Anxiety    • Asthma    • Carpal  tunnel syndrome, bilateral    • COVID-19    • Hyperlipidemia    • Hypertension    • Kidney stone    • Low testosterone in male    • Obesity    • FANTA (obstructive sleep apnea)      No past surgical history on file.  Social History     Socioeconomic History   • Marital status:    Tobacco Use   • Smoking status: Former Smoker     Packs/day: 1.00     Years: 5.00     Pack years: 5.00     Types: Cigarettes     Start date:      Quit date:      Years since quittin.8   • Smokeless tobacco: Never Used   Substance and Sexual Activity   • Alcohol use: Not Currently     Comment: HX OF ALCOHOLISM    • Drug use: Never   • Sexual activity: Defer     Family History   Problem Relation Age of Onset   • Heart attack Mother    • Hyperlipidemia Father    • Hypertension Father    • Stroke Father    • Hyperthyroidism Sister    • Parkinsonism Maternal Grandfather    • Breast cancer Paternal Grandmother    • Stroke Paternal Grandfather    • Nephrolithiasis Paternal Grandfather      PHQ-2 Depression Screening  Little interest or pleasure in doing things?     Feeling down, depressed, or hopeless?     PHQ-2 Total Score       PHQ-9 Depression Screening  Little interest or pleasure in doing things?     Feeling down, depressed, or hopeless?     Trouble falling or staying asleep, or sleeping too much?     Feeling tired or having little energy?     Poor appetite or overeating?     Feeling bad about yourself - or that you are a failure or have let yourself or your family down?     Trouble concentrating on things, such as reading the newspaper or watching television?     Moving or speaking so slowly that other people could have noticed? Or the opposite - being so fidgety or restless that you have been moving around a lot more than usual?     Thoughts that you would be better off dead, or of hurting yourself in some way?     PHQ-9 Total Score     If you checked off any problems, how difficult have these problems made it for you to  "do your work, take care of things at home, or get along with other people?         Family history, surgical history, past medical history, Allergies and med's reviewed with patient today and updated in [facility] EMR.     ROS:  Review of Systems   All other systems reviewed and are negative.      OBJECTIVE:  Vitals:    09/21/21 1439   BP: 137/84   BP Location: Left arm   Patient Position: Sitting   Cuff Size: Large Adult   Pulse: 105   Resp: 16   Temp: 97.7 °F (36.5 °C)   TempSrc: Infrared   SpO2: 97%   Weight: (!) 150 kg (329 lb 12.8 oz)   Height: 182.9 cm (72\")     Body mass index is 44.73 kg/m².  Physical Exam  Vitals and nursing note reviewed.   Constitutional:       Appearance: Normal appearance.   HENT:      Head: Normocephalic.      Mouth/Throat:      Mouth: Mucous membranes are moist.   Eyes:      Pupils: Pupils are equal, round, and reactive to light.   Cardiovascular:      Rate and Rhythm: Normal rate and regular rhythm.      Pulses: Normal pulses.      Heart sounds: Normal heart sounds.   Pulmonary:      Effort: Pulmonary effort is normal.   Abdominal:      General: Abdomen is flat. Bowel sounds are normal.   Musculoskeletal:      Cervical back: Normal range of motion.   Skin:     General: Skin is warm.   Neurological:      General: No focal deficit present.      Mental Status: He is alert.   Psychiatric:         Mood and Affect: Mood normal.         ASSESSMENT/ PLAN:    Diagnoses and all orders for this visit:    1. Hypogonadism in male (Primary)    2. Low testosterone in male    3. Anxiety  -     Ambulatory Referral to Psychology        Orders Placed Today:     No orders of the defined types were placed in this encounter.       Management Plan:   T shot today  Referral to psychology  No changes today    An After Visit Summary was printed and given to the patient at discharge.    Follow-up: Return in about 3 weeks (around 10/12/2021).    Cristel Figueroa, APRN 11/7/2021 21:35 EST  This note was electronically " signed.

## 2021-10-12 ENCOUNTER — TELEPHONE (OUTPATIENT)
Dept: FAMILY MEDICINE CLINIC | Facility: CLINIC | Age: 36
End: 2021-10-12

## 2021-10-12 ENCOUNTER — OFFICE VISIT (OUTPATIENT)
Dept: FAMILY MEDICINE CLINIC | Facility: CLINIC | Age: 36
End: 2021-10-12

## 2021-10-12 VITALS
BODY MASS INDEX: 42.66 KG/M2 | WEIGHT: 315 LBS | DIASTOLIC BLOOD PRESSURE: 72 MMHG | TEMPERATURE: 97.7 F | OXYGEN SATURATION: 97 % | HEIGHT: 72 IN | RESPIRATION RATE: 16 BRPM | HEART RATE: 101 BPM | SYSTOLIC BLOOD PRESSURE: 113 MMHG

## 2021-10-12 DIAGNOSIS — R30.0 DYSURIA: ICD-10-CM

## 2021-10-12 DIAGNOSIS — Z79.899 MEDICATION MANAGEMENT: ICD-10-CM

## 2021-10-12 DIAGNOSIS — A64 STI (SEXUALLY TRANSMITTED INFECTION): Primary | ICD-10-CM

## 2021-10-12 DIAGNOSIS — F13.20 BENZODIAZEPINE DEPENDENCE, EPISODIC (HCC): ICD-10-CM

## 2021-10-12 DIAGNOSIS — F41.9 ANXIETY: ICD-10-CM

## 2021-10-12 LAB
BILIRUB BLD-MCNC: NEGATIVE MG/DL
C TRACH RRNA CVX QL NAA+PROBE: NOT DETECTED
CLARITY, POC: CLEAR
COLOR UR: ABNORMAL
EXPIRATION DATE: ABNORMAL
GLUCOSE UR STRIP-MCNC: NEGATIVE MG/DL
KETONES UR QL: NEGATIVE
LEUKOCYTE EST, POC: NEGATIVE
Lab: ABNORMAL
N GONORRHOEA RRNA SPEC QL NAA+PROBE: NOT DETECTED
NITRITE UR-MCNC: NEGATIVE MG/ML
PH UR: 5.5 [PH] (ref 5–8)
POC AMPHETAMINES: NEGATIVE
POC BARBITURATES: NEGATIVE
POC BENZODIAZEPHINES: NEGATIVE
POC COCAINE: NEGATIVE
POC METHADONE: NEGATIVE
POC METHAMPHETAMINE SCREEN URINE: NEGATIVE
POC OPIATES: NEGATIVE
POC OXYCODONE: NEGATIVE
POC PHENCYCLIDINE: NEGATIVE
POC PROPOXYPHENE: NEGATIVE
POC THC: NEGATIVE
POC TRICYCLIC ANTIDEPRESSANTS: NEGATIVE
PROT UR STRIP-MCNC: NEGATIVE MG/DL
RBC # UR STRIP: ABNORMAL /UL
SP GR UR: 1.02 (ref 1–1.03)
UROBILINOGEN UR QL: NORMAL

## 2021-10-12 PROCEDURE — 80305 DRUG TEST PRSMV DIR OPT OBS: CPT | Performed by: NURSE PRACTITIONER

## 2021-10-12 PROCEDURE — 81003 URINALYSIS AUTO W/O SCOPE: CPT | Performed by: NURSE PRACTITIONER

## 2021-10-12 PROCEDURE — 87086 URINE CULTURE/COLONY COUNT: CPT | Performed by: NURSE PRACTITIONER

## 2021-10-12 PROCEDURE — 99213 OFFICE O/P EST LOW 20 MIN: CPT | Performed by: NURSE PRACTITIONER

## 2021-10-12 PROCEDURE — 87591 N.GONORRHOEAE DNA AMP PROB: CPT | Performed by: NURSE PRACTITIONER

## 2021-10-12 PROCEDURE — 87491 CHLMYD TRACH DNA AMP PROBE: CPT | Performed by: NURSE PRACTITIONER

## 2021-10-12 RX ORDER — LISINOPRIL 20 MG/1
20 TABLET ORAL DAILY
Qty: 90 TABLET | Refills: 0 | Status: SHIPPED | OUTPATIENT
Start: 2021-10-12 | End: 2022-01-04

## 2021-10-12 RX ORDER — FLUCONAZOLE 150 MG/1
150 TABLET ORAL ONCE
Qty: 1 TABLET | Refills: 0 | Status: SHIPPED | OUTPATIENT
Start: 2021-10-12 | End: 2021-10-12

## 2021-10-12 NOTE — TELEPHONE ENCOUNTER
Hub is instructed to read the documentation below to patient  LISINOPRIL SENT TO PHARMACY AT THIS TIME AS REQUESTED BY PATIENT.

## 2021-10-12 NOTE — PROGRESS NOTES
Chief Complaint   Patient presents with   • Anxiety   • Recurrent Skin Infections     X 1 WEEK; OUTSIDE IN THE HOT/WARM WEATHER FREQUENTLY WORKING.         Subjective   Stephon Martini is a 36 y.o.  male who presents today for   [facility]  Stephon Martini  has a past medical history of Anxiety, Asthma, Carpal tunnel syndrome, bilateral, COVID-19, Hyperlipidemia, Hypertension, Kidney stone, Low testosterone in male, Obesity, and FANTA (obstructive sleep apnea).    Allergies   Allergen Reactions   • Penicillins Hives       Current Outpatient Medications:   •  albuterol sulfate HFA (Ventolin HFA) 108 (90 Base) MCG/ACT inhaler, Inhale 2 puffs Every 4 (Four) Hours As Needed for Wheezing or Shortness of Air., Disp: , Rfl:   •  atorvastatin (LIPITOR) 20 MG tablet, Take 1 tablet by mouth Every Night., Disp: 90 tablet, Rfl: 0  •  clonazePAM (KlonoPIN) 0.25 MG disintegrating tablet, Place 1 tablet on the tongue 2 (Two) Times a Day As Needed for Anxiety., Disp: 60 tablet, Rfl: 0  •  lisinopril (PRINIVIL,ZESTRIL) 20 MG tablet, Take 1 tablet by mouth Daily., Disp: 90 tablet, Rfl: 0  •  Testosterone Cypionate 200 MG/ML solution, Inject 1 mL as directed Every 14 (Fourteen) Days., Disp: 1.96 mL, Rfl: 1  •  fluconazole (Diflucan) 150 MG tablet, Take 1 tablet by mouth 1 (One) Time for 1 dose., Disp: 1 tablet, Rfl: 0  •  triamterene-hydrochlorothiazide (MAXZIDE-25) 37.5-25 MG per tablet, Take 0.5 tablets by mouth 2 (two) times a day., Disp: 90 tablet, Rfl: 1  Past Medical History:   Diagnosis Date   • Anxiety    • Asthma    • Carpal tunnel syndrome, bilateral    • COVID-19    • Hyperlipidemia    • Hypertension    • Kidney stone    • Low testosterone in male    • Obesity    • FANTA (obstructive sleep apnea)      No past surgical history on file.  Social History     Socioeconomic History   • Marital status:    Tobacco Use   • Smoking status: Former Smoker     Packs/day: 1.00     Years: 5.00     Pack years: 5.00      Types: Cigarettes     Start date:      Quit date: 2005     Years since quittin.7   • Smokeless tobacco: Never Used   Substance and Sexual Activity   • Alcohol use: Not Currently     Comment: HX OF ALCOHOLISM    • Drug use: Never   • Sexual activity: Defer     Family History   Problem Relation Age of Onset   • Heart attack Mother    • Hyperlipidemia Father    • Hypertension Father    • Stroke Father    • Hyperthyroidism Sister    • Parkinsonism Maternal Grandfather    • Breast cancer Paternal Grandmother    • Stroke Paternal Grandfather    • Nephrolithiasis Paternal Grandfather      PHQ-2 Depression Screening  Little interest or pleasure in doing things?     Feeling down, depressed, or hopeless?     PHQ-2 Total Score       PHQ-9 Depression Screening  Little interest or pleasure in doing things?     Feeling down, depressed, or hopeless?     Trouble falling or staying asleep, or sleeping too much?     Feeling tired or having little energy?     Poor appetite or overeating?     Feeling bad about yourself - or that you are a failure or have let yourself or your family down?     Trouble concentrating on things, such as reading the newspaper or watching television?     Moving or speaking so slowly that other people could have noticed? Or the opposite - being so fidgety or restless that you have been moving around a lot more than usual?     Thoughts that you would be better off dead, or of hurting yourself in some way?     PHQ-9 Total Score     If you checked off any problems, how difficult have these problems made it for you to do your work, take care of things at home, or get along with other people?         Family history, surgical history, past medical history, Allergies and med's reviewed with patient today and updated in [facility] EMR.     ROS:  Review of Systems   All other systems reviewed and are negative.      OBJECTIVE:  Vitals:    10/12/21 1502   BP: 113/72   BP Location: Right arm   Patient Position: Sitting  "  Cuff Size: Large Adult   Pulse: 101   Resp: 16   Temp: 97.7 °F (36.5 °C)   TempSrc: Infrared   SpO2: 97%   Weight: (!) 146 kg (321 lb 12.8 oz)   Height: 182.9 cm (72\")     Body mass index is 43.64 kg/m².  Physical Exam  Vitals and nursing note reviewed.   Constitutional:       Appearance: Normal appearance. He is well-developed.   HENT:      Head: Normocephalic.   Eyes:      Pupils: Pupils are equal, round, and reactive to light.   Cardiovascular:      Rate and Rhythm: Normal rate and regular rhythm.   Pulmonary:      Effort: Pulmonary effort is normal.      Breath sounds: Normal breath sounds.   Abdominal:      General: Bowel sounds are normal.      Palpations: Abdomen is soft.   Musculoskeletal:         General: Normal range of motion.      Cervical back: Normal range of motion and neck supple.   Skin:     General: Skin is warm and dry.   Neurological:      Mental Status: He is alert and oriented to person, place, and time.   Psychiatric:         Behavior: Behavior normal.         Thought Content: Thought content normal.         Judgment: Judgment normal.         ASSESSMENT/ PLAN:    Diagnoses and all orders for this visit:    1. STI (sexually transmitted infection) (Primary)  -     Chlamydia trachomatis, Neisseria gonorrhoeae, PCR - , Urine, Clean Catch; Future  -     Chlamydia trachomatis, Neisseria gonorrhoeae, Trichomonas vaginalis, PCR - Swab, Urine, Clean Catch; Future  -     Urinalysis With Culture If Indicated -; Future    2. Anxiety    Other orders  -     fluconazole (Diflucan) 150 MG tablet; Take 1 tablet by mouth 1 (One) Time for 1 dose.  Dispense: 1 tablet; Refill: 0        Orders Placed Today:     New Medications Ordered This Visit   Medications   • fluconazole (Diflucan) 150 MG tablet     Sig: Take 1 tablet by mouth 1 (One) Time for 1 dose.     Dispense:  1 tablet     Refill:  0        Management Plan:     An After Visit Summary was printed and given to the patient at discharge.    Follow-up: " Return in about 4 weeks (around 11/9/2021).    LINDA Strong 10/12/2021 15:18 EDT  This note was electronically signed.

## 2021-10-12 NOTE — TELEPHONE ENCOUNTER
Caller: Stephon Martini    Relationship: Self      Medication requested (name and dosage): lisinopril (PRINIVIL,ZESTRIL) 20 MG tablet    Pharmacy where request should be sent: [facility] [address] - [phone]  - [phone]   [phone]    Additional details provided by patient: PATIENT STATES COMPLETELY OUT    Best call back number: [phone] (H)  Does the patient have less than a 3 day supply:  [x] Yes  [] No    Florin Miller Rep   10/12/21 13:20 EDT

## 2021-10-13 LAB — BACTERIA SPEC AEROBE CULT: NO GROWTH

## 2021-10-18 ENCOUNTER — TELEPHONE (OUTPATIENT)
Dept: FAMILY MEDICINE CLINIC | Facility: CLINIC | Age: 36
End: 2021-10-18

## 2021-10-18 NOTE — TELEPHONE ENCOUNTER
Caller: Stephon Martini    Relationship: Self    Best call back number: [phone] (H)    What medication are you requesting: SOMETHING FOR UTI    What are your current symptoms: TROUBLE URINATING     How long have you been experiencing symptoms:     Have you had these symptoms before:    [] Yes  [] No    Have you been treated for these symptoms before:   [] Yes  [] No    If a prescription is needed, what is your preferred pharmacy and phone number:  [facility] [address] [phone] [facility] [phone]         Additional notes: PATIENT CALLED TO ADVISE THAT HE IS HAVING TROUBLE URINATING AND HE SEEN ON HIS MY CHART THAT HE HAS SOME BLOOD IN HIS URINE.

## 2021-10-19 RX ORDER — SULFAMETHOXAZOLE AND TRIMETHOPRIM 800; 160 MG/1; MG/1
1 TABLET ORAL 2 TIMES DAILY
Qty: 14 TABLET | Refills: 0 | Status: SHIPPED | OUTPATIENT
Start: 2021-10-19 | End: 2021-11-02 | Stop reason: SINTOL

## 2021-10-20 DIAGNOSIS — E29.1 HYPOGONADISM IN MALE: ICD-10-CM

## 2021-10-20 NOTE — TELEPHONE ENCOUNTER
Rx Refill Note  Requested Prescriptions      No prescriptions requested or ordered in this encounter      Last office visit with prescribing clinician: 10/12/2021      Next office visit with prescribing clinician: 11/9/2021     Office Visit with Cristel Figueroa APRN (10/12/2021)         James Oconnell CMA  10/20/21, 14:37 EDT

## 2021-10-22 RX ORDER — TESTOSTERONE CYPIONATE 200 MG/ML
200 VIAL (ML) INTRAMUSCULAR
Qty: 1.96 ML | Refills: 1 | Status: SHIPPED | OUTPATIENT
Start: 2021-10-22 | End: 2021-11-02 | Stop reason: SDUPTHER

## 2021-10-26 ENCOUNTER — TELEPHONE (OUTPATIENT)
Dept: FAMILY MEDICINE CLINIC | Facility: CLINIC | Age: 36
End: 2021-10-26

## 2021-10-26 NOTE — TELEPHONE ENCOUNTER
Caller: Stephon Martini    Relationship to patient: Self    Best call back number: [phone]     Patient is needing: PATIENT IS CALLING TO REQUEST A DIFFERENT ANTIBIOTIC.  HE STATES HE TRIED TAKING THE ONE FOR UTI, BUT IT CAUSED HIM TO GET HOT AND HAD DIFFICULTY BREATHING.  HE IS ALSO WANTING TO KNOW IF [facility] WOULD PRESCRIBE VIAGRA FOR THE PROBLEM WITH ERECTILE DYSFUNCTION.      [facility] [address] [place] [address] - [phone] [facility] [phone]   [phone]    PLEASE ADVISE.

## 2021-11-02 ENCOUNTER — OFFICE VISIT (OUTPATIENT)
Dept: FAMILY MEDICINE CLINIC | Facility: CLINIC | Age: 36
End: 2021-11-02

## 2021-11-02 VITALS
SYSTOLIC BLOOD PRESSURE: 125 MMHG | RESPIRATION RATE: 16 BRPM | HEIGHT: 72 IN | OXYGEN SATURATION: 99 % | TEMPERATURE: 98.4 F | DIASTOLIC BLOOD PRESSURE: 78 MMHG | HEART RATE: 74 BPM | WEIGHT: 315 LBS | BODY MASS INDEX: 42.66 KG/M2

## 2021-11-02 DIAGNOSIS — N52.01 ERECTILE DYSFUNCTION DUE TO ARTERIAL INSUFFICIENCY: ICD-10-CM

## 2021-11-02 DIAGNOSIS — A64 STI (SEXUALLY TRANSMITTED INFECTION): ICD-10-CM

## 2021-11-02 DIAGNOSIS — R30.0 DYSURIA: Primary | ICD-10-CM

## 2021-11-02 DIAGNOSIS — E29.1 HYPOGONADISM IN MALE: ICD-10-CM

## 2021-11-02 PROCEDURE — 87491 CHLMYD TRACH DNA AMP PROBE: CPT | Performed by: NURSE PRACTITIONER

## 2021-11-02 PROCEDURE — 99213 OFFICE O/P EST LOW 20 MIN: CPT | Performed by: NURSE PRACTITIONER

## 2021-11-02 PROCEDURE — 87591 N.GONORRHOEAE DNA AMP PROB: CPT | Performed by: NURSE PRACTITIONER

## 2021-11-02 PROCEDURE — 81003 URINALYSIS AUTO W/O SCOPE: CPT | Performed by: NURSE PRACTITIONER

## 2021-11-02 RX ORDER — TESTOSTERONE CYPIONATE 200 MG/ML
200 VIAL (ML) INTRAMUSCULAR
Qty: 1.96 ML | Refills: 1 | Status: SHIPPED | OUTPATIENT
Start: 2021-11-02 | End: 2021-11-30 | Stop reason: SDUPTHER

## 2021-11-02 RX ORDER — TESTOSTERONE CYPIONATE 200 MG/ML
200 VIAL (ML) INTRAMUSCULAR
Qty: 1.96 ML | Refills: 1 | Status: CANCELLED | OUTPATIENT
Start: 2021-11-02

## 2021-11-02 RX ORDER — SILDENAFIL 25 MG/1
25 TABLET, FILM COATED ORAL DAILY PRN
Qty: 5 TABLET | Refills: 0 | Status: SHIPPED | OUTPATIENT
Start: 2021-11-02 | End: 2022-02-17

## 2021-11-03 LAB
BILIRUB UR QL STRIP: NEGATIVE
C TRACH RRNA CVX QL NAA+PROBE: NOT DETECTED
CLARITY UR: ABNORMAL
COLOR UR: YELLOW
GLUCOSE UR STRIP-MCNC: NEGATIVE MG/DL
HGB UR QL STRIP.AUTO: NEGATIVE
KETONES UR QL STRIP: NEGATIVE
LEUKOCYTE ESTERASE UR QL STRIP.AUTO: NEGATIVE
N GONORRHOEA RRNA SPEC QL NAA+PROBE: NOT DETECTED
NITRITE UR QL STRIP: NEGATIVE
PH UR STRIP.AUTO: 6 [PH] (ref 5–8)
PROT UR QL STRIP: NEGATIVE
SP GR UR STRIP: 1.02 (ref 1–1.03)
UROBILINOGEN UR QL STRIP: ABNORMAL

## 2021-11-04 ENCOUNTER — TELEPHONE (OUTPATIENT)
Dept: FAMILY MEDICINE CLINIC | Facility: CLINIC | Age: 36
End: 2021-11-04

## 2021-11-04 NOTE — TELEPHONE ENCOUNTER
----- Message from LINDA Manning sent at 11/4/2021  9:00 AM EDT -----  Normal urine  Is he still symptomatic?

## 2021-11-04 NOTE — TELEPHONE ENCOUNTER
THIS IS WHAT PATIENT HAD ADVISED ME ON 11/2 WHEN HE WAS IN OFFICE REGARDING THE URINARY ISSUES:    Requesting a different ABT than Bactrim due to s/E. Has been taking Azo and drinking Cranberry juice with some relief.

## 2021-11-04 NOTE — TELEPHONE ENCOUNTER
Yes, I discussed that with him   I actually left him a message today to call back when he calls let me know and ill speak with him  Thank you

## 2021-11-08 ENCOUNTER — TELEPHONE (OUTPATIENT)
Dept: FAMILY MEDICINE CLINIC | Facility: CLINIC | Age: 36
End: 2021-11-08

## 2021-11-08 NOTE — TELEPHONE ENCOUNTER
UNABLE TO LEAVE A MESSAGE FOR PATIENT, WE WERE WANTING TO SEE IF PATIENTS APPOINTMENT TOMORROW CAN BE SWITCHED TO A VIDEO VISIT, IF NOT THIS APPT WILL NEED TO BE RS VIKY WILL BE WORKING FROM HOME TOMORROW

## 2021-11-16 ENCOUNTER — HOSPITAL ENCOUNTER (EMERGENCY)
Facility: HOSPITAL | Age: 36
Discharge: HOME | End: 2021-11-16
Admitting: EMERGENCY MEDICINE

## 2021-11-16 VITALS
HEART RATE: 68 BPM | WEIGHT: 307 LBS | OXYGEN SATURATION: 96 % | BODY MASS INDEX: 41.58 KG/M2 | RESPIRATION RATE: 18 BRPM | TEMPERATURE: 98.4 F | SYSTOLIC BLOOD PRESSURE: 128 MMHG | DIASTOLIC BLOOD PRESSURE: 79 MMHG | HEIGHT: 72 IN

## 2021-11-16 DIAGNOSIS — B37.0 THRUSH, ORAL: Primary | ICD-10-CM

## 2021-11-16 LAB — SARS-COV-2 RNA PNL SPEC NAA+PROBE: NOT DETECTED

## 2021-11-16 PROCEDURE — 99283 EMERGENCY DEPT VISIT LOW MDM: CPT

## 2021-11-16 PROCEDURE — C9803 HOPD COVID-19 SPEC COLLECT: HCPCS

## 2021-11-16 PROCEDURE — U0003 INFECTIOUS AGENT DETECTION BY NUCLEIC ACID (DNA OR RNA); SEVERE ACUTE RESPIRATORY SYNDROME CORONAVIRUS 2 (SARS-COV-2) (CORONAVIRUS DISEASE [COVID-19]), AMPLIFIED PROBE TECHNIQUE, MAKING USE OF HIGH THROUGHPUT TECHNOLOGIES AS DESCRIBED BY CMS-2020-01-R: HCPCS

## 2021-11-17 NOTE — ED PROVIDER NOTES
"Subjective    Chief Complaint   Patient presents with   • Rash     Cristel Figueroa, APRN  No LMP for male patient.  Allergies   Allergen Reactions   • Bactrim [Sulfamethoxazole-Trimethoprim] Other (See Comments)     HOT FLASH/ BURNING INSIDE   • Penicillins Hives     History Provided By: Patient    History of Present Illness  Patient is a 36-year-old male presents emergency department with a complaint of a rash on his tongue, he reports it is painful, and is concerned about thrush.  He does report he was on antibiotics about 4 weeks ago.  He denies any fever or chills.  He is not immune compromised.  Review of Systems   Constitutional: Negative for chills and fever.   HENT: Positive for congestion and rhinorrhea.         \"White coating on tongue   Respiratory: Negative for shortness of breath.    Cardiovascular: Negative for chest pain.   Gastrointestinal: Negative for abdominal pain.   Skin: Negative for color change, rash and wound.       Past Medical History:   Diagnosis Date   • Anxiety    • Asthma    • Carpal tunnel syndrome, bilateral    • COVID-19    • Hyperlipidemia    • Hypertension    • Kidney stone    • Low testosterone in male    • Obesity    • FANTA (obstructive sleep apnea)        Allergies   Allergen Reactions   • Bactrim [Sulfamethoxazole-Trimethoprim] Other (See Comments)     HOT FLASH/ BURNING INSIDE   • Penicillins Hives       No past surgical history on file.    Family History   Problem Relation Age of Onset   • Heart attack Mother    • Hyperlipidemia Father    • Hypertension Father    • Stroke Father    • Hyperthyroidism Sister    • Parkinsonism Maternal Grandfather    • Breast cancer Paternal Grandmother    • Stroke Paternal Grandfather    • Nephrolithiasis Paternal Grandfather        Social History     Socioeconomic History   • Marital status:    Tobacco Use   • Smoking status: Former Smoker     Packs/day: 1.00     Years: 5.00     Pack years: 5.00     Types: Cigarettes     Start date: " "     Quit date: 2005     Years since quittin.8   • Smokeless tobacco: Never Used   Substance and Sexual Activity   • Alcohol use: Not Currently     Comment: HX OF ALCOHOLISM    • Drug use: Never   • Sexual activity: Defer           Objective   Physical Exam  Vitals and nursing note reviewed.   Constitutional:       General: He is not in acute distress.     Appearance: Normal appearance. He is not ill-appearing, toxic-appearing or diaphoretic.   HENT:      Head: Normocephalic and atraumatic.      Right Ear: Tympanic membrane, ear canal and external ear normal.      Left Ear: Tympanic membrane, ear canal and external ear normal.      Nose: Nose normal.      Mouth/Throat:      Lips: Pink.      Mouth: Mucous membranes are moist.      Pharynx: Oropharynx is clear. Uvula midline. No oropharyngeal exudate, posterior oropharyngeal erythema or uvula swelling.      Comments: White coating noted tongue, consistent with thrush.  Cardiovascular:      Rate and Rhythm: Normal rate and regular rhythm.      Heart sounds: Normal heart sounds. No murmur heard.  No friction rub. No gallop.    Pulmonary:      Breath sounds: Normal breath sounds.   Abdominal:      General: Bowel sounds are normal.      Palpations: Abdomen is soft.   Musculoskeletal:         General: Normal range of motion.      Cervical back: Normal range of motion and neck supple.   Skin:     General: Skin is warm and dry.      Capillary Refill: Capillary refill takes less than 2 seconds.      Findings: No erythema or rash.   Neurological:      General: No focal deficit present.      Mental Status: He is alert and oriented to person, place, and time.   Psychiatric:         Mood and Affect: Mood normal.         Behavior: Behavior normal.         Procedures           ED Course      /79   Pulse 68   Temp 98.4 °F (36.9 °C)   Resp 18   Ht 182.9 cm (72\")   Wt (!) 139 kg (307 lb)   SpO2 96%   BMI 41.64 kg/m²   Labs Reviewed "   COVID-19,CEPHEID/ROXANA/BDMAX,COR/ALICE/PAD/PRISCILLA IN-HOUSE,NP SWAB IN TRANSPORT MEDIA 3-4 HR TAT, RT-PCR - Normal    Narrative:     Fact sheet for providers: https://www.fda.gov/media/939910/download     Fact sheet for patients: https://www.fda.gov/media/697622/download  Fact sheet for providers: https://www.fda.gov/media/818560/download     Fact sheet for patients: https://www.fda.gov/media/372042/download   COVID PRE-OP / PRE-PROCEDURE SCREENING ORDER (NO ISOLATION)    Narrative:     The following orders were created for panel order COVID PRE-OP / PRE-PROCEDURE SCREENING ORDER (NO ISOLATION) - Swab, Nasopharynx.  Procedure                               Abnormality         Status                     ---------                               -----------         ------                     COVID-19,CEPHEID/ROXANA/BD...[[id number]]  Normal              Final result                 Please view results for these tests on the individual orders.     Medications - No data to display  No radiology results for the last day                                       MDM  Number of Diagnoses or Management Options     Amount and/or Complexity of Data Reviewed  Clinical lab tests: reviewed    Patient is a 36-year-old male presents emergency department with a complaint of painful rash noticed on.  On exam appears consistent with thrush.  She has no other rashes or lesions noted.  I will speak with the patient the bedside, and he asked if this could be related to an exposure to a fungal infection from someone's feet.  Patient does report that he had his mouth on someone's feet, that may have had a fungal infection.  Patient will be treated with nystatin, advised follow-up if not resolved.    I spoke with the patient at the bedside regarding their plan of care, discharge instruction, home care, prescriptions, and importance follow-up.  We discussed test results at the bedside, including incidental abnormal labs, radiological findings, understands  need for follow-up with primary care or specialist if indicated.      Patient was made aware of indications to return to the emergency department.  Patient agrees with the current plan of care for discharge, verbalized understanding of all instructions    Pt is aware that discharge does not mean that nothing is wrong but it indicates no emergency is present and they must continue care with follow-up as given below or physician of their choice    Final diagnoses:   Thrush, oral       ED Disposition  ED Disposition     ED Disposition Condition Comment    Discharge Stable           Cristel Figueroa, APRN  [address] [facility] [place] [address] [phone]    Schedule an appointment as soon as possible for a visit       [facility] EMERGENCY DEPARTMENT  [facility] [facility] [address] [phone]    As needed, If symptoms worsen         Medication List      New Prescriptions    nystatin 100,000 unit/mL suspension  Commonly known as: MYCOSTATIN  Take 5 mL by mouth 4 (Four) Times a Day for 7 days.           Where to Get Your Medications      These medications were sent to [facility] #[id number] - [place], [place] - [address] [place] [address] - [phone]  - [phone] [address] [place] [address]    Hours: 24-hours Phone: [phone]   · nystatin 100,000 unit/mL suspension          Leah Ribeiro, APRN  11/18/21 0841

## 2021-11-17 NOTE — DISCHARGE INSTRUCTIONS
Please follow-up with your primary care provider for a recheck, call tomorrow  Return to the ED for new or worsening symptoms complete entire course of medications

## 2021-11-22 ENCOUNTER — TELEPHONE (OUTPATIENT)
Dept: FAMILY MEDICINE CLINIC | Facility: CLINIC | Age: 36
End: 2021-11-22

## 2021-11-29 NOTE — PROGRESS NOTES
Chief Complaint   Patient presents with   • Urinary Tract Infection     Requesting a different ABT than Bactrim due to s/E. Has been taking Azo and drinking Cranberry juice with some relief.    • Erectile Dysfunction     Would like to have a medication for him to help him maintain his erection.         Subjective   Stephon Martini is a 36 y.o.  male who presents today for   HPI  Stephon Martini  has a past medical history of Anxiety, Asthma, Carpal tunnel syndrome, bilateral, COVID-19, Hyperlipidemia, Hypertension, Kidney stone, Low testosterone in male, Obesity, and FANTA (obstructive sleep apnea).    Allergies   Allergen Reactions   • Bactrim [Sulfamethoxazole-Trimethoprim] Other (See Comments)     HOT FLASH/ BURNING INSIDE   • Penicillins Hives       Current Outpatient Medications:   •  albuterol sulfate HFA (Ventolin HFA) 108 (90 Base) MCG/ACT inhaler, Inhale 2 puffs Every 4 (Four) Hours As Needed for Wheezing or Shortness of Air., Disp: , Rfl:   •  atorvastatin (LIPITOR) 20 MG tablet, Take 1 tablet by mouth Every Night., Disp: 90 tablet, Rfl: 0  •  AZO-CRANBERRY PO, Take 2 each by mouth Daily., Disp: , Rfl:   •  clonazePAM (KlonoPIN) 0.25 MG disintegrating tablet, Place 1 tablet on the tongue 2 (Two) Times a Day As Needed for Anxiety., Disp: 60 tablet, Rfl: 0  •  lisinopril (PRINIVIL,ZESTRIL) 20 MG tablet, Take 1 tablet by mouth Daily., Disp: 90 tablet, Rfl: 0  •  Testosterone Cypionate 200 MG/ML solution, Inject 1 mL as directed Every 14 (Fourteen) Days., Disp: 1.96 mL, Rfl: 1  •  triamterene-hydrochlorothiazide (MAXZIDE-25) 37.5-25 MG per tablet, Take 0.5 tablets by mouth 2 (two) times a day., Disp: 90 tablet, Rfl: 1  •  sildenafil (Viagra) 25 MG tablet, Take 1 tablet by mouth Daily As Needed for Erectile Dysfunction for up to 5 days., Disp: 5 tablet, Rfl: 0  Past Medical History:   Diagnosis Date   • Anxiety    • Asthma    • Carpal tunnel syndrome, bilateral    • COVID-19    • Hyperlipidemia     • Hypertension    • Kidney stone    • Low testosterone in male    • Obesity    • FANTA (obstructive sleep apnea)      No past surgical history on file.  Social History     Socioeconomic History   • Marital status:    Tobacco Use   • Smoking status: Former Smoker     Packs/day: 1.00     Years: 5.00     Pack years: 5.00     Types: Cigarettes     Start date:      Quit date:      Years since quittin.9   • Smokeless tobacco: Never Used   Substance and Sexual Activity   • Alcohol use: Not Currently     Comment: HX OF ALCOHOLISM    • Drug use: Never   • Sexual activity: Defer     Family History   Problem Relation Age of Onset   • Heart attack Mother    • Hyperlipidemia Father    • Hypertension Father    • Stroke Father    • Hyperthyroidism Sister    • Parkinsonism Maternal Grandfather    • Breast cancer Paternal Grandmother    • Stroke Paternal Grandfather    • Nephrolithiasis Paternal Grandfather      PHQ-2 Depression Screening  Little interest or pleasure in doing things?     Feeling down, depressed, or hopeless?     PHQ-2 Total Score       PHQ-9 Depression Screening  Little interest or pleasure in doing things?     Feeling down, depressed, or hopeless?     Trouble falling or staying asleep, or sleeping too much?     Feeling tired or having little energy?     Poor appetite or overeating?     Feeling bad about yourself - or that you are a failure or have let yourself or your family down?     Trouble concentrating on things, such as reading the newspaper or watching television?     Moving or speaking so slowly that other people could have noticed? Or the opposite - being so fidgety or restless that you have been moving around a lot more than usual?     Thoughts that you would be better off dead, or of hurting yourself in some way?     PHQ-9 Total Score     If you checked off any problems, how difficult have these problems made it for you to do your work, take care of things at home, or get along with  "other people?         Family history, surgical history, past medical history, Allergies and med's reviewed with patient today and updated in [facility] EMR.     ROS:  Review of Systems    OBJECTIVE:  Vitals:    11/02/21 1620   BP: 125/78   BP Location: Left arm   Patient Position: Sitting   Cuff Size: Large Adult   Pulse: 74   Resp: 16   Temp: 98.4 °F (36.9 °C)   TempSrc: Infrared   SpO2: 99%   Weight: (!) 143 kg (315 lb 9.6 oz)   Height: 182.9 cm (72\")     Body mass index is 42.8 kg/m².  Physical Exam    ASSESSMENT/ PLAN:    Diagnoses and all orders for this visit:    1. Dysuria (Primary)  -     Urinalysis With Culture If Indicated -; Future  -     Chlamydia trachomatis, Neisseria gonorrhoeae, PCR - Urine, Urine, Clean Catch; Future  -     Urinalysis With Culture If Indicated - Urine, Clean Catch  -     Chlamydia trachomatis, Neisseria gonorrhoeae, PCR - Urine, Urine, Clean Catch    2. Hypogonadism in male  -     Testosterone Cypionate 200 MG/ML solution; Inject 1 mL as directed Every 14 (Fourteen) Days.  Dispense: 1.96 mL; Refill: 1    3. STI (sexually transmitted infection)  -     Urinalysis With Culture If Indicated -; Future  -     Chlamydia trachomatis, Neisseria gonorrhoeae, PCR - Urine, Urine, Clean Catch; Future  -     Urinalysis With Culture If Indicated - Urine, Clean Catch  -     Chlamydia trachomatis, Neisseria gonorrhoeae, PCR - Urine, Urine, Clean Catch    4. Erectile dysfunction due to arterial insufficiency  -     sildenafil (Viagra) 25 MG tablet; Take 1 tablet by mouth Daily As Needed for Erectile Dysfunction for up to 5 days.  Dispense: 5 tablet; Refill: 0        Orders Placed Today:     New Medications Ordered This Visit   Medications   • sildenafil (Viagra) 25 MG tablet     Sig: Take 1 tablet by mouth Daily As Needed for Erectile Dysfunction for up to 5 days.     Dispense:  5 tablet     Refill:  0   • Testosterone Cypionate 200 MG/ML solution     Sig: Inject 1 mL as directed Every 14 (Fourteen) Days. "     Dispense:  1.96 mL     Refill:  1        Management Plan:     An After Visit Summary was printed and given to the patient at discharge.    Follow-up: Return in about 3 weeks (around 11/23/2021).    Cristel Figueroa, LINDA 11/28/2021 21:47 EST  This note was electronically signed.

## 2021-11-30 DIAGNOSIS — E29.1 HYPOGONADISM IN MALE: ICD-10-CM

## 2021-12-01 RX ORDER — TRIAMTERENE AND HYDROCHLOROTHIAZIDE 37.5; 25 MG/1; MG/1
0.5 TABLET ORAL DAILY
Qty: 90 TABLET | Refills: 1 | Status: SHIPPED | OUTPATIENT
Start: 2021-12-01 | End: 2021-12-17 | Stop reason: SDUPTHER

## 2021-12-01 RX ORDER — TESTOSTERONE CYPIONATE 200 MG/ML
200 VIAL (ML) INTRAMUSCULAR
Qty: 1.96 ML | Refills: 1 | Status: SHIPPED | OUTPATIENT
Start: 2021-12-01 | End: 2021-12-30 | Stop reason: SDUPTHER

## 2021-12-15 ENCOUNTER — TELEPHONE (OUTPATIENT)
Dept: FAMILY MEDICINE CLINIC | Facility: CLINIC | Age: 36
End: 2021-12-15

## 2021-12-15 NOTE — TELEPHONE ENCOUNTER
Caller: Stephon Martini    Relationship: Self    Best call back number: [phone]*    What is the best time to reach you: ANYTIME    Who are you requesting to speak with (clinical staff, provider,  specific staff member): CLINICAL    What was the call regarding: PATIENT STATES THAT HE GOT HIS MEDICATION FROM THE PHARMACY, AND THE triamterene-hydrochlorothiazide (MAXZIDE-25) 37.5-25 MG per tablet HAS BEEN LOWERED TO 1/2 TABLET DAILY, WHEN HE WAS TAKING A WHOLE TABLET DAILY.     Do you require a callback: YES, TO ADVISE.

## 2021-12-17 RX ORDER — TRIAMTERENE AND HYDROCHLOROTHIAZIDE 37.5; 25 MG/1; MG/1
1 TABLET ORAL DAILY
Qty: 90 TABLET | Refills: 1 | Status: SHIPPED | OUTPATIENT
Start: 2021-12-17 | End: 2022-02-17 | Stop reason: SDUPTHER

## 2021-12-30 DIAGNOSIS — E29.1 HYPOGONADISM IN MALE: ICD-10-CM

## 2021-12-30 RX ORDER — TESTOSTERONE CYPIONATE 200 MG/ML
200 VIAL (ML) INTRAMUSCULAR
Qty: 2 ML | Refills: 1 | Status: SHIPPED | OUTPATIENT
Start: 2021-12-30 | End: 2022-01-28 | Stop reason: SDUPTHER

## 2021-12-30 NOTE — TELEPHONE ENCOUNTER
Rx Refill Note  Requested Prescriptions     Pending Prescriptions Disp Refills   • Testosterone Cypionate 200 MG/ML solution 1.96 mL 1     Sig: Inject 1 mL as directed Every 14 (Fourteen) Days.      Last office visit with prescribing clinician: 11/2/2021      Next office visit with prescribing clinician: NONE       Testosterone (06/09/2021 09:59)  CBC w AUTO Differential (08/04/2021 10:13)  Comprehensive Metabolic Panel (06/09/2021 09:59)  [facility] - SCAN - INSPECT/ [facility]/ 12- (12/30/2021)           Anila Kat LPN  12/30/21, 12:11 EST

## 2021-12-30 NOTE — TELEPHONE ENCOUNTER
Caller: Stephon Martini    Relationship: Self    Best call back number: [phone]    Requested Prescriptions:   Requested Prescriptions     Pending Prescriptions Disp Refills   • Testosterone Cypionate 200 MG/ML solution 1.96 mL 1     Sig: Inject 1 mL as directed Every 14 (Fourteen) Days.      Pharmacy where request should be sent: [facility] [address] - [phone]  - [phone] FX     Additional details provided by patient:     Does the patient have less than a 3 day supply:  [x] Yes  [] No    Florin Horton Rep   12/30/21 08:40 EST

## 2022-01-04 RX ORDER — LISINOPRIL 20 MG/1
TABLET ORAL
Qty: 30 TABLET | Refills: 3 | Status: SHIPPED | OUTPATIENT
Start: 2022-01-04 | End: 2022-02-17 | Stop reason: SDUPTHER

## 2022-01-27 ENCOUNTER — TELEPHONE (OUTPATIENT)
Dept: FAMILY MEDICINE CLINIC | Facility: CLINIC | Age: 37
End: 2022-01-27

## 2022-01-27 NOTE — TELEPHONE ENCOUNTER
Caller: Stephon Martini    Relationship: Self    Best call back number:[phone]     Requested Prescriptions:    atorvastatin (LIPITOR) 20 MG tablet   Testosterone Cypionate 200 MG/ML solution       Pharmacy where request should be sent:    [facility] [address] - [phone]  - [phone]   [phone]  Additional details provided by patient: PATIENT STATES HE TOOL LAST ATORVASTATIN YESTERDAY.    Does the patient have less than a 3 day supply:  [x] Yes  [] No    Chani Sotomayor, RegSched Rep   01/27/22 10:35 EST     PLEASE ADVISE.

## 2022-01-28 DIAGNOSIS — E29.1 HYPOGONADISM IN MALE: ICD-10-CM

## 2022-01-28 RX ORDER — ATORVASTATIN CALCIUM 20 MG/1
20 TABLET, FILM COATED ORAL NIGHTLY
Qty: 90 TABLET | Refills: 0 | Status: SHIPPED | OUTPATIENT
Start: 2022-01-28 | End: 2022-02-17 | Stop reason: SDUPTHER

## 2022-01-28 RX ORDER — TESTOSTERONE CYPIONATE 200 MG/ML
200 VIAL (ML) INTRAMUSCULAR
Qty: 2 ML | Refills: 1 | Status: SHIPPED | OUTPATIENT
Start: 2022-01-28 | End: 2022-03-15 | Stop reason: SDUPTHER

## 2022-02-17 ENCOUNTER — OFFICE VISIT (OUTPATIENT)
Dept: FAMILY MEDICINE CLINIC | Facility: CLINIC | Age: 37
End: 2022-02-17

## 2022-02-17 VITALS
OXYGEN SATURATION: 96 % | HEIGHT: 72 IN | SYSTOLIC BLOOD PRESSURE: 133 MMHG | TEMPERATURE: 97.8 F | WEIGHT: 310 LBS | BODY MASS INDEX: 41.99 KG/M2 | DIASTOLIC BLOOD PRESSURE: 84 MMHG | RESPIRATION RATE: 18 BRPM | HEART RATE: 80 BPM

## 2022-02-17 DIAGNOSIS — Z79.899 MEDICATION MANAGEMENT: ICD-10-CM

## 2022-02-17 DIAGNOSIS — M79.10 MUSCLE ACHE: ICD-10-CM

## 2022-02-17 DIAGNOSIS — E29.1 HYPOGONADISM IN MALE: Primary | ICD-10-CM

## 2022-02-17 DIAGNOSIS — E55.9 VITAMIN D DEFICIENCY: ICD-10-CM

## 2022-02-17 DIAGNOSIS — E53.8 VITAMIN B12 DEFICIENCY: ICD-10-CM

## 2022-02-17 DIAGNOSIS — F41.9 ANXIETY: ICD-10-CM

## 2022-02-17 DIAGNOSIS — E78.2 MIXED HYPERLIPIDEMIA: ICD-10-CM

## 2022-02-17 LAB
25(OH)D3 SERPL-MCNC: 18.8 NG/ML (ref 30–100)
ALBUMIN SERPL-MCNC: 4.5 G/DL (ref 3.5–5.2)
ALBUMIN/GLOB SERPL: 1.6 G/DL
ALP SERPL-CCNC: 79 U/L (ref 39–117)
ALT SERPL W P-5'-P-CCNC: 24 U/L (ref 1–41)
ANION GAP SERPL CALCULATED.3IONS-SCNC: 9 MMOL/L (ref 5–15)
AST SERPL-CCNC: 17 U/L (ref 1–40)
BASOPHILS # BLD AUTO: 0.03 10*3/MM3 (ref 0–0.2)
BASOPHILS NFR BLD AUTO: 0.4 % (ref 0–1.5)
BILIRUB SERPL-MCNC: 0.6 MG/DL (ref 0–1.2)
BUN SERPL-MCNC: 12 MG/DL (ref 6–20)
BUN/CREAT SERPL: 13 (ref 7–25)
CALCIUM SPEC-SCNC: 9.2 MG/DL (ref 8.6–10.5)
CHLORIDE SERPL-SCNC: 102 MMOL/L (ref 98–107)
CHOLEST SERPL-MCNC: 201 MG/DL (ref 0–200)
CHROMATIN AB SERPL-ACNC: <10 IU/ML (ref 0–14)
CO2 SERPL-SCNC: 26 MMOL/L (ref 22–29)
CREAT SERPL-MCNC: 0.92 MG/DL (ref 0.76–1.27)
CRP SERPL-MCNC: <0.3 MG/DL (ref 0–0.5)
DEPRECATED RDW RBC AUTO: 38.1 FL (ref 37–54)
EOSINOPHIL # BLD AUTO: 0.08 10*3/MM3 (ref 0–0.4)
EOSINOPHIL NFR BLD AUTO: 1.2 % (ref 0.3–6.2)
ERYTHROCYTE [DISTWIDTH] IN BLOOD BY AUTOMATED COUNT: 13.7 % (ref 12.3–15.4)
GFR SERPL CREATININE-BSD FRML MDRD: 93 ML/MIN/1.73
GLOBULIN UR ELPH-MCNC: 2.9 GM/DL
GLUCOSE SERPL-MCNC: 95 MG/DL (ref 65–99)
HCT VFR BLD AUTO: 46.9 % (ref 37.5–51)
HDLC SERPL-MCNC: 51 MG/DL (ref 40–60)
HGB BLD-MCNC: 16 G/DL (ref 13–17.7)
IMM GRANULOCYTES # BLD AUTO: 0.07 10*3/MM3 (ref 0–0.05)
IMM GRANULOCYTES NFR BLD AUTO: 1 % (ref 0–0.5)
LDLC SERPL CALC-MCNC: 123 MG/DL (ref 0–100)
LDLC/HDLC SERPL: 2.35 {RATIO}
LYMPHOCYTES # BLD AUTO: 1.83 10*3/MM3 (ref 0.7–3.1)
LYMPHOCYTES NFR BLD AUTO: 26.6 % (ref 19.6–45.3)
MCH RBC QN AUTO: 26.8 PG (ref 26.6–33)
MCHC RBC AUTO-ENTMCNC: 34.1 G/DL (ref 31.5–35.7)
MCV RBC AUTO: 78.7 FL (ref 79–97)
MONOCYTES # BLD AUTO: 0.49 10*3/MM3 (ref 0.1–0.9)
MONOCYTES NFR BLD AUTO: 7.1 % (ref 5–12)
NEUTROPHILS NFR BLD AUTO: 4.37 10*3/MM3 (ref 1.7–7)
NEUTROPHILS NFR BLD AUTO: 63.7 % (ref 42.7–76)
NRBC BLD AUTO-RTO: 0 /100 WBC (ref 0–0.2)
PLATELET # BLD AUTO: 243 10*3/MM3 (ref 140–450)
PMV BLD AUTO: 10.1 FL (ref 6–12)
POC AMPHETAMINES: NEGATIVE
POC BARBITURATES: NEGATIVE
POC BENZODIAZEPHINES: NEGATIVE
POC COCAINE: NEGATIVE
POC METHADONE: NEGATIVE
POC METHAMPHETAMINE SCREEN URINE: NEGATIVE
POC OPIATES: NEGATIVE
POC OXYCODONE: NEGATIVE
POC PHENCYCLIDINE: NEGATIVE
POC PROPOXYPHENE: NEGATIVE
POC THC: NEGATIVE
POC TRICYCLIC ANTIDEPRESSANTS: NEGATIVE
POTASSIUM SERPL-SCNC: 4 MMOL/L (ref 3.5–5.2)
PROT SERPL-MCNC: 7.4 G/DL (ref 6–8.5)
RBC # BLD AUTO: 5.96 10*6/MM3 (ref 4.14–5.8)
SODIUM SERPL-SCNC: 137 MMOL/L (ref 136–145)
T4 FREE SERPL-MCNC: 1.1 NG/DL (ref 0.93–1.7)
TESTOST SERPL-MCNC: 475 NG/DL (ref 249–836)
TRIGL SERPL-MCNC: 152 MG/DL (ref 0–150)
TSH SERPL DL<=0.05 MIU/L-ACNC: 1.18 UIU/ML (ref 0.27–4.2)
URATE SERPL-MCNC: 8.5 MG/DL (ref 3.4–7)
VIT B12 BLD-MCNC: 370 PG/ML (ref 211–946)
VLDLC SERPL-MCNC: 27 MG/DL (ref 5–40)
WBC NRBC COR # BLD: 6.87 10*3/MM3 (ref 3.4–10.8)

## 2022-02-17 PROCEDURE — 80305 DRUG TEST PRSMV DIR OPT OBS: CPT | Performed by: NURSE PRACTITIONER

## 2022-02-17 PROCEDURE — 86431 RHEUMATOID FACTOR QUANT: CPT | Performed by: NURSE PRACTITIONER

## 2022-02-17 PROCEDURE — 84403 ASSAY OF TOTAL TESTOSTERONE: CPT | Performed by: NURSE PRACTITIONER

## 2022-02-17 PROCEDURE — 80050 GENERAL HEALTH PANEL: CPT | Performed by: NURSE PRACTITIONER

## 2022-02-17 PROCEDURE — 86038 ANTINUCLEAR ANTIBODIES: CPT | Performed by: NURSE PRACTITIONER

## 2022-02-17 PROCEDURE — 84439 ASSAY OF FREE THYROXINE: CPT | Performed by: NURSE PRACTITIONER

## 2022-02-17 PROCEDURE — 82607 VITAMIN B-12: CPT | Performed by: NURSE PRACTITIONER

## 2022-02-17 PROCEDURE — 86140 C-REACTIVE PROTEIN: CPT | Performed by: NURSE PRACTITIONER

## 2022-02-17 PROCEDURE — 82306 VITAMIN D 25 HYDROXY: CPT | Performed by: NURSE PRACTITIONER

## 2022-02-17 PROCEDURE — 80061 LIPID PANEL: CPT | Performed by: NURSE PRACTITIONER

## 2022-02-17 PROCEDURE — 99214 OFFICE O/P EST MOD 30 MIN: CPT | Performed by: NURSE PRACTITIONER

## 2022-02-17 PROCEDURE — 84550 ASSAY OF BLOOD/URIC ACID: CPT | Performed by: NURSE PRACTITIONER

## 2022-02-17 RX ORDER — LISINOPRIL 20 MG/1
20 TABLET ORAL DAILY
Qty: 90 TABLET | Refills: 0 | Status: SHIPPED | OUTPATIENT
Start: 2022-02-17 | End: 2022-05-27 | Stop reason: SDUPTHER

## 2022-02-17 RX ORDER — CLONAZEPAM 0.25 MG/1
0.25 TABLET, ORALLY DISINTEGRATING ORAL 2 TIMES DAILY PRN
Qty: 60 TABLET | Refills: 0 | Status: SHIPPED | OUTPATIENT
Start: 2022-02-17 | End: 2022-05-25 | Stop reason: SDUPTHER

## 2022-02-17 RX ORDER — ATORVASTATIN CALCIUM 20 MG/1
20 TABLET, FILM COATED ORAL NIGHTLY
Qty: 90 TABLET | Refills: 0 | Status: SHIPPED | OUTPATIENT
Start: 2022-02-17 | End: 2022-05-23 | Stop reason: SINTOL

## 2022-02-17 RX ORDER — TRIAMTERENE AND HYDROCHLOROTHIAZIDE 37.5; 25 MG/1; MG/1
1 TABLET ORAL DAILY
Qty: 90 TABLET | Refills: 1 | Status: SHIPPED | OUTPATIENT
Start: 2022-02-17 | End: 2022-05-27 | Stop reason: SDUPTHER

## 2022-02-18 LAB
ANA SER QL: NEGATIVE
ANA SER QL: NEGATIVE

## 2022-03-02 ENCOUNTER — OFFICE VISIT (OUTPATIENT)
Dept: FAMILY MEDICINE CLINIC | Facility: CLINIC | Age: 37
End: 2022-03-02

## 2022-03-02 VITALS
OXYGEN SATURATION: 96 % | WEIGHT: 310 LBS | TEMPERATURE: 97.8 F | SYSTOLIC BLOOD PRESSURE: 140 MMHG | BODY MASS INDEX: 41.99 KG/M2 | HEIGHT: 72 IN | DIASTOLIC BLOOD PRESSURE: 77 MMHG | HEART RATE: 93 BPM

## 2022-03-02 DIAGNOSIS — M79.10 MUSCLE ACHE: Primary | ICD-10-CM

## 2022-03-02 PROCEDURE — 99214 OFFICE O/P EST MOD 30 MIN: CPT | Performed by: NURSE PRACTITIONER

## 2022-03-02 PROCEDURE — 85025 COMPLETE CBC W/AUTO DIFF WBC: CPT | Performed by: NURSE PRACTITIONER

## 2022-03-02 PROCEDURE — 84550 ASSAY OF BLOOD/URIC ACID: CPT | Performed by: NURSE PRACTITIONER

## 2022-03-02 PROCEDURE — 82550 ASSAY OF CK (CPK): CPT | Performed by: NURSE PRACTITIONER

## 2022-03-02 PROCEDURE — 83735 ASSAY OF MAGNESIUM: CPT | Performed by: NURSE PRACTITIONER

## 2022-03-02 PROCEDURE — 86140 C-REACTIVE PROTEIN: CPT | Performed by: NURSE PRACTITIONER

## 2022-03-02 PROCEDURE — 86431 RHEUMATOID FACTOR QUANT: CPT | Performed by: NURSE PRACTITIONER

## 2022-03-02 PROCEDURE — 86038 ANTINUCLEAR ANTIBODIES: CPT | Performed by: NURSE PRACTITIONER

## 2022-03-02 RX ORDER — MELOXICAM 7.5 MG/1
7.5 TABLET ORAL DAILY
Qty: 30 TABLET | Refills: 0 | Status: SHIPPED | OUTPATIENT
Start: 2022-03-02 | End: 2022-04-01

## 2022-03-02 NOTE — PROGRESS NOTES
Venipuncture Blood Specimen Collection  Venipuncture performed in [facility] by James Oconnell CMA with good hemostasis. Patient tolerated the procedure well without complications.   03/02/22   James Oconnell CMA

## 2022-03-02 NOTE — PROGRESS NOTES
Chief Complaint   Patient presents with   • Leg Pain     bilateral leg pain x 3 weeks   • Follow-up        Subjective   Stephon Martini is a 36 y.o.  male who presents today for   HPI  Stephon Martini  has a past medical history of Anxiety, Asthma, Carpal tunnel syndrome, bilateral, COVID-19, Hyperlipidemia, Hypertension, Kidney stone, Low testosterone in male, Obesity, and FANTA (obstructive sleep apnea).    Allergies   Allergen Reactions   • Bactrim [Sulfamethoxazole-Trimethoprim] Other (See Comments)     HOT FLASH/ BURNING INSIDE   • Penicillins Hives       Current Outpatient Medications:   •  albuterol sulfate HFA (Ventolin HFA) 108 (90 Base) MCG/ACT inhaler, Inhale 2 puffs Every 4 (Four) Hours As Needed for Wheezing or Shortness of Air., Disp: , Rfl:   •  atorvastatin (LIPITOR) 20 MG tablet, Take 1 tablet by mouth Every Night., Disp: 90 tablet, Rfl: 0  •  clonazePAM (KlonoPIN) 0.25 MG disintegrating tablet, Place 1 tablet on the tongue 2 (Two) Times a Day As Needed for Anxiety., Disp: 60 tablet, Rfl: 0  •  lisinopril (PRINIVIL,ZESTRIL) 20 MG tablet, Take 1 tablet by mouth Daily., Disp: 90 tablet, Rfl: 0  •  Testosterone Cypionate 200 MG/ML solution, Inject 1 mL as directed Every 14 (Fourteen) Days., Disp: 2 mL, Rfl: 1  •  triamterene-hydrochlorothiazide (MAXZIDE-25) 37.5-25 MG per tablet, Take 1 tablet by mouth Daily., Disp: 90 tablet, Rfl: 1  •  AZO-CRANBERRY PO, Take 2 each by mouth Daily., Disp: , Rfl:   •  meloxicam (Mobic) 7.5 MG tablet, Take 1 tablet by mouth Daily for 30 days., Disp: 30 tablet, Rfl: 0  Past Medical History:   Diagnosis Date   • Anxiety    • Asthma    • Carpal tunnel syndrome, bilateral    • COVID-19    • Hyperlipidemia    • Hypertension    • Kidney stone    • Low testosterone in male    • Obesity    • FANTA (obstructive sleep apnea)      No past surgical history on file.  Social History     Socioeconomic History   • Marital status:    Tobacco Use   • Smoking status:  Former Smoker     Packs/day: 1.00     Years: 5.00     Pack years: 5.00     Types: Cigarettes     Start date:      Quit date:      Years since quittin.1   • Smokeless tobacco: Never Used   Substance and Sexual Activity   • Alcohol use: Not Currently     Comment: HX OF ALCOHOLISM    • Drug use: Never   • Sexual activity: Defer     Family History   Problem Relation Age of Onset   • Heart attack Mother    • Hyperlipidemia Father    • Hypertension Father    • Stroke Father    • Hyperthyroidism Sister    • Parkinsonism Maternal Grandfather    • Breast cancer Paternal Grandmother    • Stroke Paternal Grandfather    • Nephrolithiasis Paternal Grandfather      PHQ-2 Depression Screening  Little interest or pleasure in doing things?     Feeling down, depressed, or hopeless?     PHQ-2 Total Score       PHQ-9 Depression Screening  Little interest or pleasure in doing things?     Feeling down, depressed, or hopeless?     Trouble falling or staying asleep, or sleeping too much?     Feeling tired or having little energy?     Poor appetite or overeating?     Feeling bad about yourself - or that you are a failure or have let yourself or your family down?     Trouble concentrating on things, such as reading the newspaper or watching television?     Moving or speaking so slowly that other people could have noticed? Or the opposite - being so fidgety or restless that you have been moving around a lot more than usual?     Thoughts that you would be better off dead, or of hurting yourself in some way?     PHQ-9 Total Score     If you checked off any problems, how difficult have these problems made it for you to do your work, take care of things at home, or get along with other people?         Family history, surgical history, past medical history, Allergies and med's reviewed with patient today and updated in [facility] EMR.     ROS:  Review of Systems    OBJECTIVE:  Vitals:    22 1259   BP: 140/77   Pulse: 93   Temp: 97.8  "°F (36.6 °C)   TempSrc: Infrared   SpO2: 96%   Weight: (!) 141 kg (310 lb)   Height: 182.9 cm (72\")     Body mass index is 42.04 kg/m².  Physical Exam  Vitals and nursing note reviewed.   Constitutional:       Appearance: Normal appearance.   HENT:      Head: Normocephalic.      Mouth/Throat:      Mouth: Mucous membranes are moist.   Eyes:      Pupils: Pupils are equal, round, and reactive to light.   Cardiovascular:      Rate and Rhythm: Normal rate.      Pulses: Normal pulses.      Heart sounds: Normal heart sounds.   Pulmonary:      Effort: Pulmonary effort is normal.      Breath sounds: Normal breath sounds.   Abdominal:      General: Abdomen is flat. Bowel sounds are normal.      Palpations: Abdomen is soft.   Musculoskeletal:         General: Normal range of motion.      Cervical back: Normal range of motion.   Skin:     General: Skin is warm and dry.   Neurological:      General: No focal deficit present.      Mental Status: He is alert.   Psychiatric:         Mood and Affect: Mood normal.         Thought Content: Thought content normal.         ASSESSMENT/ PLAN:    Diagnoses and all orders for this visit:    1. Muscle ache (Primary)  -     Magnesium; Future  -     CBC & Differential; Future    Other orders  -     meloxicam (Mobic) 7.5 MG tablet; Take 1 tablet by mouth Daily for 30 days.  Dispense: 30 tablet; Refill: 0        Orders Placed Today:     New Medications Ordered This Visit   Medications   • meloxicam (Mobic) 7.5 MG tablet     Sig: Take 1 tablet by mouth Daily for 30 days.     Dispense:  30 tablet     Refill:  0        Management Plan:     An After Visit Summary was printed and given to the patient at discharge.    Follow-up: No follow-ups on file.    LINDA Strong 3/2/2022 13:08 EST  This note was electronically signed.      "

## 2022-03-03 LAB
BASOPHILS # BLD AUTO: 0.03 10*3/MM3 (ref 0–0.2)
BASOPHILS NFR BLD AUTO: 0.4 % (ref 0–1.5)
CHROMATIN AB SERPL-ACNC: <10 IU/ML (ref 0–14)
CK SERPL-CCNC: 131 U/L (ref 20–200)
CRP SERPL-MCNC: <0.3 MG/DL (ref 0–0.5)
DEPRECATED RDW RBC AUTO: 41.6 FL (ref 37–54)
EOSINOPHIL # BLD AUTO: 0.11 10*3/MM3 (ref 0–0.4)
EOSINOPHIL NFR BLD AUTO: 1.5 % (ref 0.3–6.2)
ERYTHROCYTE [DISTWIDTH] IN BLOOD BY AUTOMATED COUNT: 14 % (ref 12.3–15.4)
HCT VFR BLD AUTO: 50.2 % (ref 37.5–51)
HGB BLD-MCNC: 16.2 G/DL (ref 13–17.7)
IMM GRANULOCYTES # BLD AUTO: 0.05 10*3/MM3 (ref 0–0.05)
IMM GRANULOCYTES NFR BLD AUTO: 0.7 % (ref 0–0.5)
LYMPHOCYTES # BLD AUTO: 1.55 10*3/MM3 (ref 0.7–3.1)
LYMPHOCYTES NFR BLD AUTO: 20.9 % (ref 19.6–45.3)
MAGNESIUM SERPL-MCNC: 2.1 MG/DL (ref 1.6–2.6)
MCH RBC QN AUTO: 26.7 PG (ref 26.6–33)
MCHC RBC AUTO-ENTMCNC: 32.3 G/DL (ref 31.5–35.7)
MCV RBC AUTO: 82.8 FL (ref 79–97)
MONOCYTES # BLD AUTO: 0.47 10*3/MM3 (ref 0.1–0.9)
MONOCYTES NFR BLD AUTO: 6.4 % (ref 5–12)
NEUTROPHILS NFR BLD AUTO: 5.19 10*3/MM3 (ref 1.7–7)
NEUTROPHILS NFR BLD AUTO: 70.1 % (ref 42.7–76)
NRBC BLD AUTO-RTO: 0 /100 WBC (ref 0–0.2)
PLATELET # BLD AUTO: 264 10*3/MM3 (ref 140–450)
PMV BLD AUTO: 10.1 FL (ref 6–12)
RBC # BLD AUTO: 6.06 10*6/MM3 (ref 4.14–5.8)
URATE SERPL-MCNC: 8.2 MG/DL (ref 3.4–7)
WBC NRBC COR # BLD: 7.4 10*3/MM3 (ref 3.4–10.8)

## 2022-03-04 LAB — ANA SER QL: NEGATIVE

## 2022-03-09 ENCOUNTER — OFFICE VISIT (OUTPATIENT)
Dept: FAMILY MEDICINE CLINIC | Facility: CLINIC | Age: 37
End: 2022-03-09

## 2022-03-09 VITALS
DIASTOLIC BLOOD PRESSURE: 86 MMHG | TEMPERATURE: 98.4 F | HEART RATE: 77 BPM | OXYGEN SATURATION: 96 % | BODY MASS INDEX: 42.64 KG/M2 | SYSTOLIC BLOOD PRESSURE: 146 MMHG | RESPIRATION RATE: 18 BRPM | WEIGHT: 314.8 LBS | HEIGHT: 72 IN

## 2022-03-09 DIAGNOSIS — M79.10 MUSCLE ACHE: ICD-10-CM

## 2022-03-09 DIAGNOSIS — E29.1 HYPOGONADISM IN MALE: Primary | ICD-10-CM

## 2022-03-09 PROCEDURE — 99213 OFFICE O/P EST LOW 20 MIN: CPT | Performed by: NURSE PRACTITIONER

## 2022-03-09 NOTE — PROGRESS NOTES
Chief Complaint   Patient presents with   • Leg Pain     Both sides         Subjective   Stephon Martini is a 36 y.o.  male who presents today for     • Leg Pain    Both sides is concerned its related too testosterone deficiency needs labs for workup      Stephon Martini  has a past medical history of Anxiety, Asthma, Carpal tunnel syndrome, bilateral, COVID-19, Hyperlipidemia, Hypertension, Kidney stone, Low testosterone in male, Obesity, and FANTA (obstructive sleep apnea).    Allergies   Allergen Reactions   • Bactrim [Sulfamethoxazole-Trimethoprim] Other (See Comments)     HOT FLASH/ BURNING INSIDE   • Penicillins Hives       Current Outpatient Medications:   •  albuterol sulfate  (90 Base) MCG/ACT inhaler, Inhale 2 puffs Every 4 (Four) Hours As Needed for Wheezing or Shortness of Air., Disp: , Rfl:   •  atorvastatin (LIPITOR) 20 MG tablet, Take 1 tablet by mouth Every Night., Disp: 90 tablet, Rfl: 0  •  clonazePAM (KlonoPIN) 0.25 MG disintegrating tablet, Place 1 tablet on the tongue 2 (Two) Times a Day As Needed for Anxiety., Disp: 60 tablet, Rfl: 0  •  lisinopril (PRINIVIL,ZESTRIL) 20 MG tablet, Take 1 tablet by mouth Daily., Disp: 90 tablet, Rfl: 0  •  triamterene-hydrochlorothiazide (MAXZIDE-25) 37.5-25 MG per tablet, Take 1 tablet by mouth Daily., Disp: 90 tablet, Rfl: 1  •  allopurinol (Zyloprim) 100 MG tablet, Take 1 tablet by mouth Daily., Disp: 90 tablet, Rfl: 0  •  AZO-CRANBERRY PO, Take 2 each by mouth Daily., Disp: , Rfl:   •  Testosterone Cypionate 200 MG/ML solution, Inject 1 mL as directed Every 14 (Fourteen) Days., Disp: 2 mL, Rfl: 1  Past Medical History:   Diagnosis Date   • Anxiety    • Asthma    • Carpal tunnel syndrome, bilateral    • COVID-19    • Hyperlipidemia    • Hypertension    • Kidney stone    • Low testosterone in male    • Obesity    • FANTA (obstructive sleep apnea)      No past surgical history on file.  Social History     Socioeconomic History   • Marital  status:    Tobacco Use   • Smoking status: Former Smoker     Packs/day: 1.00     Years: 5.00     Pack years: 5.00     Types: Cigarettes     Start date:      Quit date:      Years since quittin.2   • Smokeless tobacco: Never Used   Substance and Sexual Activity   • Alcohol use: Not Currently     Comment: HX OF ALCOHOLISM    • Drug use: Never   • Sexual activity: Defer     Family History   Problem Relation Age of Onset   • Heart attack Mother    • Hyperlipidemia Father    • Hypertension Father    • Stroke Father    • Hyperthyroidism Sister    • Parkinsonism Maternal Grandfather    • Breast cancer Paternal Grandmother    • Stroke Paternal Grandfather    • Nephrolithiasis Paternal Grandfather      PHQ-2 Depression Screening  Little interest or pleasure in doing things?     Feeling down, depressed, or hopeless?     PHQ-2 Total Score       PHQ-9 Depression Screening  Little interest or pleasure in doing things?     Feeling down, depressed, or hopeless?     Trouble falling or staying asleep, or sleeping too much?     Feeling tired or having little energy?     Poor appetite or overeating?     Feeling bad about yourself - or that you are a failure or have let yourself or your family down?     Trouble concentrating on things, such as reading the newspaper or watching television?     Moving or speaking so slowly that other people could have noticed? Or the opposite - being so fidgety or restless that you have been moving around a lot more than usual?     Thoughts that you would be better off dead, or of hurting yourself in some way?     PHQ-9 Total Score     If you checked off any problems, how difficult have these problems made it for you to do your work, take care of things at home, or get along with other people?         Family history, surgical history, past medical history, Allergies and med's reviewed with patient today and updated in [employer] EMR.     ROS:  Review of Systems   Musculoskeletal: Positive  "for myalgias.   All other systems reviewed and are negative.      OBJECTIVE:  Vitals:    03/09/22 0828   BP: 146/86   Pulse: 77   Resp: 18   Temp: 98.4 °F (36.9 °C)   TempSrc: Infrared   SpO2: 96%   Weight: (!) 143 kg (314 lb 12.8 oz)   Height: 182.9 cm (72\")     Body mass index is 42.69 kg/m².  Physical Exam  Vitals and nursing note reviewed.   Constitutional:       Appearance: Normal appearance. He is well-developed.   HENT:      Head: Normocephalic.   Eyes:      Pupils: Pupils are equal, round, and reactive to light.   Cardiovascular:      Rate and Rhythm: Normal rate and regular rhythm.   Pulmonary:      Effort: Pulmonary effort is normal.      Breath sounds: Normal breath sounds.   Abdominal:      General: Bowel sounds are normal.      Palpations: Abdomen is soft.   Musculoskeletal:         General: Normal range of motion.      Cervical back: Normal range of motion and neck supple.   Skin:     General: Skin is warm and dry.   Neurological:      Mental Status: He is alert and oriented to person, place, and time.   Psychiatric:         Behavior: Behavior normal.         Thought Content: Thought content normal.         Judgment: Judgment normal.         ASSESSMENT/ PLAN:    Diagnoses and all orders for this visit:    1. Hypogonadism in male (Primary)  -     Ambulatory Referral to Urology  -     Comprehensive Metabolic Panel; Future  -     CBC & Differential; Future  -     Testosterone; Future    2. Muscle ache  -     Ambulatory Referral to Urology  -     Comprehensive Metabolic Panel; Future  -     CBC & Differential; Future  -     Testosterone; Future        Orders Placed Today:     No orders of the defined types were placed in this encounter.       Management Plan:   Labs today  Referral to urology for recommendations     An After Visit Summary was printed and given to the patient at discharge.    Follow-up: Return in about 1 week (around 3/16/2022), or labs in AM, for Recheck.    Cristel Figueroa, APRN 4/8/2022 " 11:41 EDT  This note was electronically signed.

## 2022-03-10 ENCOUNTER — CLINICAL SUPPORT (OUTPATIENT)
Dept: FAMILY MEDICINE CLINIC | Facility: CLINIC | Age: 37
End: 2022-03-10

## 2022-03-10 DIAGNOSIS — E29.1 HYPOGONADISM IN MALE: ICD-10-CM

## 2022-03-10 DIAGNOSIS — M79.10 MUSCLE ACHE: ICD-10-CM

## 2022-03-10 PROCEDURE — 36415 COLL VENOUS BLD VENIPUNCTURE: CPT | Performed by: NURSE PRACTITIONER

## 2022-03-10 PROCEDURE — 80053 COMPREHEN METABOLIC PANEL: CPT | Performed by: NURSE PRACTITIONER

## 2022-03-10 PROCEDURE — 84403 ASSAY OF TOTAL TESTOSTERONE: CPT | Performed by: NURSE PRACTITIONER

## 2022-03-10 PROCEDURE — 85025 COMPLETE CBC W/AUTO DIFF WBC: CPT | Performed by: NURSE PRACTITIONER

## 2022-03-10 NOTE — PROGRESS NOTES
Venipuncture Blood Specimen Collection  Venipuncture performed in R arm by James Oconnell CMA with good hemostasis. Patient tolerated the procedure well without complications.   03/10/22   James Oconnell CMA

## 2022-03-11 ENCOUNTER — TELEPHONE (OUTPATIENT)
Dept: FAMILY MEDICINE CLINIC | Facility: CLINIC | Age: 37
End: 2022-03-11

## 2022-03-11 LAB
ALBUMIN SERPL-MCNC: 4.2 G/DL (ref 3.5–5.2)
ALBUMIN/GLOB SERPL: 1.4 G/DL
ALP SERPL-CCNC: 79 U/L (ref 39–117)
ALT SERPL W P-5'-P-CCNC: 27 U/L (ref 1–41)
ANION GAP SERPL CALCULATED.3IONS-SCNC: 9.6 MMOL/L (ref 5–15)
AST SERPL-CCNC: 14 U/L (ref 1–40)
BASOPHILS # BLD AUTO: 0.02 10*3/MM3 (ref 0–0.2)
BASOPHILS NFR BLD AUTO: 0.3 % (ref 0–1.5)
BILIRUB SERPL-MCNC: 0.5 MG/DL (ref 0–1.2)
BUN SERPL-MCNC: 13 MG/DL (ref 6–20)
BUN/CREAT SERPL: 15.3 (ref 7–25)
CALCIUM SPEC-SCNC: 9.6 MG/DL (ref 8.6–10.5)
CHLORIDE SERPL-SCNC: 99 MMOL/L (ref 98–107)
CO2 SERPL-SCNC: 30.4 MMOL/L (ref 22–29)
CREAT SERPL-MCNC: 0.85 MG/DL (ref 0.76–1.27)
DEPRECATED RDW RBC AUTO: 40 FL (ref 37–54)
EGFRCR SERPLBLD CKD-EPI 2021: 114.8 ML/MIN/1.73
EOSINOPHIL # BLD AUTO: 0.1 10*3/MM3 (ref 0–0.4)
EOSINOPHIL NFR BLD AUTO: 1.7 % (ref 0.3–6.2)
ERYTHROCYTE [DISTWIDTH] IN BLOOD BY AUTOMATED COUNT: 13.8 % (ref 12.3–15.4)
GLOBULIN UR ELPH-MCNC: 3 GM/DL
GLUCOSE SERPL-MCNC: 99 MG/DL (ref 65–99)
HCT VFR BLD AUTO: 48.4 % (ref 37.5–51)
HGB BLD-MCNC: 16.1 G/DL (ref 13–17.7)
IMM GRANULOCYTES # BLD AUTO: 0.08 10*3/MM3 (ref 0–0.05)
IMM GRANULOCYTES NFR BLD AUTO: 1.4 % (ref 0–0.5)
LYMPHOCYTES # BLD AUTO: 1.55 10*3/MM3 (ref 0.7–3.1)
LYMPHOCYTES NFR BLD AUTO: 26.6 % (ref 19.6–45.3)
MCH RBC QN AUTO: 26.7 PG (ref 26.6–33)
MCHC RBC AUTO-ENTMCNC: 33.3 G/DL (ref 31.5–35.7)
MCV RBC AUTO: 80.3 FL (ref 79–97)
MONOCYTES # BLD AUTO: 0.44 10*3/MM3 (ref 0.1–0.9)
MONOCYTES NFR BLD AUTO: 7.6 % (ref 5–12)
NEUTROPHILS NFR BLD AUTO: 3.63 10*3/MM3 (ref 1.7–7)
NEUTROPHILS NFR BLD AUTO: 62.4 % (ref 42.7–76)
NRBC BLD AUTO-RTO: 0 /100 WBC (ref 0–0.2)
PLATELET # BLD AUTO: 234 10*3/MM3 (ref 140–450)
PMV BLD AUTO: 9.9 FL (ref 6–12)
POTASSIUM SERPL-SCNC: 4.8 MMOL/L (ref 3.5–5.2)
PROT SERPL-MCNC: 7.2 G/DL (ref 6–8.5)
RBC # BLD AUTO: 6.03 10*6/MM3 (ref 4.14–5.8)
SODIUM SERPL-SCNC: 139 MMOL/L (ref 136–145)
TESTOST SERPL-MCNC: 408 NG/DL (ref 249–836)
WBC NRBC COR # BLD: 5.82 10*3/MM3 (ref 3.4–10.8)

## 2022-03-15 DIAGNOSIS — E29.1 HYPOGONADISM IN MALE: ICD-10-CM

## 2022-03-15 RX ORDER — TESTOSTERONE CYPIONATE 200 MG/ML
200 VIAL (ML) INTRAMUSCULAR
Qty: 2 ML | Refills: 1 | Status: SHIPPED | OUTPATIENT
Start: 2022-03-15 | End: 2022-04-12 | Stop reason: SDUPTHER

## 2022-03-15 RX ORDER — ALLOPURINOL 100 MG/1
100 TABLET ORAL DAILY
Qty: 90 TABLET | Refills: 0 | Status: SHIPPED | OUTPATIENT
Start: 2022-03-15 | End: 2022-09-21 | Stop reason: SDUPTHER

## 2022-03-15 RX ORDER — ALBUTEROL SULFATE 90 UG/1
2 AEROSOL, METERED RESPIRATORY (INHALATION) EVERY 4 HOURS PRN
Status: CANCELLED | OUTPATIENT
Start: 2022-03-15

## 2022-04-12 DIAGNOSIS — E29.1 HYPOGONADISM IN MALE: ICD-10-CM

## 2022-04-12 NOTE — TELEPHONE ENCOUNTER
Caller: Stephon Martini    Relationship: Self    Best call back number: [phone]    Requested Prescriptions:   Requested Prescriptions     Pending Prescriptions Disp Refills   • Testosterone Cypionate 200 MG/ML solution 2 mL 1     Sig: Inject 1 mL as directed Every 14 (Fourteen) Days.        Pharmacy where request should be sent: [facility] [address] - [phone]  - [phone] FX     Additional details provided by patient: IS DUE FOR THIS     Does the patient have less than a 3 day supply:  [x] Yes  [] No    Florin Hinton Rep   04/12/22 12:55 EDT

## 2022-04-13 RX ORDER — TESTOSTERONE CYPIONATE 200 MG/ML
200 VIAL (ML) INTRAMUSCULAR
Qty: 2 ML | Refills: 1 | Status: SHIPPED | OUTPATIENT
Start: 2022-04-13 | End: 2022-06-02 | Stop reason: ALTCHOICE

## 2022-04-13 NOTE — TELEPHONE ENCOUNTER
Rx Refill Note  Requested Prescriptions     Pending Prescriptions Disp Refills   • Testosterone Cypionate 200 MG/ML solution 2 mL 1     Sig: Inject 1 mL as directed Every 14 (Fourteen) Days.      Last office visit with prescribing clinician: 3/9/2022      Next office visit with prescribing clinician: NONE     Testosterone (03/10/2022 08:51)  CBC & Differential (03/10/2022 08:51)  Comprehensive Metabolic Panel (03/10/2022 08:51)  CONTROLLED SUBSTANCE AGREEMENT - SCAN - 2021 CONTROLLED SUBSTANCE AGREEMENT/ [facility]/ 10- (10/12/2021)  MATTHEW - SCAN - INSPECT/ [facility]/ 4.13.2022 (04/13/2022)         Anila Kat LPN  04/13/22, 14:20 EDT

## 2022-04-19 DIAGNOSIS — E29.1 HYPOGONADISM IN MALE: ICD-10-CM

## 2022-04-19 RX ORDER — TESTOSTERONE CYPIONATE 200 MG/ML
200 VIAL (ML) INTRAMUSCULAR
Qty: 2 ML | Refills: 1 | OUTPATIENT
Start: 2022-04-19

## 2022-04-19 NOTE — TELEPHONE ENCOUNTER
S/W [facility] @ [facility]. PER [facility], PATIENT JUST PICKED UP THIS RX APPROX 20 MINUTES AGO, AND THAT THEY DID RECEIVE IT ON 4.13.22. [facility] STATES THAT WHEN PT CALLED THE FIRST TIME THAT IT WAS TOO SOON TO FILL, AND THAT THEY DID RECEIVE RX. PT NOTIFIED OF THE ABOVE. HE VOICED UNDERSTANDING.

## 2022-04-19 NOTE — TELEPHONE ENCOUNTER
Caller: Stephon Matrini    Relationship: Self    Best call back number: [phone]    Requested Prescriptions:   Requested Prescriptions     Pending Prescriptions Disp Refills   • Testosterone Cypionate 200 MG/ML solution 2 mL 1     Sig: Inject 1 mL as directed Every 14 (Fourteen) Days.        Pharmacy where request should be sent: [facility] [address] [phone]  - [phone] FX     Additional details provided by patient: PHARMACY ADVISED PATIENT THEY HAD NOT RECEIVED PRESCRIPTION. PLEASE RE-SEND     Does the patient have less than a 3 day supply:  [] Yes  [] No    Florin Chin Rep   04/19/22 12:03 EDT

## 2022-05-23 ENCOUNTER — OFFICE VISIT (OUTPATIENT)
Dept: FAMILY MEDICINE CLINIC | Facility: CLINIC | Age: 37
End: 2022-05-23

## 2022-05-23 VITALS
BODY MASS INDEX: 42.66 KG/M2 | SYSTOLIC BLOOD PRESSURE: 146 MMHG | TEMPERATURE: 98.2 F | WEIGHT: 315 LBS | HEIGHT: 72 IN | OXYGEN SATURATION: 100 % | RESPIRATION RATE: 18 BRPM | HEART RATE: 93 BPM | DIASTOLIC BLOOD PRESSURE: 84 MMHG

## 2022-05-23 DIAGNOSIS — Z79.899 MEDICATION MANAGEMENT: ICD-10-CM

## 2022-05-23 DIAGNOSIS — R30.0 DYSURIA: ICD-10-CM

## 2022-05-23 DIAGNOSIS — E55.9 VITAMIN D DEFICIENCY: ICD-10-CM

## 2022-05-23 DIAGNOSIS — R73.9 ELEVATED BLOOD SUGAR: ICD-10-CM

## 2022-05-23 DIAGNOSIS — R73.03 PREDIABETES: ICD-10-CM

## 2022-05-23 DIAGNOSIS — E78.00 HYPERCHOLESTEREMIA: ICD-10-CM

## 2022-05-23 DIAGNOSIS — M10.9 ACUTE GOUT INVOLVING TOE OF RIGHT FOOT, UNSPECIFIED CAUSE: ICD-10-CM

## 2022-05-23 DIAGNOSIS — I10 ESSENTIAL HYPERTENSION: ICD-10-CM

## 2022-05-23 DIAGNOSIS — M79.10 MUSCLE ACHE: Primary | ICD-10-CM

## 2022-05-23 DIAGNOSIS — Z11.3 SCREEN FOR SEXUALLY TRANSMITTED DISEASES: ICD-10-CM

## 2022-05-23 DIAGNOSIS — E78.2 MIXED HYPERLIPIDEMIA: ICD-10-CM

## 2022-05-23 PROCEDURE — 87591 N.GONORRHOEAE DNA AMP PROB: CPT | Performed by: REGISTERED NURSE

## 2022-05-23 PROCEDURE — 87661 TRICHOMONAS VAGINALIS AMPLIF: CPT | Performed by: REGISTERED NURSE

## 2022-05-23 PROCEDURE — 87491 CHLMYD TRACH DNA AMP PROBE: CPT | Performed by: REGISTERED NURSE

## 2022-05-23 PROCEDURE — 99214 OFFICE O/P EST MOD 30 MIN: CPT | Performed by: REGISTERED NURSE

## 2022-05-23 RX ORDER — PRAVASTATIN SODIUM 20 MG
20 TABLET ORAL DAILY
Qty: 90 TABLET | Refills: 1 | Status: SHIPPED | OUTPATIENT
Start: 2022-05-23 | End: 2022-09-13 | Stop reason: SDUPTHER

## 2022-05-23 RX ORDER — PREDNISONE 50 MG/1
50 TABLET ORAL DAILY
Qty: 5 TABLET | Refills: 0 | Status: SHIPPED | OUTPATIENT
Start: 2022-05-23 | End: 2022-07-01

## 2022-05-23 NOTE — PROGRESS NOTES
Chief Complaint  Gout and Leg Pain    Subjective    History of Present Illness {CC  Problem List  Visit  Diagnosis   Encounters  Notes  Medications  Labs  Result Review Imaging  Media :23}     Stephon Martini presents to [facility] for Gout and Leg Pain.    Patient is a 36-year-old male who presents to the clinic today to establish care, and has concerns for right great toe pain x1 month and bilateral leg pain x3 months.  Patient denies chest pain, shortness of breath, or any fevers.  Patient denies any exposure to COVID, flu, or any other contagious illnesses.    Regarding bilateral leg pain, patient shares that he has been experiencing muscle aching in his bilateral thighs and calves for 3 months.  He shares that he is currently taking a statin.  Patient denies any known trauma.  Patient denies any increase in activity.  Patient has had previous blood work to rule this out.  We discussed switching his cholesterol medication to pravastatin to trial a reduction in muscle pain.     Regarding right great toe pain, patient shares that he has gout in his right great toe.  He shares that he previously had elevated uric acid levels.  He has not had a relief of symptoms yet.  He would like to know what else can be done for him today.  He is agreeable to a course of steroids for short burst treatment.     Review of Systems   Constitutional: Negative.  Negative for activity change, chills, fatigue and fever.   HENT: Negative.  Negative for congestion, dental problem, ear pain, hearing loss, rhinorrhea, sinus pain, sore throat, tinnitus and trouble swallowing.    Eyes: Negative.  Negative for pain and visual disturbance.   Respiratory: Negative.  Negative for cough, chest tightness, shortness of breath and wheezing.    Cardiovascular: Negative.  Negative for chest pain, palpitations and leg swelling.   Gastrointestinal: Negative.  Negative for abdominal pain, diarrhea, nausea and  "vomiting.   Endocrine: Negative.  Negative for polydipsia, polyphagia and polyuria.   Genitourinary: Positive for dysuria (After sex). Negative for difficulty urinating, frequency and urgency.   Musculoskeletal: Negative.  Negative for arthralgias, back pain and myalgias.        Right great toe   Skin: Negative.  Negative for color change, pallor, rash and wound.   Allergic/Immunologic: Negative.  Negative for environmental allergies.   Neurological: Negative.  Negative for dizziness, speech difficulty, weakness, light-headedness, numbness and headaches.   Hematological: Negative.    Psychiatric/Behavioral: Negative.  Negative for confusion, decreased concentration, self-injury and suicidal ideas. The patient is not nervous/anxious.    All other systems reviewed and are negative.       Objective     Vital Signs:   /84   Pulse 93   Temp 98.2 °F (36.8 °C)   Resp 18   Ht 182.9 cm (72\")   Wt (!) 148 kg (326 lb)   SpO2 100%   BMI 44.21 kg/m²     Past Medical History:   Diagnosis Date   • Anxiety    • Asthma    • Carpal tunnel syndrome, bilateral    • COVID-19    • Hyperlipidemia    • Hypertension    • Kidney stone    • Low testosterone in male    • Obesity    • FANTA (obstructive sleep apnea)       History reviewed. No pertinent surgical history.   Family History   Problem Relation Age of Onset   • Heart attack Mother    • Hyperlipidemia Father    • Hypertension Father    • Stroke Father    • Hyperthyroidism Sister    • Parkinsonism Maternal Grandfather    • Breast cancer Paternal Grandmother    • Stroke Paternal Grandfather    • Nephrolithiasis Paternal Grandfather       Clinical Support on 03/10/2022   Component Date Value Ref Range Status   • Glucose 03/10/2022 99  65 - 99 mg/dL Final   • BUN 03/10/2022 13  6 - 20 mg/dL Final   • Creatinine 03/10/2022 0.85  0.76 - 1.27 mg/dL Final   • Sodium 03/10/2022 139  136 - 145 mmol/L Final   • Potassium 03/10/2022 4.8  3.5 - 5.2 mmol/L Final   • Chloride 03/10/2022 " 99  98 - 107 mmol/L Final   • CO2 03/10/2022 30.4 (A) 22.0 - 29.0 mmol/L Final   • Calcium 03/10/2022 9.6  8.6 - 10.5 mg/dL Final   • Total Protein 03/10/2022 7.2  6.0 - 8.5 g/dL Final   • Albumin 03/10/2022 4.20  3.50 - 5.20 g/dL Final   • ALT (SGPT) 03/10/2022 27  1 - 41 U/L Final   • AST (SGOT) 03/10/2022 14  1 - 40 U/L Final   • Alkaline Phosphatase 03/10/2022 79  39 - 117 U/L Final   • Total Bilirubin 03/10/2022 0.5  0.0 - 1.2 mg/dL Final   • Globulin 03/10/2022 3.0  gm/dL Final   • A/G Ratio 03/10/2022 1.4  g/dL Final   • BUN/Creatinine Ratio 03/10/2022 15.3  7.0 - 25.0 Final   • Anion Gap 03/10/2022 9.6  5.0 - 15.0 mmol/L Final   • eGFR 03/10/2022 114.8  >60.0 mL/min/1.73 Final    National Kidney Foundation and American Society of Nephrology (ASN) Task Force recommended calculation based on the Chronic Kidney Disease Epidemiology Collaboration (CKD-EPI) equation refit without adjustment for race.   • Testosterone, Total 03/10/2022 408.00  249.00 - 836.00 ng/dL Final   • WBC 03/10/2022 5.82  3.40 - 10.80 10*3/mm3 Final   • RBC 03/10/2022 6.03 (A) 4.14 - 5.80 10*6/mm3 Final   • Hemoglobin 03/10/2022 16.1  13.0 - 17.7 g/dL Final   • Hematocrit 03/10/2022 48.4  37.5 - 51.0 % Final   • MCV 03/10/2022 80.3  79.0 - 97.0 fL Final   • MCH 03/10/2022 26.7  26.6 - 33.0 pg Final   • MCHC 03/10/2022 33.3  31.5 - 35.7 g/dL Final   • RDW 03/10/2022 13.8  12.3 - 15.4 % Final   • RDW-SD 03/10/2022 40.0  37.0 - 54.0 fl Final   • MPV 03/10/2022 9.9  6.0 - 12.0 fL Final   • Platelets 03/10/2022 234  140 - 450 10*3/mm3 Final   • Neutrophil % 03/10/2022 62.4  42.7 - 76.0 % Final   • Lymphocyte % 03/10/2022 26.6  19.6 - 45.3 % Final   • Monocyte % 03/10/2022 7.6  5.0 - 12.0 % Final   • Eosinophil % 03/10/2022 1.7  0.3 - 6.2 % Final   • Basophil % 03/10/2022 0.3  0.0 - 1.5 % Final   • Immature Grans % 03/10/2022 1.4 (A) 0.0 - 0.5 % Final   • Neutrophils, Absolute 03/10/2022 3.63  1.70 - 7.00 10*3/mm3 Final   • Lymphocytes, Absolute  03/10/2022 1.55  0.70 - 3.10 10*3/mm3 Final   • Monocytes, Absolute 03/10/2022 0.44  0.10 - 0.90 10*3/mm3 Final   • Eosinophils, Absolute 03/10/2022 0.10  0.00 - 0.40 10*3/mm3 Final   • Basophils, Absolute 03/10/2022 0.02  0.00 - 0.20 10*3/mm3 Final   • Immature Grans, Absolute 03/10/2022 0.08 (A) 0.00 - 0.05 10*3/mm3 Final   • nRBC 03/10/2022 0.0  0.0 - 0.2 /100 WBC Final   Office Visit on 03/02/2022   Component Date Value Ref Range Status   • Magnesium 03/02/2022 2.1  1.6 - 2.6 mg/dL Final   • Uric Acid 03/02/2022 8.2 (A) 3.4 - 7.0 mg/dL Final   • Rheumatoid Factor Quantitative 03/02/2022 <10.0  0.0 - 14.0 IU/mL Final   • C-Reactive Protein 03/02/2022 <0.30  0.00 - 0.50 mg/dL Final   • Antinuclear Antibodies (DENNY) 03/02/2022 Negative  Negative Final   • Creatine Kinase 03/02/2022 131  20 - 200 U/L Final   • WBC 03/02/2022 7.40  3.40 - 10.80 10*3/mm3 Final   • RBC 03/02/2022 6.06 (A) 4.14 - 5.80 10*6/mm3 Final   • Hemoglobin 03/02/2022 16.2  13.0 - 17.7 g/dL Final   • Hematocrit 03/02/2022 50.2  37.5 - 51.0 % Final   • MCV 03/02/2022 82.8  79.0 - 97.0 fL Final   • MCH 03/02/2022 26.7  26.6 - 33.0 pg Final   • MCHC 03/02/2022 32.3  31.5 - 35.7 g/dL Final   • RDW 03/02/2022 14.0  12.3 - 15.4 % Final   • RDW-SD 03/02/2022 41.6  37.0 - 54.0 fl Final   • MPV 03/02/2022 10.1  6.0 - 12.0 fL Final   • Platelets 03/02/2022 264  140 - 450 10*3/mm3 Final   • Neutrophil % 03/02/2022 70.1  42.7 - 76.0 % Final   • Lymphocyte % 03/02/2022 20.9  19.6 - 45.3 % Final   • Monocyte % 03/02/2022 6.4  5.0 - 12.0 % Final   • Eosinophil % 03/02/2022 1.5  0.3 - 6.2 % Final   • Basophil % 03/02/2022 0.4  0.0 - 1.5 % Final   • Immature Grans % 03/02/2022 0.7 (A) 0.0 - 0.5 % Final   • Neutrophils, Absolute 03/02/2022 5.19  1.70 - 7.00 10*3/mm3 Final   • Lymphocytes, Absolute 03/02/2022 1.55  0.70 - 3.10 10*3/mm3 Final   • Monocytes, Absolute 03/02/2022 0.47  0.10 - 0.90 10*3/mm3 Final   • Eosinophils, Absolute 03/02/2022 0.11  0.00 - 0.40  10*3/mm3 Final   • Basophils, Absolute 03/02/2022 0.03  0.00 - 0.20 10*3/mm3 Final   • Immature Grans, Absolute 03/02/2022 0.05  0.00 - 0.05 10*3/mm3 Final   • nRBC 03/02/2022 0.0  0.0 - 0.2 /100 WBC Final       Physical Exam  Vitals and nursing note reviewed.   Constitutional:       Appearance: Normal appearance. He is normal weight.   HENT:      Head: Normocephalic and atraumatic.   Cardiovascular:      Rate and Rhythm: Normal rate and regular rhythm.      Pulses: Normal pulses.      Heart sounds: Normal heart sounds. No murmur heard.    No friction rub. No gallop.   Pulmonary:      Effort: Pulmonary effort is normal. No respiratory distress.      Breath sounds: Normal breath sounds. No stridor. No wheezing, rhonchi or rales.   Chest:      Chest wall: No tenderness.   Abdominal:      General: Abdomen is flat. Bowel sounds are normal. There is no distension.      Palpations: Abdomen is soft. There is no mass.      Tenderness: There is no abdominal tenderness. There is no right CVA tenderness, left CVA tenderness, guarding or rebound.      Hernia: No hernia is present.   Musculoskeletal:        Feet:    Feet:      Right foot:      Skin integrity: Warmth present.      Comments: Right great toe pain  Skin:     General: Skin is warm and dry.      Capillary Refill: Capillary refill takes less than 2 seconds.      Coloration: Skin is not jaundiced or pale.   Neurological:      General: No focal deficit present.      Mental Status: He is alert and oriented to person, place, and time. Mental status is at baseline.      Motor: No weakness.      Coordination: Coordination normal.      Gait: Gait normal.   Psychiatric:         Mood and Affect: Mood normal.         Behavior: Behavior normal.         Thought Content: Thought content normal.         Judgment: Judgment normal.          Result Review  Data Reviewed:{ Labs  Result Review  Imaging  Med Tab  Media :23}   I have reviewed this patient's chart.  I have reviewed  previous blood work, previous medication, previous imaging, and previous encounters.           Assessment and Plan {CC Problem List  Visit Diagnosis  ROS  Review (Popup)  [facility]  BestPractice  Medications  SmartSets  SnapShot Encounters  Media :23}   Diagnoses and all orders for this visit:    1. Muscle ache (Primary)  -     Lipid panel; Future    2. Dysuria  -     Chlamydia trachomatis, Neisseria gonorrhoeae, Trichomonas vaginalis, PCR - Urine, Urine, Clean Catch; Future  -     Chlamydia trachomatis, Neisseria gonorrhoeae, Trichomonas vaginalis, PCR - Urine, Urine, Clean Catch    3. Acute gout involving toe of right foot, unspecified cause  -     predniSONE (DELTASONE) 50 MG tablet; Take 1 tablet by mouth Daily.  Dispense: 5 tablet; Refill: 0    4. Hypercholesteremia  -     pravastatin (Pravachol) 20 MG tablet; Take 1 tablet by mouth Daily.  Dispense: 90 tablet; Refill: 1  -     Lipid panel; Future    5. Screen for sexually transmitted diseases  -     Chlamydia trachomatis, Neisseria gonorrhoeae, Trichomonas vaginalis, PCR - Urine, Urine, Clean Catch; Future  -     Chlamydia trachomatis, Neisseria gonorrhoeae, Trichomonas vaginalis, PCR - Urine, Urine, Clean Catch    6. Medication management  -     Comprehensive metabolic panel; Future  -     Lipid panel; Future  -     Hemoglobin A1c; Future  -     Vitamin D 25 hydroxy; Future  -     CBC w AUTO Differential; Future    7. Mixed hyperlipidemia  -     Lipid panel; Future    8. Essential hypertension  -     Comprehensive metabolic panel; Future  -     Lipid panel; Future  -     CBC w AUTO Differential; Future    9. Elevated blood sugar  -     Hemoglobin A1c; Future    10. Prediabetes  -     Hemoglobin A1c; Future    11. Vitamin D deficiency  -     Vitamin D 25 hydroxy; Future    -Discontinue atorvastatin and begin pravastatin to see if there is a reduction in muscle pain statin.  Advised patient to call us and let us know how he does.   There are other options we can explore if we need to.  -GCT urine today.  Patient shares that he and his wife were strange for period of time and he would like to make sure that he does not have any STDs.  Patient shares that it does burn while he urinates now.  -Prednisone burst dose for gout treatment.  Follow-up if not improved after treatment.  -Follow-up in 3 months or sooner if needed.  Labs prior to next visit including a CMP, vitamin D, hemoglobin A1c, fasting lipid panel, and a CBC.    I spent 30 minutes caring for Stephon on this date of service. This time includes time spent by me in the following activities:preparing for the visit, reviewing tests, obtaining and/or reviewing a separately obtained history, performing a medically appropriate examination and/or evaluation , counseling and educating the patient/family/caregiver, ordering medications, tests, or procedures, documenting information in the medical record, independently interpreting results and communicating that information with the patient/family/caregiver and care coordination.    Follow Up {Instructions Charge Capture  Follow-up Communications :23}     Patient was given instructions and counseling regarding his condition or for health maintenance advice. Please see specific information pulled into the AVS (placed there by myself) if appropriate.    Return in about 3 months (around 8/23/2022).    MDM  Number of Diagnoses or Management Options  Acute gout involving toe of right foot, unspecified cause: established, worsening  Dysuria: established, worsening  Elevated blood sugar: established, improving  Essential hypertension: established, improving  Hypercholesteremia: established, improving  Medication management: established, improving  Mixed hyperlipidemia: established, improving  Muscle ache: established, worsening  Prediabetes: established, improving  Screen for sexually transmitted diseases: established, worsening     Amount  "and/or Complexity of Data Reviewed  Clinical lab tests: ordered and reviewed  Tests in the radiology section of CPT®: reviewed  Tests in the medicine section of CPT®: reviewed  Discussion of test results with the performing providers: no  Decide to obtain previous medical records or to obtain history from someone other than the patient: no  Obtain history from someone other than the patient: no  Review and summarize past medical records: yes  Discuss the patient with other providers: no  Independent visualization of images, tracings, or specimens: no    Risk of Complications, Morbidity, and/or Mortality  Presenting problems: high  Diagnostic procedures: low  Management options: high    Critical Care  Total time providing critical care: 30-74 minutes    Patient Progress  Patient progress: stable       LINDA Brandt, FNP-BC        I have updated the patient's coding to 34[address] at the recommendation of the billing  which stated \"Clinical indicators that support the appropriate E&M code level of 62341 are: Prescriptions were managed & three tests were ordered.\"    " irregular menses

## 2022-05-25 ENCOUNTER — TELEPHONE (OUTPATIENT)
Dept: FAMILY MEDICINE CLINIC | Facility: CLINIC | Age: 37
End: 2022-05-25

## 2022-05-25 DIAGNOSIS — F41.9 ANXIETY: ICD-10-CM

## 2022-05-25 LAB
C TRACH RRNA SPEC QL NAA+PROBE: NEGATIVE
N GONORRHOEA RRNA SPEC QL NAA+PROBE: NEGATIVE
T VAGINALIS RRNA SPEC QL NAA+PROBE: NEGATIVE

## 2022-05-25 RX ORDER — CLONAZEPAM 0.25 MG/1
0.25 TABLET, ORALLY DISINTEGRATING ORAL 2 TIMES DAILY PRN
Qty: 14 TABLET | Refills: 0 | Status: SHIPPED | OUTPATIENT
Start: 2022-05-25 | End: 2022-05-27 | Stop reason: SDUPTHER

## 2022-05-25 NOTE — TELEPHONE ENCOUNTER
Caller: Stephon Martini    Relationship: Self    Best call back number:  [phone]     Requested Prescriptions:   Requested Prescriptions     Pending Prescriptions Disp Refills   • clonazePAM (KlonoPIN) 0.25 MG disintegrating tablet 60 tablet 0     Sig: Place 1 tablet on the tongue 2 (Two) Times a Day As Needed for Anxiety.        Pharmacy where request should be sent: [facility] [address] - [phone]  - [phone] FX     Additional details provided by patient: PATIENT HAS 3 LEFT.    Does the patient have less than a 3 day supply:  [x] Yes  [] No    Florin BENITEZ Rep   05/25/22 10:09 EDT

## 2022-05-25 NOTE — TELEPHONE ENCOUNTER
Rx Refill Note  Requested Prescriptions     Pending Prescriptions Disp Refills   • clonazePAM (KlonoPIN) 0.25 MG disintegrating tablet 60 tablet 0     Sig: Place 1 tablet on the tongue 2 (Two) Times a Day As Needed for Anxiety.      Last office visit with prescribing clinician: 5/23/2022      Next office visit with prescribing clinician: Visit date not found       MATTHEW - SCAN - INSPECT/ [facility]/ 5.25.2022 (05/25/2022)    CONTROLLED SUBSTANCE AGREEMENT - SCAN - 2021 CONTROLLED SUBSTANCE AGREEMENT/ [facility]/ 10- (10/12/2021)  (NOT UPDATED WITH YOU YET)      POC Urine Drug Screen, Triage (02/17/2022 15:35)         Anila Kat LPN  05/25/22, 11:05 EDT

## 2022-05-25 NOTE — PROGRESS NOTES
Can you please call and inform the patient of the following results:    His trichomoniasis was negative.    Chlamydia was negative.    Gonorrhea was negative.    Thank you.

## 2022-05-25 NOTE — TELEPHONE ENCOUNTER
I can send in a 1 week supply for him at this time.  After patient is able to come by the clinic and sign a narcotic agreement, I can happily send him a month at a time.  I have called him to let him know this and he will come by on Monday he shares to sign the agreement.

## 2022-05-25 NOTE — TELEPHONE ENCOUNTER
----- Message from LINDA Brandt sent at 5/25/2022  8:08 AM EDT -----  Can you please call and inform the patient of the following results:    His trichomoniasis was negative.    Chlamydia was negative.    Gonorrhea was negative.    Thank you.

## 2022-05-27 ENCOUNTER — TELEPHONE (OUTPATIENT)
Dept: FAMILY MEDICINE CLINIC | Facility: CLINIC | Age: 37
End: 2022-05-27

## 2022-05-27 DIAGNOSIS — F41.9 ANXIETY: ICD-10-CM

## 2022-05-27 DIAGNOSIS — I10 ESSENTIAL HYPERTENSION: Primary | ICD-10-CM

## 2022-05-27 RX ORDER — CLONAZEPAM 0.25 MG/1
0.25 TABLET, ORALLY DISINTEGRATING ORAL 2 TIMES DAILY PRN
Qty: 60 TABLET | Refills: 0 | Status: SHIPPED | OUTPATIENT
Start: 2022-05-27 | End: 2022-09-21 | Stop reason: SDUPTHER

## 2022-05-27 RX ORDER — TRIAMTERENE AND HYDROCHLOROTHIAZIDE 37.5; 25 MG/1; MG/1
1 TABLET ORAL DAILY
Qty: 90 TABLET | Refills: 1 | Status: SHIPPED | OUTPATIENT
Start: 2022-05-27 | End: 2023-02-21

## 2022-05-27 RX ORDER — LISINOPRIL 20 MG/1
20 TABLET ORAL DAILY
Qty: 90 TABLET | Refills: 0 | Status: SHIPPED | OUTPATIENT
Start: 2022-05-27 | End: 2022-08-29 | Stop reason: SDUPTHER

## 2022-05-27 NOTE — TELEPHONE ENCOUNTER
Caller: Stephon Martini    Relationship to patient: Self    Best call back number:[phone]    Patient is needing: PATIENT IS NEEDING TO SIGN A CONTROLLED SUBSTANCE AGREEMENT AND WANTS TO KNOW WHEN HE CAN COME IN TO DO THAT.

## 2022-06-02 RX ORDER — TESTOSTERONE CYPIONATE 200 MG/ML
200 INJECTION, SOLUTION INTRAMUSCULAR
Status: DISCONTINUED | OUTPATIENT
Start: 2022-06-02 | End: 2022-07-26

## 2022-07-01 ENCOUNTER — OFFICE VISIT (OUTPATIENT)
Dept: FAMILY MEDICINE CLINIC | Facility: CLINIC | Age: 37
End: 2022-07-01

## 2022-07-01 VITALS
TEMPERATURE: 98 F | DIASTOLIC BLOOD PRESSURE: 87 MMHG | HEART RATE: 73 BPM | BODY MASS INDEX: 42.66 KG/M2 | WEIGHT: 315 LBS | RESPIRATION RATE: 18 BRPM | HEIGHT: 72 IN | SYSTOLIC BLOOD PRESSURE: 132 MMHG | OXYGEN SATURATION: 98 %

## 2022-07-01 DIAGNOSIS — Z79.899 MEDICATION MANAGEMENT: ICD-10-CM

## 2022-07-01 DIAGNOSIS — R73.03 PREDIABETES: ICD-10-CM

## 2022-07-01 DIAGNOSIS — E55.9 VITAMIN D DEFICIENCY: ICD-10-CM

## 2022-07-01 DIAGNOSIS — E78.00 HYPERCHOLESTEREMIA: ICD-10-CM

## 2022-07-01 DIAGNOSIS — E78.2 MIXED HYPERLIPIDEMIA: ICD-10-CM

## 2022-07-01 DIAGNOSIS — I10 HYPERTENSION, UNSPECIFIED TYPE: ICD-10-CM

## 2022-07-01 DIAGNOSIS — M79.10 MUSCLE ACHE: ICD-10-CM

## 2022-07-01 DIAGNOSIS — R73.9 ELEVATED BLOOD SUGAR: ICD-10-CM

## 2022-07-01 DIAGNOSIS — I10 ESSENTIAL HYPERTENSION: ICD-10-CM

## 2022-07-01 DIAGNOSIS — E66.01 MORBID OBESITY WITH BMI OF 45.0-49.9, ADULT: Primary | ICD-10-CM

## 2022-07-01 DIAGNOSIS — E29.1 HYPOGONADISM IN MALE: ICD-10-CM

## 2022-07-01 PROCEDURE — 99214 OFFICE O/P EST MOD 30 MIN: CPT | Performed by: REGISTERED NURSE

## 2022-07-01 PROCEDURE — 85025 COMPLETE CBC W/AUTO DIFF WBC: CPT | Performed by: REGISTERED NURSE

## 2022-07-01 PROCEDURE — 82306 VITAMIN D 25 HYDROXY: CPT | Performed by: REGISTERED NURSE

## 2022-07-01 PROCEDURE — 80053 COMPREHEN METABOLIC PANEL: CPT | Performed by: REGISTERED NURSE

## 2022-07-01 PROCEDURE — 84403 ASSAY OF TOTAL TESTOSTERONE: CPT | Performed by: REGISTERED NURSE

## 2022-07-01 PROCEDURE — 80061 LIPID PANEL: CPT | Performed by: REGISTERED NURSE

## 2022-07-01 PROCEDURE — 83036 HEMOGLOBIN GLYCOSYLATED A1C: CPT | Performed by: REGISTERED NURSE

## 2022-07-01 RX ORDER — TESTOSTERONE CYPIONATE 200 MG/ML
INJECTION, SOLUTION INTRAMUSCULAR
COMMUNITY
Start: 2022-05-18 | End: 2022-07-26 | Stop reason: SDUPTHER

## 2022-07-01 NOTE — PROGRESS NOTES
Venipuncture Blood Specimen Collection  Venipuncture performed in R arm by JASIEL PINEDA MA with good hemostasis. Patient tolerated the procedure well without complications.   07/01/22   JASIEL PINEDA MA

## 2022-07-01 NOTE — PROGRESS NOTES
Chief Complaint  Weight Gain (Would like some weight loss options. Not opposed to gastric surgeries. Has gained almost 8# since 5.23.22) and Medication Problem (Since starting new cholesterol medication, patient c/o feeling bloating associated with weight gain. )    Subjective    History of Present Illness {CC  Problem List  Visit  Diagnosis   Encounters  Notes  Medications  Labs  Result Review Imaging  Media :23}     Stephon Martini presents to [facility] for Weight Gain (Would like some weight loss options. Not opposed to gastric surgeries. Has gained almost 8# since 5.23.22) and Medication Problem (Since starting new cholesterol medication, patient c/o feeling bloating associated with weight gain. ).    Patient is a 36-year-old male who presents to the clinic today with concerns of continued weight gain over the course of his lifetime.  Patient denies any chest pain, shortness of breath, or any fevers.  Patient denies any known exposure to COVID, flu, or any other contagious illnesses.    Patient shares that he has always been obese.  He shares that he was near 300 pounds while in school and has continued to gain weight over the course of his lifetime.  Patient shares that he has wanted to do bariatric surgery for several years but has not been able to have it covered with insurance.  Patient now has coverage for this under his current insurance and would like to pursue getting bariatric surgery.  Patient is unsure what his insurance covers for appetite suppression.  He will check and do his prescription coverage and give me a call and I will happily send over order for the prescription.    Testosterone labs ordered at today's visit.  Patient wanted to inform me that he will be getting his testosterone injection later today, last injection was 2 weeks ago.  This will help in deciding on dosage or any adjustments that may be needed.             Review of Systems  "  Constitutional: Positive for unexpected weight change. Negative for activity change, chills, fatigue and fever.   HENT: Negative.  Negative for congestion, dental problem, ear pain, hearing loss, rhinorrhea, sinus pain, sore throat, tinnitus and trouble swallowing.    Eyes: Negative.  Negative for pain and visual disturbance.   Respiratory: Negative.  Negative for cough, chest tightness, shortness of breath and wheezing.    Cardiovascular: Negative.  Negative for chest pain, palpitations and leg swelling.   Gastrointestinal: Negative.  Negative for abdominal pain, diarrhea, nausea and vomiting.   Endocrine: Negative.  Negative for polydipsia, polyphagia and polyuria.   Genitourinary: Negative.  Negative for difficulty urinating, dysuria, frequency and urgency.   Musculoskeletal: Negative.  Negative for arthralgias, back pain and myalgias.   Skin: Negative.  Negative for color change, pallor, rash and wound.   Allergic/Immunologic: Negative.  Negative for environmental allergies.   Neurological: Negative.  Negative for dizziness, speech difficulty, weakness, light-headedness, numbness and headaches.   Hematological: Negative.    Psychiatric/Behavioral: Negative.  Negative for confusion, decreased concentration, self-injury and suicidal ideas. The patient is not nervous/anxious.    All other systems reviewed and are negative.       Objective     Vital Signs:   /87 (BP Location: Left arm, Patient Position: Sitting, Cuff Size: Large Adult)   Pulse 73   Temp 98 °F (36.7 °C) (Infrared)   Resp 18   Ht 182.9 cm (72\")   Wt (!) 152 kg (334 lb 6.4 oz)   SpO2 98%   BMI 45.35 kg/m²     Past Medical History:   Diagnosis Date   • Anxiety    • Asthma    • Carpal tunnel syndrome, bilateral    • COVID-19    • Hyperlipidemia    • Hypertension    • Kidney stone    • Low testosterone in male    • Obesity    • FANTA (obstructive sleep apnea)       History reviewed. No pertinent surgical history.   Family History   Problem " Relation Age of Onset   • Heart attack Mother    • Hyperlipidemia Father    • Hypertension Father    • Stroke Father    • Hyperthyroidism Sister    • Parkinsonism Maternal Grandfather    • Breast cancer Paternal Grandmother    • Stroke Paternal Grandfather    • Nephrolithiasis Paternal Grandfather       Social History     Socioeconomic History   • Marital status:    Tobacco Use   • Smoking status: Former Smoker     Packs/day: 1.00     Years: 5.00     Pack years: 5.00     Types: Cigarettes     Start date:      Quit date:      Years since quittin.5   • Smokeless tobacco: Never Used   Substance and Sexual Activity   • Alcohol use: Not Currently     Comment: HX OF ALCOHOLISM    • Drug use: Never   • Sexual activity: Defer         Office Visit on 2022   Component Date Value Ref Range Status   • Chlamydia trachomatis, PRANAY 2022 Negative  Negative Final   • Gonococcus by PRANAY 2022 Negative  Negative Final   • Trichomonas vaginosis 2022 Negative  Negative Final         Physical Exam  Vitals and nursing note reviewed.   Constitutional:       Appearance: Normal appearance. He is obese.   HENT:      Head: Normocephalic and atraumatic.   Cardiovascular:      Rate and Rhythm: Normal rate and regular rhythm.      Pulses: Normal pulses.      Heart sounds: Normal heart sounds. No murmur heard.    No friction rub. No gallop.   Pulmonary:      Effort: Pulmonary effort is normal. No respiratory distress.      Breath sounds: Normal breath sounds. No stridor. No wheezing, rhonchi or rales.   Chest:      Chest wall: No tenderness.   Abdominal:      General: Abdomen is flat. Bowel sounds are normal. There is no distension.      Palpations: Abdomen is soft. There is no mass.      Tenderness: There is no abdominal tenderness. There is no right CVA tenderness, left CVA tenderness, guarding or rebound.      Hernia: No hernia is present.   Skin:     General: Skin is warm and dry.      Capillary Refill:  Capillary refill takes less than 2 seconds.      Coloration: Skin is not jaundiced or pale.   Neurological:      General: No focal deficit present.      Mental Status: He is alert and oriented to person, place, and time. Mental status is at baseline.      Motor: No weakness.      Coordination: Coordination normal.      Gait: Gait normal.   Psychiatric:         Mood and Affect: Mood normal.         Behavior: Behavior normal.         Thought Content: Thought content normal.         Judgment: Judgment normal.          Result Review  Data Reviewed:{ Labs  Result Review  Imaging  Med Tab  Media :23}   I have reviewed this patient's chart.  I reviewed previous labs, previous imaging, previous medications, previous encounters with notes.           Assessment and Plan {CC Problem List  Visit Diagnosis  ROS  Review (Popup)  [facility]  Quality  BestPractice  Medications  SmartSets  SnapShot Encounters  Media :23}   Diagnoses and all orders for this visit:    1. Morbid obesity with BMI of 45.0-49.9, adult (HCC) (Primary)  -     Ambulatory Referral to Bariatric Surgery    2. Hypertension, unspecified type  -     Ambulatory Referral to Bariatric Surgery    3. Hypercholesteremia  -     Ambulatory Referral to Bariatric Surgery  -     Lipid panel    4. Prediabetes  -     Ambulatory Referral to Bariatric Surgery  -     Hemoglobin A1c    5. Mixed hyperlipidemia  -     Ambulatory Referral to Bariatric Surgery  -     Lipid panel    6. Hypogonadism in male  -     Testosterone    7. Medication management  -     Comprehensive metabolic panel  -     Lipid panel  -     Hemoglobin A1c  -     Vitamin D 25 hydroxy  -     CBC w AUTO Differential    8. Essential hypertension  -     Comprehensive metabolic panel  -     Lipid panel  -     CBC w AUTO Differential    9. Muscle ache  -     Lipid panel    10. Elevated blood sugar  -     Hemoglobin A1c    11. Vitamin D deficiency  -     Vitamin D 25 hydroxy      -Fasting labs  at today's visit.  -Referral made to bariatric surgery for weight loss management.  -Discussed weight loss drugs.  Patient has tried Saxenda in the past.  He will look and see what is covered by his insurance company and will let me know and I will happily order something for him to help with appetite suppression.  -Follow-up in 3 months at next scheduled routine visit.    I spent 30 minutes caring for Stephon on this date of service. This time includes time spent by me in the following activities:preparing for the visit, reviewing tests, obtaining and/or reviewing a separately obtained history, performing a medically appropriate examination and/or evaluation , counseling and educating the patient/family/caregiver, ordering medications, tests, or procedures, referring and communicating with other health care professionals , documenting information in the medical record, independently interpreting results and communicating that information with the patient/family/caregiver and care coordination  Follow Up {Instructions Charge Capture  Follow-up Communications :23}     Patient was given instructions and counseling regarding his condition or for health maintenance advice. Please see specific information pulled into the AVS (placed there by myself) if appropriate.    No follow-ups on file.    MDM  Number of Diagnoses or Management Options  Elevated blood sugar: established, worsening  Essential hypertension: established, improving  Hypercholesteremia: established, improving  Hypertension, unspecified type: established, improving  Hypogonadism in male: established, improving  Medication management: established, worsening  Mixed hyperlipidemia: established, improving  Morbid obesity with BMI of 45.0-49.9, adult (HCC): established, worsening  Muscle ache: established, worsening  Prediabetes: established, improving     Amount and/or Complexity of Data Reviewed  Clinical lab tests: ordered and reviewed  Tests in the  radiology section of CPT®: reviewed  Tests in the medicine section of CPT®: reviewed  Discussion of test results with the performing providers: no  Decide to obtain previous medical records or to obtain history from someone other than the patient: no  Obtain history from someone other than the patient: no  Review and summarize past medical records: yes  Discuss the patient with other providers: no  Independent visualization of images, tracings, or specimens: no    Risk of Complications, Morbidity, and/or Mortality  Presenting problems: high  Diagnostic procedures: low  Management options: high    Critical Care  Total time providing critical care: 30-74 minutes    Patient Progress  Patient progress: stable       LINDA Brandt, FNP-BC

## 2022-07-02 LAB
25(OH)D3 SERPL-MCNC: 34 NG/ML (ref 30–100)
ALBUMIN SERPL-MCNC: 4.3 G/DL (ref 3.5–5.2)
ALBUMIN/GLOB SERPL: 1.7 G/DL
ALP SERPL-CCNC: 64 U/L (ref 39–117)
ALT SERPL W P-5'-P-CCNC: 24 U/L (ref 1–41)
ANION GAP SERPL CALCULATED.3IONS-SCNC: 13.3 MMOL/L (ref 5–15)
AST SERPL-CCNC: 18 U/L (ref 1–40)
BASOPHILS # BLD AUTO: 0.05 10*3/MM3 (ref 0–0.2)
BASOPHILS NFR BLD AUTO: 0.8 % (ref 0–1.5)
BILIRUB SERPL-MCNC: 0.4 MG/DL (ref 0–1.2)
BUN SERPL-MCNC: 13 MG/DL (ref 6–20)
BUN/CREAT SERPL: 15.5 (ref 7–25)
CALCIUM SPEC-SCNC: 9.6 MG/DL (ref 8.6–10.5)
CHLORIDE SERPL-SCNC: 98 MMOL/L (ref 98–107)
CHOLEST SERPL-MCNC: 174 MG/DL (ref 0–200)
CO2 SERPL-SCNC: 25.7 MMOL/L (ref 22–29)
CREAT SERPL-MCNC: 0.84 MG/DL (ref 0.76–1.27)
DEPRECATED RDW RBC AUTO: 38.5 FL (ref 37–54)
EGFRCR SERPLBLD CKD-EPI 2021: 115.2 ML/MIN/1.73
EOSINOPHIL # BLD AUTO: 0.11 10*3/MM3 (ref 0–0.4)
EOSINOPHIL NFR BLD AUTO: 1.8 % (ref 0.3–6.2)
ERYTHROCYTE [DISTWIDTH] IN BLOOD BY AUTOMATED COUNT: 13.7 % (ref 12.3–15.4)
GLOBULIN UR ELPH-MCNC: 2.6 GM/DL
GLUCOSE SERPL-MCNC: 94 MG/DL (ref 65–99)
HCT VFR BLD AUTO: 43.1 % (ref 37.5–51)
HDLC SERPL-MCNC: 37 MG/DL (ref 40–60)
HGB BLD-MCNC: 14.9 G/DL (ref 13–17.7)
IMM GRANULOCYTES # BLD AUTO: 0.29 10*3/MM3 (ref 0–0.05)
IMM GRANULOCYTES NFR BLD AUTO: 4.7 % (ref 0–0.5)
LDLC SERPL CALC-MCNC: 102 MG/DL (ref 0–100)
LDLC/HDLC SERPL: 2.61 {RATIO}
LYMPHOCYTES # BLD AUTO: 1.89 10*3/MM3 (ref 0.7–3.1)
LYMPHOCYTES NFR BLD AUTO: 30.9 % (ref 19.6–45.3)
MCH RBC QN AUTO: 27.3 PG (ref 26.6–33)
MCHC RBC AUTO-ENTMCNC: 34.6 G/DL (ref 31.5–35.7)
MCV RBC AUTO: 79.1 FL (ref 79–97)
MONOCYTES # BLD AUTO: 0.51 10*3/MM3 (ref 0.1–0.9)
MONOCYTES NFR BLD AUTO: 8.3 % (ref 5–12)
NEUTROPHILS NFR BLD AUTO: 3.27 10*3/MM3 (ref 1.7–7)
NEUTROPHILS NFR BLD AUTO: 53.5 % (ref 42.7–76)
NRBC BLD AUTO-RTO: 0 /100 WBC (ref 0–0.2)
PLATELET # BLD AUTO: 226 10*3/MM3 (ref 140–450)
PMV BLD AUTO: 9.8 FL (ref 6–12)
POTASSIUM SERPL-SCNC: 5 MMOL/L (ref 3.5–5.2)
PROT SERPL-MCNC: 6.9 G/DL (ref 6–8.5)
RBC # BLD AUTO: 5.45 10*6/MM3 (ref 4.14–5.8)
SODIUM SERPL-SCNC: 137 MMOL/L (ref 136–145)
TESTOST SERPL-MCNC: 362 NG/DL (ref 249–836)
TRIGL SERPL-MCNC: 203 MG/DL (ref 0–150)
VLDLC SERPL-MCNC: 35 MG/DL (ref 5–40)
WBC NRBC COR # BLD: 6.12 10*3/MM3 (ref 3.4–10.8)

## 2022-07-04 NOTE — PROGRESS NOTES
Can you please inform patient of the following results:    His vit d is improving. He is now on the low side of normal at 34, with a goal of . Continue supplementation as he is doing.    His CMP is perfect.    Lipids are abnormal still. His LDL did improve. His triglycerides have increased and his HDL has went too low. Try to avoid foods high in saturated fat when possible.     His testosterone is perfect at 362.    His CBC is grossly normal.    Thank you.

## 2022-07-05 LAB — HBA1C MFR BLD: 5.9 % (ref 3.5–5.6)

## 2022-07-06 ENCOUNTER — TELEPHONE (OUTPATIENT)
Dept: FAMILY MEDICINE CLINIC | Facility: CLINIC | Age: 37
End: 2022-07-06

## 2022-07-06 DIAGNOSIS — K08.89 PAIN OF TOOTH SOCKET: ICD-10-CM

## 2022-07-06 DIAGNOSIS — R79.89 LOW SERUM TESTOSTERONE: ICD-10-CM

## 2022-07-06 DIAGNOSIS — K08.409 STATUS POST TOOTH EXTRACTION: Primary | ICD-10-CM

## 2022-07-06 DIAGNOSIS — E29.1 HYPOGONADISM IN MALE: ICD-10-CM

## 2022-07-06 NOTE — TELEPHONE ENCOUNTER
Caller: Stephon Martini    Relationship: Self    Best call back number: [phone]    Requested Prescriptions:   Requested Prescriptions      No prescriptions requested or ordered in this encounter        Pharmacy where request should be sent:  [facility] [address] [phone]  - [phone]   [phone]    Additional details provided by patient: PATIENT STATES HE IS OUT OF THE NEEDLES FOR THE TESTOSTERONE SHOTS HE TAKES. HE IS DUE FOR A SHOT.     HE IS ALSO GETTING HIS WISDOM TEETH CUT OUT TODAY AND WANTED TO KNOW IF MARIBEL YU WOULD SEND IN A SINGLE DOSE OR TWO FOR A LORATAB BECAUSE HIS DENTIST TOLD HIM TO GO THROUGH HIS PCP    Does the patient have less than a 3 day supply:  [x] Yes  [] No    Florin WHEELER Rep   07/06/22 08:37 EDT

## 2022-07-06 NOTE — TELEPHONE ENCOUNTER
MARIBEL,   PLEASE ADVISE. I HAVE UPDATED PATIENT'S INSPECT AND IT IS SCANNED INTO CHART. AS FAR AS TESTOSTERONE INJECTIONS, I THOUGHT THAT HE WAS RECEIVING THEM HERE IN OFFICE?  THANK YOU.   MATTHEW - SCAN - INSPECT/ [facility]/ 7-6-2022 (07/06/2022)

## 2022-07-07 RX ORDER — HYDROCODONE BITARTRATE AND ACETAMINOPHEN 5; 325 MG/1; MG/1
1 TABLET ORAL EVERY 4 HOURS PRN
Qty: 10 TABLET | Refills: 0 | Status: SHIPPED | OUTPATIENT
Start: 2022-07-07 | End: 2022-07-09

## 2022-07-07 RX ORDER — SYRINGE W-NEEDLE,DISPOSAB,3 ML 25GX5/8"
1 SYRINGE, EMPTY DISPOSABLE MISCELLANEOUS WEEKLY
Qty: 100 EACH | Refills: 3 | Status: SHIPPED | OUTPATIENT
Start: 2022-07-07 | End: 2022-10-06 | Stop reason: SDUPTHER

## 2022-07-26 DIAGNOSIS — E29.1 HYPOGONADISM IN MALE: Primary | ICD-10-CM

## 2022-07-26 DIAGNOSIS — G47.33 OSA ON CPAP: Primary | ICD-10-CM

## 2022-07-26 DIAGNOSIS — Z99.89 OSA ON CPAP: Primary | ICD-10-CM

## 2022-07-26 RX ORDER — TESTOSTERONE CYPIONATE 200 MG/ML
200 INJECTION, SOLUTION INTRAMUSCULAR
Qty: 2 ML | Refills: 2 | Status: SHIPPED | OUTPATIENT
Start: 2022-07-26 | End: 2022-11-29

## 2022-07-26 NOTE — TELEPHONE ENCOUNTER
Rx Refill Note    PROTOCOLS NOT MET     Requested Prescriptions     Pending Prescriptions Disp Refills   • Testosterone Cypionate (DEPOTESTOTERONE CYPIONATE) 200 MG/ML injection        Last office visit with prescribing clinician: 7/1/2022      Next office visit with prescribing clinician: 7/26/2022            Jeannie Busby MA  07/26/22, 15:21 EDT

## 2022-07-26 NOTE — TELEPHONE ENCOUNTER
Caller: Stephon Martini    Relationship: Self    Best call back number: [phone]*    Requested Prescriptions:   Requested Prescriptions     Pending Prescriptions Disp Refills   • Testosterone Cypionate (DEPOTESTOTERONE CYPIONATE) 200 MG/ML injection          Pharmacy where request should be sent: [facility] [address] - [phone]  - [phone]     Additional details provided by patient: PATIENT STATES HE IS OUT OF MEDICATION    Does the patient have less than a 3 day supply:  [x] Yes  [] No    Prema Medellin   07/26/22 13:43 EDT

## 2022-07-26 NOTE — TELEPHONE ENCOUNTER
Caller: Stephon Martini    Relationship: Self    Best call back number: [phone]*    What is the best time to reach you: ANYTIME    Who are you requesting to speak with (clinical staff, provider,  specific staff member): CLINICAL    What was the call regarding: PATIENT CALLING FOR ORDER TO BE SENT TO [facility] FOR A CPAP MASK AND HOSE. THE PATIENT IS REQUESTING THE ORDER TO BE SENT TO [facility] IN [place] OFF [address].    Do you require a callback: YES TO ADVISE

## 2022-08-01 ENCOUNTER — TELEPHONE (OUTPATIENT)
Dept: FAMILY MEDICINE CLINIC | Facility: CLINIC | Age: 37
End: 2022-08-01

## 2022-08-01 RX ORDER — BENZALKONIUM CHLORIDE 1.3 MG/ML
1 CLOTH TOPICAL
Qty: 4 EACH | Refills: 2 | Status: SHIPPED | OUTPATIENT
Start: 2022-08-01

## 2022-08-01 NOTE — TELEPHONE ENCOUNTER
Caller: Stephon Martini    Relationship: Self    Best call back number: [phone]      What was the call regarding: PATIENT IS CALLING IN REGARDS TO A PREVIOUS REQUEST THAT PATIENT CALLED IN ON Tuesday OF LAST WEEK REGARDING A NEW CPAP MASK AND HOSE. PATIENT REQUESTED THAT IT BE SENT TO [facility] IN [place] OFF [address]. PATIENT STATED THAT HE NEVER RECEIVED A CALL BACK ABOUT IT AND WANTED TO KNOW THE STATUS. PATIENT STATED THAT THE ORDER COULD BE SENT TO [facility] OR HE COULD COME BY AND PICK IT UP. PLEASE ADVISE.    Do you require a callback: YES

## 2022-08-29 DIAGNOSIS — I10 ESSENTIAL HYPERTENSION: ICD-10-CM

## 2022-08-29 NOTE — TELEPHONE ENCOUNTER
Caller: Stephon Martini    Relationship: Self    Best call back number: [phone]  Requested Prescriptions:   Requested Prescriptions     Pending Prescriptions Disp Refills   • lisinopril (PRINIVIL,ZESTRIL) 20 MG tablet 90 tablet 0     Sig: Take 1 tablet by mouth Daily.        Pharmacy where request should be sent: [address] [phone]  - [phone] FX     Additional details provided by patient: PATIENT IS ALSO ASKING IF A STEROID BE CALLED IN FOR HIM.  HAS HAD A LINGERING COUGH FOR 3 WEEKS SINCE HAVING COVID     Does the patient have less than a 3 day supply:  [x] Yes  [] No    Florin Hanna Rep   08/29/22 14:41 EDT

## 2022-08-30 RX ORDER — LISINOPRIL 20 MG/1
20 TABLET ORAL DAILY
Qty: 90 TABLET | Refills: 0 | Status: SHIPPED | OUTPATIENT
Start: 2022-08-30 | End: 2023-01-26

## 2022-09-13 ENCOUNTER — TELEPHONE (OUTPATIENT)
Dept: FAMILY MEDICINE CLINIC | Facility: CLINIC | Age: 37
End: 2022-09-13

## 2022-09-13 DIAGNOSIS — E78.00 HYPERCHOLESTEREMIA: ICD-10-CM

## 2022-09-13 RX ORDER — PRAVASTATIN SODIUM 20 MG
20 TABLET ORAL DAILY
Qty: 90 TABLET | Refills: 1 | Status: SHIPPED | OUTPATIENT
Start: 2022-09-13 | End: 2022-09-19 | Stop reason: SDUPTHER

## 2022-09-13 NOTE — TELEPHONE ENCOUNTER
Caller: Stephon Martini    Relationship: Self    Best call back number: [phone]     Pharmacy where request should be sent:    [facility] [address] [place] [address] - [phone]  - [phone]   [phone]  Additional details provided by patient: PATIENT IS CALLING TO REQUEST A NEW PRESCRIPTION FOR THE CHOLESTEROL MEDICATION THAT HE WAS ON PRIOR TO THE FOLLOWING.     pravastatin (Pravachol) 20 MG tablet     HE STATES THE PRAVASTATIN IS CAUSING HIM TO GAIN WEIGHT.    Does the patient have less than a 3 day supply:  [x] Yes  [] No    Chani Sotomayor, RegSched Rep   09/13/22 15:15 EDT     PLEASE ADVISE.

## 2022-09-14 ENCOUNTER — APPOINTMENT (OUTPATIENT)
Dept: ULTRASOUND IMAGING | Facility: HOSPITAL | Age: 37
End: 2022-09-14

## 2022-09-14 ENCOUNTER — HOSPITAL ENCOUNTER (EMERGENCY)
Facility: HOSPITAL | Age: 37
Discharge: HOME | End: 2022-09-14
Attending: EMERGENCY MEDICINE | Admitting: EMERGENCY MEDICINE

## 2022-09-14 VITALS
HEIGHT: 72 IN | WEIGHT: 315 LBS | OXYGEN SATURATION: 96 % | RESPIRATION RATE: 16 BRPM | HEART RATE: 90 BPM | SYSTOLIC BLOOD PRESSURE: 147 MMHG | DIASTOLIC BLOOD PRESSURE: 89 MMHG | BODY MASS INDEX: 42.66 KG/M2 | TEMPERATURE: 99.4 F

## 2022-09-14 DIAGNOSIS — M10.072 ACUTE IDIOPATHIC GOUT OF LEFT FOOT: Primary | ICD-10-CM

## 2022-09-14 LAB
ANION GAP SERPL CALCULATED.3IONS-SCNC: 9 MMOL/L (ref 5–15)
BASOPHILS # BLD AUTO: 0.02 10*3/MM3 (ref 0–0.2)
BASOPHILS NFR BLD AUTO: 0.3 % (ref 0–1.5)
BUN SERPL-MCNC: 17 MG/DL (ref 6–20)
BUN/CREAT SERPL: 19.1 (ref 7–25)
CALCIUM SPEC-SCNC: 9.8 MG/DL (ref 8.6–10.5)
CHLORIDE SERPL-SCNC: 95 MMOL/L (ref 98–107)
CO2 SERPL-SCNC: 31 MMOL/L (ref 22–29)
CREAT SERPL-MCNC: 0.89 MG/DL (ref 0.76–1.27)
DEPRECATED RDW RBC AUTO: 39.3 FL (ref 37–54)
EGFRCR SERPLBLD CKD-EPI 2021: 113.2 ML/MIN/1.73
EOSINOPHIL # BLD AUTO: 0.13 10*3/MM3 (ref 0–0.4)
EOSINOPHIL NFR BLD AUTO: 1.6 % (ref 0.3–6.2)
ERYTHROCYTE [DISTWIDTH] IN BLOOD BY AUTOMATED COUNT: 13.5 % (ref 12.3–15.4)
GLUCOSE SERPL-MCNC: 93 MG/DL (ref 65–99)
HCT VFR BLD AUTO: 46.2 % (ref 37.5–51)
HGB BLD-MCNC: 15.7 G/DL (ref 13–17.7)
IMM GRANULOCYTES # BLD AUTO: 0.12 10*3/MM3 (ref 0–0.05)
IMM GRANULOCYTES NFR BLD AUTO: 1.5 % (ref 0–0.5)
LYMPHOCYTES # BLD AUTO: 2.22 10*3/MM3 (ref 0.7–3.1)
LYMPHOCYTES NFR BLD AUTO: 28.1 % (ref 19.6–45.3)
MCH RBC QN AUTO: 27.1 PG (ref 26.6–33)
MCHC RBC AUTO-ENTMCNC: 34 G/DL (ref 31.5–35.7)
MCV RBC AUTO: 79.7 FL (ref 79–97)
MONOCYTES # BLD AUTO: 0.61 10*3/MM3 (ref 0.1–0.9)
MONOCYTES NFR BLD AUTO: 7.7 % (ref 5–12)
NEUTROPHILS NFR BLD AUTO: 4.81 10*3/MM3 (ref 1.7–7)
NEUTROPHILS NFR BLD AUTO: 60.8 % (ref 42.7–76)
PLATELET # BLD AUTO: 249 10*3/MM3 (ref 140–450)
PMV BLD AUTO: 9.1 FL (ref 6–12)
POTASSIUM SERPL-SCNC: 4.3 MMOL/L (ref 3.5–5.2)
RBC # BLD AUTO: 5.8 10*6/MM3 (ref 4.14–5.8)
SODIUM SERPL-SCNC: 135 MMOL/L (ref 136–145)
WBC NRBC COR # BLD: 7.91 10*3/MM3 (ref 3.4–10.8)

## 2022-09-14 PROCEDURE — 93971 EXTREMITY STUDY: CPT | Performed by: EMERGENCY MEDICINE

## 2022-09-14 PROCEDURE — 99282 EMERGENCY DEPT VISIT SF MDM: CPT | Performed by: EMERGENCY MEDICINE

## 2022-09-14 PROCEDURE — 36415 COLL VENOUS BLD VENIPUNCTURE: CPT

## 2022-09-14 PROCEDURE — 85025 COMPLETE CBC W/AUTO DIFF WBC: CPT | Performed by: EMERGENCY MEDICINE

## 2022-09-14 PROCEDURE — 80048 BASIC METABOLIC PNL TOTAL CA: CPT | Performed by: EMERGENCY MEDICINE

## 2022-09-14 PROCEDURE — 99282 EMERGENCY DEPT VISIT SF MDM: CPT

## 2022-09-15 NOTE — DISCHARGE INSTRUCTIONS
Please read the attached discharge instructions.  Come back to the emergency department for any new or concerning symptoms.    I am not convinced that this is an infection.  I recommend against antibiotic use for this.  Please take ibuprofen 600 mg to 800 mg 3 times daily as needed for pain.  You may also take Tylenol 1000 mg every 8 hours as needed for pain.

## 2022-09-15 NOTE — ED PROVIDER NOTES
[facility] FREE-STANDING ED / URGENT CARE    Patient: Stephon Martini 1985 [id number]  Physician: Huyen James MD  DOS: 9/14/2022    [facility]  History of Present Illness  36-year-old male with a history of asthma, hypertension, and gout presents with 1 day of pain primarily in the left lateral foot associated with mild swelling.  There is additionally pain in the arch of the foot with weightbearing.  Pain in the lateral foot is worse with weightbearing.  He states the pain is starting to progress up his calf as well, especially painful in the location of the left lateral calf.  No redness noted.  No systemic symptoms.  He states he has never had gout in this location before, typically in the first MTP joint.  He went to outside hospital today and states he got an x-ray and was diagnosed with cellulitis, prescribed anti-inflammatories and antibiotics.  He has not taken these yet.        ROS  As reviewed in HPI above.    Patient Active Problem List   Diagnosis   • Allergic rhinitis   • HTN (hypertension)   • Morbid obesity due to excess calories (HCC)     Prior to Admission medications    Medication Sig Start Date End Date Taking? Authorizing Provider   albuterol sulfate  (90 Base) MCG/ACT inhaler Inhale 2 puffs Every 4 (Four) Hours As Needed for Wheezing or Shortness of Air.    Provider, MD Dinesh   allopurinol (Zyloprim) 100 MG tablet Take 1 tablet by mouth Daily. 3/15/22   Cristel Figuerao APRN   clonazePAM (KlonoPIN) 0.25 MG disintegrating tablet Place 1 tablet on the tongue 2 (Two) Times a Day As Needed for Anxiety. 5/27/22   Stephon Young APRN   lisinopril (PRINIVIL,ZESTRIL) 20 MG tablet Take 1 tablet by mouth Daily. 8/30/22   Stephon Young APRN   pravastatin (Pravachol) 20 MG tablet Take 1 tablet by mouth Daily. 9/13/22   Stephon Young APRN   Respiratory Therapy Supplies (CareTouch CPAP & BIPAP Hose) misc 1 each every night at bedtime. 8/1/22   Hector  "LINDA Friedman   Syringe 20G X 1-\" 3 ML misc 1 each 1 (One) Time Per Week. 22   Stephon Young APRN   Testosterone Cypionate (DEPOTESTOTERONE CYPIONATE) 200 MG/ML injection Inject 1 mL into the appropriate muscle as directed by prescriber Every 14 (Fourteen) Days. 22   Stephon Young APRN   triamterene-hydrochlorothiazide (MAXZIDE-25) 37.5-25 MG per tablet Take 1 tablet by mouth Daily. 22   Stephon Young APRN     Past Medical History:   Diagnosis Date   • Anxiety    • Asthma    • Carpal tunnel syndrome, bilateral    • COVID-19    • Hyperlipidemia    • Hypertension    • Kidney stone    • Low testosterone in male    • Obesity    • FANTA (obstructive sleep apnea)      History reviewed. No pertinent surgical history.  Family History   Problem Relation Age of Onset   • Heart attack Mother    • Hyperlipidemia Father    • Hypertension Father    • Stroke Father    • Hyperthyroidism Sister    • Parkinsonism Maternal Grandfather    • Breast cancer Paternal Grandmother    • Stroke Paternal Grandfather    • Nephrolithiasis Paternal Grandfather      Social History     Socioeconomic History   • Marital status:    Tobacco Use   • Smoking status: Former Smoker     Packs/day: 1.00     Years: 5.00     Pack years: 5.00     Types: Cigarettes     Start date:      Quit date:      Years since quittin.7   • Smokeless tobacco: Never Used   Substance and Sexual Activity   • Alcohol use: Not Currently     Comment: HX OF ALCOHOLISM    • Drug use: Never   • Sexual activity: Defer     Allergies   Allergen Reactions   • Bactrim [Sulfamethoxazole-Trimethoprim] Other (See Comments)     HOT FLASH/ BURNING INSIDE   • Penicillins Hives       PHYSICAL EXAM  Vital Signs (last day)     Date/Time Temp Temp src Pulse Resp BP Patient Position SpO2    22 1945 -- -- 84 -- -- -- 95    22 1832 99.4 (37.4) Oral 98 25 135/87 Lying 97        Physical Exam  Vitals and nursing note " reviewed.   Constitutional:       General: He is not in acute distress.     Appearance: He is well-developed.   HENT:      Head: Normocephalic and atraumatic.   Eyes:      Conjunctiva/sclera: Conjunctivae normal.   Pulmonary:      Effort: Pulmonary effort is normal.   Abdominal:      General: There is no distension.   Musculoskeletal:      Comments: Mild swelling of the left foot, nonpitting.  Mild tenderness over the left lateral forefoot.  Mild ankle swelling.  No erythema noted.   Skin:     General: Skin is warm and dry.   Neurological:      Mental Status: He is alert.   Psychiatric:         Mood and Affect: Mood normal.         Behavior: Behavior normal.           DIAGNOSTICS  US Venous Doppler Lower Extremity Left (duplex)    Result Date: 9/14/2022  IMPRESSION : 1.     No definite evidence for deep venous thrombosis within the visualized deep venous system of the left lower extremity.  Electronically Signed By-Gabriel Locke MD On:9/14/2022 9:25 PM This report was finalized on [id number] by  Gabriel Locke MD.      Labs Reviewed   BASIC METABOLIC PANEL - Abnormal; Notable for the following components:       Result Value    Sodium 135 (*)     Chloride 95 (*)     CO2 31.0 (*)     All other components within normal limits    Narrative:     GFR Normal >60  Chronic Kidney Disease <60  Kidney Failure <15     CBC WITH AUTO DIFFERENTIAL - Abnormal; Notable for the following components:    Immature Grans % 1.5 (*)     Immature Grans, Absolute 0.12 (*)     All other components within normal limits   CBC AND DIFFERENTIAL    Narrative:     The following orders were created for panel order CBC & Differential.  Procedure                               Abnormality         Status                     ---------                               -----------         ------                     CBC Auto Differential[[id number]]        Abnormal            Final result                 Please view results for these tests on the individual  orders.         MDM  Number of Diagnoses or Management Options  Acute idiopathic gout of left foot  Diagnosis management comments: With the absence of erythema and no leukocytosis, I think cellulitis is unlikely.  More likely is a gout flare of the forefoot.  X-ray reportedly negative at outside hospital.  Patient advised to not take the antibiotic, but to take the anti-inflammatory.  Given return precautions.           Medications - No data to display    Procedures    Final diagnoses:   Acute idiopathic gout of left foot       Stephon Young, APRN  [address] [facility] [place] [address]  [phone]    In 3 days        ED Disposition     ED Disposition   Discharge    Condition   Stable    Comment   --             MD Erika Seth, Huyen Jacobs MD  09/15/22 0117

## 2022-09-19 ENCOUNTER — OFFICE VISIT (OUTPATIENT)
Dept: FAMILY MEDICINE CLINIC | Facility: CLINIC | Age: 37
End: 2022-09-19

## 2022-09-19 VITALS
DIASTOLIC BLOOD PRESSURE: 90 MMHG | HEIGHT: 72 IN | OXYGEN SATURATION: 95 % | BODY MASS INDEX: 42.66 KG/M2 | RESPIRATION RATE: 16 BRPM | WEIGHT: 315 LBS | HEART RATE: 95 BPM | SYSTOLIC BLOOD PRESSURE: 144 MMHG | TEMPERATURE: 98.2 F

## 2022-09-19 DIAGNOSIS — E29.1 HYPOGONADISM IN MALE: ICD-10-CM

## 2022-09-19 DIAGNOSIS — R73.03 PREDIABETES: ICD-10-CM

## 2022-09-19 DIAGNOSIS — R79.89 LOW TESTOSTERONE IN MALE: ICD-10-CM

## 2022-09-19 DIAGNOSIS — E78.00 HYPERCHOLESTEREMIA: ICD-10-CM

## 2022-09-19 DIAGNOSIS — M10.9 ACUTE GOUT OF LEFT FOOT, UNSPECIFIED CAUSE: Primary | ICD-10-CM

## 2022-09-19 PROCEDURE — 96372 THER/PROPH/DIAG INJ SC/IM: CPT | Performed by: REGISTERED NURSE

## 2022-09-19 PROCEDURE — 99215 OFFICE O/P EST HI 40 MIN: CPT | Performed by: REGISTERED NURSE

## 2022-09-19 RX ORDER — PREDNISONE 20 MG/1
20 TABLET ORAL DAILY
Qty: 7 TABLET | Refills: 0 | Status: SHIPPED | OUTPATIENT
Start: 2022-09-19 | End: 2023-01-26

## 2022-09-19 RX ORDER — PRAVASTATIN SODIUM 20 MG
20 TABLET ORAL DAILY
Qty: 90 TABLET | Refills: 1 | Status: SHIPPED | OUTPATIENT
Start: 2022-09-19 | End: 2023-01-26 | Stop reason: SDUPTHER

## 2022-09-19 RX ORDER — TESTOSTERONE ENANTHATE 200 MG/ML
200 INJECTION, SOLUTION INTRAMUSCULAR ONCE
Status: DISCONTINUED | OUTPATIENT
Start: 2022-09-19 | End: 2022-09-19

## 2022-09-19 RX ORDER — TESTOSTERONE CYPIONATE 200 MG/ML
200 INJECTION, SOLUTION INTRAMUSCULAR ONCE
Status: COMPLETED | OUTPATIENT
Start: 2022-09-19 | End: 2022-09-19

## 2022-09-19 RX ADMIN — TESTOSTERONE CYPIONATE 200 MG: 200 INJECTION, SOLUTION INTRAMUSCULAR at 16:07

## 2022-09-21 DIAGNOSIS — F41.9 ANXIETY: ICD-10-CM

## 2022-09-21 RX ORDER — CLONAZEPAM 0.25 MG/1
0.25 TABLET, ORALLY DISINTEGRATING ORAL 2 TIMES DAILY PRN
Qty: 60 TABLET | Refills: 0 | Status: SHIPPED | OUTPATIENT
Start: 2022-09-21 | End: 2022-12-07 | Stop reason: SDUPTHER

## 2022-09-21 RX ORDER — ALLOPURINOL 100 MG/1
100 TABLET ORAL DAILY
Qty: 90 TABLET | Refills: 0 | Status: SHIPPED | OUTPATIENT
Start: 2022-09-21 | End: 2022-12-19

## 2022-09-21 NOTE — TELEPHONE ENCOUNTER
Rx Refill Note  Requested Prescriptions     Pending Prescriptions Disp Refills   • allopurinol (Zyloprim) 100 MG tablet 90 tablet 0     Sig: Take 1 tablet by mouth Daily.   • clonazePAM (KlonoPIN) 0.25 MG disintegrating tablet 60 tablet 0     Sig: Place 1 tablet on the tongue 2 (Two) Times a Day As Needed for Anxiety.      Last office visit with prescribing clinician: 9/19/2022      Next office visit with prescribing clinician: Visit date not found     MATTHEW - SCAN - INSPECT/[facility]/ 9-21-22 (09/21/2022)         Jeannie Busby MA  09/21/22, 14:59 EDT

## 2022-09-21 NOTE — TELEPHONE ENCOUNTER
Caller: Stephon Martini    Relationship: Self    Best call back number: [phone]    Requested Prescriptions:   Requested Prescriptions     Pending Prescriptions Disp Refills   • allopurinol (Zyloprim) 100 MG tablet 90 tablet 0     Sig: Take 1 tablet by mouth Daily.   • clonazePAM (KlonoPIN) 0.25 MG disintegrating tablet 60 tablet 0     Sig: Place 1 tablet on the tongue 2 (Two) Times a Day As Needed for Anxiety.        Pharmacy where request should be sent: [facility]/PHARMACY #[id number] [address] [phone] [facility] [phone]      Additional details provided by patient: PATIENT IS OUT OF BOTH MEDICATIONS.    Does the patient have less than a 3 day supply:  [x] Yes  [] No    Florin Cárdenas Rep   09/21/22 14:17 EDT

## 2022-10-06 DIAGNOSIS — R79.89 LOW SERUM TESTOSTERONE: ICD-10-CM

## 2022-10-06 DIAGNOSIS — E29.1 HYPOGONADISM IN MALE: ICD-10-CM

## 2022-10-06 RX ORDER — SYRINGE W-NEEDLE,DISPOSAB,3 ML 25GX5/8"
1 SYRINGE, EMPTY DISPOSABLE MISCELLANEOUS WEEKLY
Qty: 100 EACH | Refills: 3 | Status: SHIPPED | OUTPATIENT
Start: 2022-10-06

## 2022-10-06 NOTE — TELEPHONE ENCOUNTER
Caller: Stephon Martini    Relationship: Self    Best call back number:       Requested Prescriptions:   SYRINGES FOR HIS TESTOSTERONE INJECTIONS         Pharmacy where request should be sent: [facility]/PHARMACY #[id number] - [place], [place] - [address] [facility] [phone] [facility] [phone]      Additional details provided by patient:     PATIENT STATED THAT MARIBEL YU WAS GOING TO CALL IN A SMALLER NEEDLE THAN THE CURRENT SCRIPT.      Does the patient have less than a 3 day supply:  [] Yes  [] No    Florin Lugo Rep   10/06/22 08:30 EDT

## 2022-10-25 ENCOUNTER — TELEPHONE (OUTPATIENT)
Dept: FAMILY MEDICINE CLINIC | Facility: CLINIC | Age: 37
End: 2022-10-25

## 2022-10-25 DIAGNOSIS — G47.33 OSA ON CPAP: Primary | ICD-10-CM

## 2022-10-25 DIAGNOSIS — Z99.89 OSA ON CPAP: Primary | ICD-10-CM

## 2022-11-28 DIAGNOSIS — E29.1 HYPOGONADISM IN MALE: ICD-10-CM

## 2022-11-29 RX ORDER — TESTOSTERONE CYPIONATE 200 MG/ML
INJECTION, SOLUTION INTRAMUSCULAR
Qty: 2 ML | Refills: 1 | Status: SHIPPED | OUTPATIENT
Start: 2022-11-29 | End: 2022-12-07 | Stop reason: SDUPTHER

## 2022-12-07 ENCOUNTER — TELEMEDICINE (OUTPATIENT)
Dept: FAMILY MEDICINE CLINIC | Facility: CLINIC | Age: 37
End: 2022-12-07

## 2022-12-07 DIAGNOSIS — Z13.220 SCREENING FOR LIPID DISORDERS: ICD-10-CM

## 2022-12-07 DIAGNOSIS — Z13.0 SCREENING FOR ENDOCRINE, METABOLIC AND IMMUNITY DISORDER: ICD-10-CM

## 2022-12-07 DIAGNOSIS — E29.1 HYPOGONADISM IN MALE: ICD-10-CM

## 2022-12-07 DIAGNOSIS — Z13.1 SCREENING FOR DIABETES MELLITUS: ICD-10-CM

## 2022-12-07 DIAGNOSIS — E11.65 TYPE 2 DIABETES MELLITUS WITH HYPERGLYCEMIA, WITHOUT LONG-TERM CURRENT USE OF INSULIN: Primary | ICD-10-CM

## 2022-12-07 DIAGNOSIS — Z13.228 SCREENING FOR ENDOCRINE, METABOLIC AND IMMUNITY DISORDER: ICD-10-CM

## 2022-12-07 DIAGNOSIS — Z13.29 SCREENING FOR ENDOCRINE, METABOLIC AND IMMUNITY DISORDER: ICD-10-CM

## 2022-12-07 DIAGNOSIS — Z13.29 SCREENING FOR THYROID DISORDER: ICD-10-CM

## 2022-12-07 DIAGNOSIS — F41.9 ANXIETY: ICD-10-CM

## 2022-12-07 PROCEDURE — 99214 OFFICE O/P EST MOD 30 MIN: CPT | Performed by: REGISTERED NURSE

## 2022-12-07 RX ORDER — TIRZEPATIDE 2.5 MG/.5ML
2.5 INJECTION, SOLUTION SUBCUTANEOUS WEEKLY
Qty: 2 ML | Refills: 0 | COMMUNITY
Start: 2022-12-07 | End: 2023-01-26

## 2022-12-07 RX ORDER — TESTOSTERONE CYPIONATE 200 MG/ML
200 INJECTION, SOLUTION INTRAMUSCULAR
Qty: 2 ML | Refills: 1 | Status: SHIPPED | OUTPATIENT
Start: 2022-12-07 | End: 2023-01-26 | Stop reason: SDUPTHER

## 2022-12-07 RX ORDER — CLONAZEPAM 0.25 MG/1
0.25 TABLET, ORALLY DISINTEGRATING ORAL 2 TIMES DAILY PRN
Qty: 60 TABLET | Refills: 0 | Status: SHIPPED | OUTPATIENT
Start: 2022-12-07

## 2022-12-07 RX ORDER — BLOOD-GLUCOSE SENSOR
1 EACH MISCELLANEOUS
Qty: 6 EACH | Refills: 1 | Status: SHIPPED | OUTPATIENT
Start: 2022-12-07

## 2022-12-07 RX ORDER — TIRZEPATIDE 5 MG/.5ML
5 INJECTION, SOLUTION SUBCUTANEOUS WEEKLY
Qty: 2 ML | Refills: 1 | Status: SHIPPED | OUTPATIENT
Start: 2023-01-05 | End: 2023-01-26 | Stop reason: SDUPTHER

## 2022-12-07 NOTE — TELEPHONE ENCOUNTER
New referral has been sent to [facility] sleep medicine in [place]. Can discuss further with provider at visit

## 2022-12-07 NOTE — PROGRESS NOTES
[facility] PRIMARY CARE  Patient: Stephon Martini   Age: 37 y.o.  Sex: male  :  1985  MRN: [id number]    Stephon Martini was located at home and I was located at my office for this telemedicine/telephone encounter. We utilized the telephone [employer] video option for the encounter and Stephon Martini and I were able to hear and see each other simultaneously in real time. I introduced myself and verified Stephon Martini identity. I explained how the telemedicine visit will occur. I advised Stephon Martini that technology-related delays and breaches of privacy are potential risks associated with conducting the encounter via telemedicine.    I also advised Stephon Martini that at any point he may terminate the telemedicine encounter and withdraw his consent for receiving care via telemedicine without affecting his ability to receive future care from us, and that I may also terminate the telemedicine encounter if I determine that an in-person visit is more appropriate for the condition[s] for which treatment is sought.    Having covered these considerations, Stephon Martini verbally acknowledged them and gave consent for the use of telemedicine in his care.    Start time: 1520  End time 1539    CHIEF COMPLAINT:  Chief Complaint   Patient presents with   • Diabetes        The following portions of the chart were reviewed this encounter and updated as appropriate:    Patient is a 37-year-old male presents to the clinic today via telehealth visit to discuss diabetes.  Patient denies any chest pain, shortness of breath, or any fevers.  Patient denies any known exposure to COVID, flu, or any other contagious illnesses.    In regards to diabetes, patient is continuing to run glucose levels in the upper 100s or higher he shares.  Patient is currently taking Farxiga but shares that he does not like this medication that it is not helping as much as he had hoped it would.   Patient is interested in further treatment and questions about Mounjaro.  Patient shares that he had heard other people having excellent glycemic control and losing weight on this.  Patient would like to try Mounjaro.  Risk versus benefit discussed with patient.    Patient also would like refill of testosterone today.  Patient shares that testosterone injections are working well.  He has no concerns about this at this time.       There were no vitals taken for this visit.   Allergies   Allergen Reactions   • Bactrim [Sulfamethoxazole-Trimethoprim] Other (See Comments)     HOT FLASH/ BURNING INSIDE   • Penicillins Hives     Past Medical History:   Diagnosis Date   • Anxiety    • Asthma    • Carpal tunnel syndrome, bilateral    • COVID-19    • Gout 09/2022   • Hyperlipidemia    • Hypertension    • Kidney stone    • Low testosterone in male    • Obesity    • FANTA (obstructive sleep apnea)        REVIEW OF SYSTEMS  Review of Systems   Constitutional: Negative.  Negative for activity change, chills, fatigue and fever.   HENT: Negative.  Negative for congestion, dental problem, ear pain, hearing loss, rhinorrhea, sinus pain, sore throat, tinnitus and trouble swallowing.    Eyes: Negative.  Negative for pain and visual disturbance.   Respiratory: Negative.  Negative for cough, chest tightness, shortness of breath and wheezing.    Cardiovascular: Negative.  Negative for chest pain, palpitations and leg swelling.   Gastrointestinal: Negative.  Negative for abdominal pain, diarrhea, nausea and vomiting.   Endocrine: Negative.  Negative for polydipsia, polyphagia and polyuria.   Genitourinary: Negative.  Negative for difficulty urinating, dysuria, frequency and urgency.   Musculoskeletal: Negative.  Negative for arthralgias, back pain and myalgias.   Skin: Negative.  Negative for color change, pallor, rash and wound.   Allergic/Immunologic: Negative.  Negative for environmental allergies.   Neurological: Negative.  Negative  for dizziness, speech difficulty, weakness, light-headedness, numbness and headaches.   Hematological: Negative.    Psychiatric/Behavioral: Negative.  Negative for confusion, decreased concentration, self-injury and suicidal ideas. The patient is not nervous/anxious.    All other systems reviewed and are negative.             LAB REVIEW  Admission on 09/14/2022, Discharged on 09/14/2022   Component Date Value Ref Range Status   • Glucose 09/14/2022 93  65 - 99 mg/dL Final   • BUN 09/14/2022 17  6 - 20 mg/dL Final   • Creatinine 09/14/2022 0.89  0.76 - 1.27 mg/dL Final   • Sodium 09/14/2022 135 (L)  136 - 145 mmol/L Final   • Potassium 09/14/2022 4.3  3.5 - 5.2 mmol/L Final   • Chloride 09/14/2022 95 (L)  98 - 107 mmol/L Final   • CO2 09/14/2022 31.0 (H)  22.0 - 29.0 mmol/L Final   • Calcium 09/14/2022 9.8  8.6 - 10.5 mg/dL Final   • BUN/Creatinine Ratio 09/14/2022 19.1  7.0 - 25.0 Final   • Anion Gap 09/14/2022 9.0  5.0 - 15.0 mmol/L Final   • eGFR 09/14/2022 113.2  >60.0 mL/min/1.73 Final    National Kidney Foundation and American Society of Nephrology (ASN) Task Force recommended calculation based on the Chronic Kidney Disease Epidemiology Collaboration (CKD-EPI) equation refit without adjustment for race.   • WBC 09/14/2022 7.91  3.40 - 10.80 10*3/mm3 Final   • RBC 09/14/2022 5.80  4.14 - 5.80 10*6/mm3 Final   • Hemoglobin 09/14/2022 15.7  13.0 - 17.7 g/dL Final   • Hematocrit 09/14/2022 46.2  37.5 - 51.0 % Final   • MCV 09/14/2022 79.7  79.0 - 97.0 fL Final   • MCH 09/14/2022 27.1  26.6 - 33.0 pg Final   • MCHC 09/14/2022 34.0  31.5 - 35.7 g/dL Final   • RDW 09/14/2022 13.5  12.3 - 15.4 % Final   • RDW-SD 09/14/2022 39.3  37.0 - 54.0 fl Final   • MPV 09/14/2022 9.1  6.0 - 12.0 fL Final   • Platelets 09/14/2022 249  140 - 450 10*3/mm3 Final   • Neutrophil % 09/14/2022 60.8  42.7 - 76.0 % Final   • Lymphocyte % 09/14/2022 28.1  19.6 - 45.3 % Final   • Monocyte % 09/14/2022 7.7  5.0 - 12.0 % Final   • Eosinophil %  09/14/2022 1.6  0.3 - 6.2 % Final   • Basophil % 09/14/2022 0.3  0.0 - 1.5 % Final   • Immature Grans % 09/14/2022 1.5 (H)  0.0 - 0.5 % Final   • Neutrophils, Absolute 09/14/2022 4.81  1.70 - 7.00 10*3/mm3 Final   • Lymphocytes, Absolute 09/14/2022 2.22  0.70 - 3.10 10*3/mm3 Final   • Monocytes, Absolute 09/14/2022 0.61  0.10 - 0.90 10*3/mm3 Final   • Eosinophils, Absolute 09/14/2022 0.13  0.00 - 0.40 10*3/mm3 Final   • Basophils, Absolute 09/14/2022 0.02  0.00 - 0.20 10*3/mm3 Final   • Immature Grans, Absolute 09/14/2022 0.12 (H)  0.00 - 0.05 10*3/mm3 Final           Diagnoses and all orders for this visit:    1. Type 2 diabetes mellitus with hyperglycemia, without long-term current use of insulin (HCC) (Primary)  -     Continuous Blood Gluc Sensor (FreeStyle Harjeet 3 Sensor) misc; 1 each Every 14 (Fourteen) Days.  Dispense: 6 each; Refill: 1  -     Tirzepatide (Mounjaro) 2.5 MG/0.5ML solution pen-injector; Inject 2.5 mg under the skin into the appropriate area as directed 1 (One) Time Per Week.  Dispense: 2 mL; Refill: 0  -     Tirzepatide (Mounjaro) 5 MG/0.5ML solution pen-injector; Inject 0.5 mL under the skin into the appropriate area as directed 1 (One) Time Per Week.  Dispense: 2 mL; Refill: 1  -     Hemoglobin A1c; Future  -     Microalbumin / Creatinine Urine Ratio - Urine, Clean Catch; Future    2. Hypogonadism in male  -     Testosterone Cypionate (DEPOTESTOTERONE CYPIONATE) 200 MG/ML injection; Inject 1 mL into the appropriate muscle as directed by prescriber Every 14 (Fourteen) Days.  Dispense: 2 mL; Refill: 1    3. Anxiety  -     clonazePAM (KlonoPIN) 0.25 MG disintegrating tablet; Place 1 tablet on the tongue 2 (Two) Times a Day As Needed for Anxiety.  Dispense: 60 tablet; Refill: 0    4. Screening for diabetes mellitus  -     Hemoglobin A1c; Future    5. Screening for endocrine, metabolic and immunity disorder  -     Comprehensive Metabolic Panel; Future  -     CBC & Differential; Future    6.  Screening for lipid disorders  -     Lipid Panel; Future    7. Screening for thyroid disorder  -     TSH; Future        -Initiate Mounjaro 2.5 mg weekly for 4 weeks.  Then we will increase to 5 mg weekly.  -Fasting blood work tomorrow or when patient is available.  -Renewal of clonazepam and testosterone requested today.  -Follow-up in 1 month to check progress and Mounjaro or sooner if needed.    1. Type 2 diabetes mellitus with hyperglycemia, without long-term current use of insulin (HCC)    2. Hypogonadism in male    3. Anxiety    4. Screening for diabetes mellitus    5. Screening for endocrine, metabolic and immunity disorder    6. Screening for lipid disorders    7. Screening for thyroid disorder         MDM  Number of Diagnoses or Management Options  Anxiety: established, improving  Hypogonadism in male: established, improving  Type 2 diabetes mellitus with hyperglycemia, without long-term current use of insulin (HCC): established, worsening     Amount and/or Complexity of Data Reviewed  Clinical lab tests: ordered and reviewed  Tests in the radiology section of CPT®: reviewed  Tests in the medicine section of CPT®: reviewed  Discussion of test results with the performing providers: no  Decide to obtain previous medical records or to obtain history from someone other than the patient: no  Obtain history from someone other than the patient: no  Review and summarize past medical records: yes  Discuss the patient with other providers: no  Independent visualization of images, tracings, or specimens: no    Risk of Complications, Morbidity, and/or Mortality  Presenting problems: moderate  Diagnostic procedures: low  Management options: moderate    Critical Care  Total time providing critical care: 30-74 minutes    Patient Progress  Patient progress: stable      LINDA Brandt, FNP-BC  12/7/2022 15:38 EST

## 2022-12-19 RX ORDER — ALLOPURINOL 100 MG/1
TABLET ORAL
Qty: 90 TABLET | Refills: 0 | Status: SHIPPED | OUTPATIENT
Start: 2022-12-19 | End: 2023-01-26

## 2023-01-25 DIAGNOSIS — I10 ESSENTIAL HYPERTENSION: ICD-10-CM

## 2023-01-26 ENCOUNTER — OFFICE VISIT (OUTPATIENT)
Dept: FAMILY MEDICINE CLINIC | Facility: CLINIC | Age: 38
End: 2023-01-26
Payer: COMMERCIAL

## 2023-01-26 VITALS
SYSTOLIC BLOOD PRESSURE: 140 MMHG | TEMPERATURE: 98.5 F | DIASTOLIC BLOOD PRESSURE: 88 MMHG | HEIGHT: 72 IN | OXYGEN SATURATION: 97 % | RESPIRATION RATE: 18 BRPM | WEIGHT: 315 LBS | BODY MASS INDEX: 42.66 KG/M2 | HEART RATE: 91 BPM

## 2023-01-26 DIAGNOSIS — E78.00 HYPERCHOLESTEREMIA: ICD-10-CM

## 2023-01-26 DIAGNOSIS — Z87.442 HISTORY OF KIDNEY STONES: ICD-10-CM

## 2023-01-26 DIAGNOSIS — E29.1 HYPOGONADISM IN MALE: ICD-10-CM

## 2023-01-26 DIAGNOSIS — E66.01 MORBID OBESITY DUE TO EXCESS CALORIES: ICD-10-CM

## 2023-01-26 DIAGNOSIS — R30.0 DYSURIA: Primary | ICD-10-CM

## 2023-01-26 DIAGNOSIS — R73.02 GLUCOSE INTOLERANCE (IMPAIRED GLUCOSE TOLERANCE): ICD-10-CM

## 2023-01-26 DIAGNOSIS — R10.9 ACUTE LEFT FLANK PAIN: ICD-10-CM

## 2023-01-26 PROBLEM — G47.33 OSA ON CPAP: Status: ACTIVE | Noted: 2023-01-26

## 2023-01-26 PROBLEM — F41.9 ANXIETY: Status: ACTIVE | Noted: 2023-01-26

## 2023-01-26 PROBLEM — R73.03 PREDIABETES: Status: ACTIVE | Noted: 2023-01-26

## 2023-01-26 PROBLEM — E11.65 TYPE 2 DIABETES MELLITUS WITH HYPERGLYCEMIA, WITHOUT LONG-TERM CURRENT USE OF INSULIN: Status: ACTIVE | Noted: 2023-01-26

## 2023-01-26 PROBLEM — E55.9 VITAMIN D DEFICIENCY: Status: ACTIVE | Noted: 2023-01-26

## 2023-01-26 PROBLEM — E78.2 MIXED HYPERLIPIDEMIA: Status: ACTIVE | Noted: 2023-01-26

## 2023-01-26 PROBLEM — Z99.89 OSA ON CPAP: Status: ACTIVE | Noted: 2023-01-26

## 2023-01-26 LAB
BILIRUB BLD-MCNC: NEGATIVE MG/DL
CLARITY, POC: ABNORMAL
COLOR UR: YELLOW
GLUCOSE UR STRIP-MCNC: NEGATIVE MG/DL
HBA1C MFR BLD: 6 % (ref 3.5–5.6)
KETONES UR QL: NEGATIVE
LEUKOCYTE EST, POC: ABNORMAL
NITRITE UR-MCNC: NEGATIVE MG/ML
PH UR: 6 [PH] (ref 5–8)
PROT UR STRIP-MCNC: NEGATIVE MG/DL
RBC # UR STRIP: NEGATIVE /UL
SP GR UR: 1.02 (ref 1–1.03)
UROBILINOGEN UR QL: ABNORMAL

## 2023-01-26 PROCEDURE — 80061 LIPID PANEL: CPT | Performed by: REGISTERED NURSE

## 2023-01-26 PROCEDURE — 87086 URINE CULTURE/COLONY COUNT: CPT | Performed by: FAMILY MEDICINE

## 2023-01-26 PROCEDURE — 99214 OFFICE O/P EST MOD 30 MIN: CPT | Performed by: FAMILY MEDICINE

## 2023-01-26 PROCEDURE — 83036 HEMOGLOBIN GLYCOSYLATED A1C: CPT | Performed by: REGISTERED NURSE

## 2023-01-26 PROCEDURE — 80050 GENERAL HEALTH PANEL: CPT | Performed by: REGISTERED NURSE

## 2023-01-26 PROCEDURE — 81002 URINALYSIS NONAUTO W/O SCOPE: CPT | Performed by: FAMILY MEDICINE

## 2023-01-26 RX ORDER — TIRZEPATIDE 5 MG/.5ML
5 INJECTION, SOLUTION SUBCUTANEOUS WEEKLY
Qty: 2 ML | Refills: 0 | Status: SHIPPED | OUTPATIENT
Start: 2023-01-26

## 2023-01-26 RX ORDER — LISINOPRIL 20 MG/1
TABLET ORAL
Qty: 30 TABLET | Refills: 1 | Status: SHIPPED | OUTPATIENT
Start: 2023-01-26 | End: 2023-03-20

## 2023-01-26 RX ORDER — CYCLOBENZAPRINE HCL 5 MG
5 TABLET ORAL DAILY PRN
Qty: 12 TABLET | Refills: 0 | Status: SHIPPED | OUTPATIENT
Start: 2023-01-26 | End: 2023-04-05 | Stop reason: SDUPTHER

## 2023-01-26 RX ORDER — PRAVASTATIN SODIUM 20 MG
20 TABLET ORAL DAILY
Qty: 90 TABLET | Refills: 0 | Status: SHIPPED | OUTPATIENT
Start: 2023-01-26 | End: 2023-02-07

## 2023-01-26 RX ORDER — TESTOSTERONE CYPIONATE 200 MG/ML
200 INJECTION, SOLUTION INTRAMUSCULAR
Qty: 2 ML | Refills: 0 | Status: SHIPPED | OUTPATIENT
Start: 2023-01-26

## 2023-01-26 NOTE — PROGRESS NOTES
"Chief Complaint   Patient presents with   • Urinary Tract Infection     Patient has pain in lower back, strong smelling urine, painful urination, decreased flow. Duration of three days.    • Med Refill     Patient is needing his mounjaro 5mg sent to pharmacy       Subjective   Stephon Martini is a 37 y.o. male.     Difficulty Urinating  This is a new problem. The current episode started in the past 7 days. The problem occurs constantly. Associated symptoms include urinary symptoms. Pertinent negatives include no change in bowel habit or fever. He has tried nothing for the symptoms.     Patient reports a 3-day history of difficulty urinating.  Associated symptoms include some hesitancy.  His urine has been strong in odor.  There has been no hematuria.  Associated symptoms include some lower left-sided flank pain.  He denies STD exposure.  Patient does have a prior history of at least 5 kidney stones.  Typically, he will take medications (an antibiotic and a muscle relaxer) to \"flush the stone out.\"  He reports intolerance to some medications.  Allergies reviewed.  There is also an antibiotic that he cannot take because causes upper gastrointestinal symptoms.    Blood Sugar Problem  This is a chronic problem. The current episode started more than 1 year ago. The problem occurs daily. The problem has been unchanged.  Associated symptoms include urinary symptoms (unrelated). Pertinent negatives include no change in bowel habit or fever. Exacerbated by: sugars in diet, weight gain. Treatments tried: Mounjaro.     Patient is requesting to escalate his dose of Mounjarol from 2.5 to 5 mg weekly.  Chart indicates a prior history of diabetes mellitus.  Prior A1c's are reviewed.  A1c's are all under control.  Patient has gained weight (19 lbs) since starting Mounjaro.    Lab Results   Component Value Date    HGBA1C 5.9 (H) 07/01/2022    HGBA1C 6.2 (H) 06/09/2021    HGBA1C 5.8 (H) 10/07/2019       Hyperlipidemia  This is a " chronic problem. The current episode started more than 1 year ago. Exacerbating diseases include diabetes (glucose intolerance) and obesity. Current antihyperlipidemic treatment includes statins. Compliance problems include adherence to diet.      Low Testosterone  Patient requests refills of his testosterone injections.  He injects 200 mg every 2 weeks.  Last testosterone level 7/2022 and in the normal range (on testosterone injections).  Last PSA level 3 years ago (normal).  Patient has documented low testosterone levels in 2019.     I have reviewed relevant past medical, family, social and surgical history for this patient.  Medications review is done by myself, with patient.      Past Medical History :  Active Ambulatory Problems     Diagnosis Date Noted   • Allergic rhinitis 09/19/2013   • HTN (hypertension) 09/19/2013   • Morbid obesity due to excess calories (HCC) 01/16/2017   • Type 2 diabetes mellitus with hyperglycemia, without long-term current use of insulin (HCC) 01/26/2023   • Anxiety 01/26/2023   • Hypogonadism in male 01/26/2023   • FANTA on CPAP 01/26/2023   • Prediabetes 01/26/2023   • Mixed hyperlipidemia 01/26/2023   • Vitamin D deficiency 01/26/2023     Resolved Ambulatory Problems     Diagnosis Date Noted   • No Resolved Ambulatory Problems     Past Medical History:   Diagnosis Date   • Asthma    • Carpal tunnel syndrome, bilateral    • COVID-19    • Gout 09/2022   • Hyperlipidemia    • Hypertension    • Kidney stone    • Low testosterone in male    • Obesity    • FANTA (obstructive sleep apnea)        Medication List:    Current Outpatient Medications:   •  albuterol sulfate  (90 Base) MCG/ACT inhaler, Inhale 2 puffs Every 4 (Four) Hours As Needed for Wheezing or Shortness of Air., Disp: , Rfl:   •  clonazePAM (KlonoPIN) 0.25 MG disintegrating tablet, Place 1 tablet on the tongue 2 (Two) Times a Day As Needed for Anxiety., Disp: 60 tablet, Rfl: 0  •  Continuous Blood Gluc Sensor (FreeStyle  "Harjeet 3 Sensor) misc, 1 each Every 14 (Fourteen) Days., Disp: 6 each, Rfl: 1  •  lisinopril (PRINIVIL,ZESTRIL) 20 MG tablet, TAKE ONE TABLET BY MOUTH DAILY, Disp: 30 tablet, Rfl: 1  •  Respiratory Therapy Supplies (CareTouch CPAP & BIPAP Hose) misc, 1 each every night at bedtime., Disp: 4 each, Rfl: 2  •  Syringe 20G X 1-1/2\" 3 ML misc, 1 each 1 (One) Time Per Week., Disp: 100 each, Rfl: 3  •  Testosterone Cypionate (DEPOTESTOTERONE CYPIONATE) 200 MG/ML injection, Inject 1 mL into the appropriate muscle as directed by prescriber Every 14 (Fourteen) Days., Disp: 2 mL, Rfl: 1  •  Tirzepatide (Mounjaro) 5 MG/0.5ML solution pen-injector, Inject 0.5 mL under the skin into the appropriate area as directed 1 (One) Time Per Week., Disp: 2 mL, Rfl: 1  •  triamterene-hydrochlorothiazide (MAXZIDE-25) 37.5-25 MG per tablet, Take 1 tablet by mouth Daily., Disp: 90 tablet, Rfl: 1  •  allopurinol (ZYLOPRIM) 100 MG tablet, TAKE 1 TABLET BY MOUTH EVERY DAY, Disp: 90 tablet, Rfl: 0  •  dapagliflozin (FARXIGA) 5 MG tablet tablet, Take 1 tablet by mouth Daily., Disp: 30 tablet, Rfl: 5  •  pravastatin (Pravachol) 20 MG tablet, Take 1 tablet by mouth Daily for 90 days., Disp: 90 tablet, Rfl: 1  •  Tirzepatide (Mounjaro) 2.5 MG/0.5ML solution pen-injector, Inject 2.5 mg under the skin into the appropriate area as directed 1 (One) Time Per Week., Disp: 2 mL, Rfl: 0    Allergies   Allergen Reactions   • Bactrim [Sulfamethoxazole-Trimethoprim] Other (See Comments)     HOT FLASH/ BURNING INSIDE   • Penicillins Hives       Social History     Tobacco Use   • Smoking status: Former     Packs/day: 1.00     Years: 5.00     Pack years: 5.00     Types: Cigarettes     Start date:      Quit date:      Years since quittin.0   • Smokeless tobacco: Never   Substance Use Topics   • Alcohol use: Not Currently     Comment: HX OF ALCOHOLISM        Review of Systems   Constitutional: Negative for fever.   Gastrointestinal: Negative for abdominal " "pain, change in bowel habit and vomiting.   Genitourinary: Positive for decreased urine volume (hesitancy), difficulty urinating, dysuria and flank pain. Negative for discharge, hematuria, penile swelling, scrotal swelling and testicular pain.   Musculoskeletal: Positive for back pain.       Objective   Vitals:    01/26/23 1100   BP: 140/88   BP Location: Right arm   Patient Position: Sitting   Cuff Size: Large Adult   Pulse: 91   Resp: 18   Temp: 98.5 °F (36.9 °C)   TempSrc: Temporal   SpO2: 97%   Weight: (!) 162 kg (358 lb)   Height: 182.9 cm (72\")     Body mass index is 48.55 kg/m².    Physical Exam  Constitutional:       General: He is not in acute distress.     Appearance: Normal appearance. He is well-developed. He is obese. He is not ill-appearing or diaphoretic.   HENT:      Head: Normocephalic and atraumatic.   Eyes:      General: No scleral icterus.        Right eye: No discharge.         Left eye: No discharge.      Extraocular Movements: Extraocular movements intact.      Conjunctiva/sclera: Conjunctivae normal.   Cardiovascular:      Rate and Rhythm: Normal rate and regular rhythm.      Heart sounds: Normal heart sounds. No murmur heard.  Pulmonary:      Effort: Pulmonary effort is normal.      Breath sounds: Normal breath sounds.   Abdominal:      Palpations: Abdomen is soft.      Tenderness: There is no left CVA tenderness.   Genitourinary:     Comments: Exam deferred  Musculoskeletal:      Cervical back: Normal range of motion and neck supple.      Right lower leg: No edema.      Left lower leg: No edema.   Skin:     General: Skin is warm.      Capillary Refill: Capillary refill takes less than 2 seconds.      Findings: No rash.   Neurological:      General: No focal deficit present.      Mental Status: He is alert.      Cranial Nerves: No cranial nerve deficit.   Psychiatric:         Mood and Affect: Mood normal.         Behavior: Behavior normal.         Thought Content: Thought content normal. "         Brief Urine Lab Results  (Last result in the past 365 days)      Color   Clarity   Blood   Leuk Est   Nitrite   Protein   CREAT   Urine HCG        01/26/23 1108 Yellow   Cloudy   Negative   Trace   Negative   Negative                 Assessment & Plan     Diagnoses and all orders for this visit:    1. Dysuria (Primary)  -     POC Urinalysis Dipstick  -     Urine Culture - Urine, Urine, Clean Catch; Future  -     Urine Culture - Urine, Urine, Clean Catch    2. Acute left flank pain  -     cyclobenzaprine (FLEXERIL) 5 MG tablet; Take 1 tablet by mouth Daily As Needed for Muscle Spasms.  Dispense: 12 tablet; Refill: 0    3. History of kidney stones    4. Glucose intolerance (impaired glucose tolerance)  -     Tirzepatide (Mounjaro) 5 MG/0.5ML solution pen-injector; Inject 0.5 mL under the skin into the appropriate area as directed 1 (One) Time Per Week.  Dispense: 2 mL; Refill: 0  -     Lipid Panel  -     Comprehensive Metabolic Panel  -     Hemoglobin A1c    5. Hypercholesteremia  -     pravastatin (Pravachol) 20 MG tablet; Take 1 tablet by mouth Daily for 90 days.  Dispense: 90 tablet; Refill: 0  -     Lipid Panel  -     Comprehensive Metabolic Panel    6. Hypogonadism in male  -     Testosterone Cypionate (DEPOTESTOTERONE CYPIONATE) 200 MG/ML injection; Inject 1 mL into the appropriate muscle as directed by prescriber Every 14 (Fourteen) Days.  Dispense: 2 mL; Refill: 0  -     CBC & Differential  -     TSH  -     Lipid Panel  -     Comprehensive Metabolic Panel    7. Morbid obesity due to excess calories (HCC)      Due to patient's concern of kidney stone, I offered patient tamsulosin (and an antibiotic, as he indicates this has been prescribed in the past for kidney stones).  Patient reports anxiety related to any new medication he is prescribed, and he does decline offered treatment.  Imaging also declined.  He request a muscle relaxer (cyclobenzaprine) and will take over-the-counter Tylenol and ibuprofen  for pain.  I advised reduced caffeine intake and report to the emergency department for imaging should symptoms worsen.  Of note, patient has discontinued allopurinol (history of hyperuricemia, elevated uric acid level on prior labs).      Patient is requesting dose increase of Mounjaro.  Dosages was increased at patient request.  Prior A1c's available for review in the chart are all in the prediabetic range.  A1c was updated with the blood draw today.  It appears that this is being prescribed for weight loss.    Refills of testosterone given.  Prior testosterone levels in the normal range on testosterone supplementation.  Consider repeating PSA level with next blood draw.    Labs ordered by his primary care provider were drawn today.    Commend follow-up in 3 months, or sooner if needed.    Patient was given instructions and counseling regarding his/her condition or for health maintenance advice. Please see specific information pulled into the AVS if appropriate.     I wore protective equipment throughout this patient encounter to include mask. Hand hygiene was performed before donning protective equipment and after removal when leaving the room.

## 2023-01-26 NOTE — PROGRESS NOTES
Venipuncture Blood Specimen Collection  Venipuncture performed in R ARM by Enid Kelsey MA with good hemostasis. Patient tolerated the procedure well without complications.   01/26/23   Enid Kelsey MA

## 2023-01-27 LAB
ALBUMIN SERPL-MCNC: 4.7 G/DL (ref 3.5–5.2)
ALBUMIN/GLOB SERPL: 2 G/DL
ALP SERPL-CCNC: 72 U/L (ref 39–117)
ALT SERPL W P-5'-P-CCNC: 21 U/L (ref 1–41)
ANION GAP SERPL CALCULATED.3IONS-SCNC: 10 MMOL/L (ref 5–15)
AST SERPL-CCNC: 15 U/L (ref 1–40)
BASOPHILS # BLD AUTO: 0.03 10*3/MM3 (ref 0–0.2)
BASOPHILS NFR BLD AUTO: 0.4 % (ref 0–1.5)
BILIRUB SERPL-MCNC: 0.4 MG/DL (ref 0–1.2)
BUN SERPL-MCNC: 14 MG/DL (ref 6–20)
BUN/CREAT SERPL: 15.6 (ref 7–25)
CALCIUM SPEC-SCNC: 9.7 MG/DL (ref 8.6–10.5)
CHLORIDE SERPL-SCNC: 98 MMOL/L (ref 98–107)
CHOLEST SERPL-MCNC: 222 MG/DL (ref 0–200)
CO2 SERPL-SCNC: 30 MMOL/L (ref 22–29)
CREAT SERPL-MCNC: 0.9 MG/DL (ref 0.76–1.27)
DEPRECATED RDW RBC AUTO: 38.8 FL (ref 37–54)
EGFRCR SERPLBLD CKD-EPI 2021: 112.1 ML/MIN/1.73
EOSINOPHIL # BLD AUTO: 0.11 10*3/MM3 (ref 0–0.4)
EOSINOPHIL NFR BLD AUTO: 1.6 % (ref 0.3–6.2)
ERYTHROCYTE [DISTWIDTH] IN BLOOD BY AUTOMATED COUNT: 13.2 % (ref 12.3–15.4)
GLOBULIN UR ELPH-MCNC: 2.4 GM/DL
GLUCOSE SERPL-MCNC: 62 MG/DL (ref 65–99)
HCT VFR BLD AUTO: 44.5 % (ref 37.5–51)
HDLC SERPL-MCNC: 39 MG/DL (ref 40–60)
HGB BLD-MCNC: 15.2 G/DL (ref 13–17.7)
IMM GRANULOCYTES # BLD AUTO: 0.16 10*3/MM3 (ref 0–0.05)
IMM GRANULOCYTES NFR BLD AUTO: 2.3 % (ref 0–0.5)
LDLC SERPL CALC-MCNC: 119 MG/DL (ref 0–100)
LDLC/HDLC SERPL: 2.82 {RATIO}
LYMPHOCYTES # BLD AUTO: 1.72 10*3/MM3 (ref 0.7–3.1)
LYMPHOCYTES NFR BLD AUTO: 24.4 % (ref 19.6–45.3)
MCH RBC QN AUTO: 27.4 PG (ref 26.6–33)
MCHC RBC AUTO-ENTMCNC: 34.2 G/DL (ref 31.5–35.7)
MCV RBC AUTO: 80.2 FL (ref 79–97)
MONOCYTES # BLD AUTO: 0.43 10*3/MM3 (ref 0.1–0.9)
MONOCYTES NFR BLD AUTO: 6.1 % (ref 5–12)
NEUTROPHILS NFR BLD AUTO: 4.6 10*3/MM3 (ref 1.7–7)
NEUTROPHILS NFR BLD AUTO: 65.2 % (ref 42.7–76)
NRBC BLD AUTO-RTO: 0 /100 WBC (ref 0–0.2)
PLATELET # BLD AUTO: 248 10*3/MM3 (ref 140–450)
PMV BLD AUTO: 9.6 FL (ref 6–12)
POTASSIUM SERPL-SCNC: 4.1 MMOL/L (ref 3.5–5.2)
PROT SERPL-MCNC: 7.1 G/DL (ref 6–8.5)
RBC # BLD AUTO: 5.55 10*6/MM3 (ref 4.14–5.8)
SODIUM SERPL-SCNC: 138 MMOL/L (ref 136–145)
TRIGL SERPL-MCNC: 366 MG/DL (ref 0–150)
TSH SERPL DL<=0.05 MIU/L-ACNC: 1.01 UIU/ML (ref 0.27–4.2)
VLDLC SERPL-MCNC: 64 MG/DL (ref 5–40)
WBC NRBC COR # BLD: 7.05 10*3/MM3 (ref 3.4–10.8)

## 2023-01-28 LAB — BACTERIA SPEC AEROBE CULT: NO GROWTH

## 2023-01-30 ENCOUNTER — TELEPHONE (OUTPATIENT)
Dept: FAMILY MEDICINE CLINIC | Facility: CLINIC | Age: 38
End: 2023-01-30
Payer: COMMERCIAL

## 2023-01-30 DIAGNOSIS — E11.65 TYPE 2 DIABETES MELLITUS WITH HYPERGLYCEMIA, WITHOUT LONG-TERM CURRENT USE OF INSULIN: ICD-10-CM

## 2023-01-30 DIAGNOSIS — I10 ESSENTIAL HYPERTENSION: ICD-10-CM

## 2023-01-30 DIAGNOSIS — E66.01 MORBID OBESITY DUE TO EXCESS CALORIES: Primary | ICD-10-CM

## 2023-02-07 DIAGNOSIS — E78.2 MIXED HYPERLIPIDEMIA: Primary | ICD-10-CM

## 2023-02-07 RX ORDER — PRAVASTATIN SODIUM 40 MG
40 TABLET ORAL DAILY
Qty: 90 TABLET | Refills: 1 | Status: SHIPPED | OUTPATIENT
Start: 2023-02-07

## 2023-02-08 NOTE — TELEPHONE ENCOUNTER
I called and spoke to patient. I informed that the referral for bariatric surgery has been placed and to be in contact with their office. Patient voiced underdstanding.     Patient said that he will call around the different pharmacies. He said that he has been out of the 2.5 mg dosage of the Mounjaro for 2 weeks. Patient was wanting to know if starting this 5 mg dosage would affect anything.

## 2023-02-08 NOTE — TELEPHONE ENCOUNTER
Called and informed patient that starting the 5 mg of Mounjaro is fine. He could experience some nausea with the first dosage. Pt voiced understanding and will get back with us when he finds a pharmacy.

## 2023-02-21 DIAGNOSIS — I10 ESSENTIAL HYPERTENSION: ICD-10-CM

## 2023-02-21 RX ORDER — TRIAMTERENE AND HYDROCHLOROTHIAZIDE 37.5; 25 MG/1; MG/1
TABLET ORAL
Qty: 30 TABLET | Refills: 1 | Status: SHIPPED | OUTPATIENT
Start: 2023-02-21 | End: 2023-02-23

## 2023-02-22 DIAGNOSIS — I10 ESSENTIAL HYPERTENSION: ICD-10-CM

## 2023-02-23 RX ORDER — TRIAMTERENE AND HYDROCHLOROTHIAZIDE 37.5; 25 MG/1; MG/1
TABLET ORAL
Qty: 30 TABLET | Refills: 1 | Status: SHIPPED | OUTPATIENT
Start: 2023-02-23

## 2023-02-27 ENCOUNTER — TELEPHONE (OUTPATIENT)
Dept: FAMILY MEDICINE CLINIC | Facility: CLINIC | Age: 38
End: 2023-02-27

## 2023-02-27 DIAGNOSIS — N52.9 ERECTILE DYSFUNCTION, UNSPECIFIED ERECTILE DYSFUNCTION TYPE: Primary | ICD-10-CM

## 2023-02-27 NOTE — TELEPHONE ENCOUNTER
Called and spoke to patient to clear up the requests. Pt stated that he started doing internment fasting about a month ago. Has lost about 30 lbs and his b/p has dropped and returned to normal, rather than being high all the time. Pt stated he is feeling great but is now experiencing erectile dysfunction. That is why patient is requesting cialis or viagra.

## 2023-02-27 NOTE — TELEPHONE ENCOUNTER
Caller: Stephon Martini    Relationship to patient: Self    Best call back number:     Patient is needing: PATIENT STATES HE HAS LOST ALMOST 30 POUNDS THIS MONTH AND HIS BLOOD PRESSURE HAS DROPPED AND HE NEEDS SOME HELP SO IS REQUESTING THAT MARIBEL YU PLEASE CALL IN CIALIS OR Ctrax.   PATIENT STATES THAT HIS BP IS PRETTY STABLE NOW.   HE STATES HE EITHER GOT A SAMPLE FROM [facility] OR SHE HAD PRESCRIBED ONE OF THE 2 MEDICATIONS FOR HIM.  PLEASE CALL PATIENT TO ADVISE IF THIS WILL BE CALLED INTO [facility] [employer].

## 2023-02-28 RX ORDER — SILDENAFIL 25 MG/1
25 TABLET, FILM COATED ORAL DAILY PRN
Qty: 9 TABLET | Refills: 1 | Status: SHIPPED | OUTPATIENT
Start: 2023-02-28 | End: 2023-06-26 | Stop reason: SDUPTHER

## 2023-02-28 NOTE — TELEPHONE ENCOUNTER
Hub is instructed to read the documentation below to patient     LVM for patient. Please advise below.    Can you please reach out to patient and let him know I did go ahead and reorder Viagra.  This was ordered last for him by Cristel as he stated.  If this dose does not work or patient has any questions please remind patient that it is best to do a face-to-face visit for any medication changes.  Thank you so much.

## 2023-03-19 DIAGNOSIS — I10 ESSENTIAL HYPERTENSION: ICD-10-CM

## 2023-03-20 RX ORDER — LISINOPRIL 20 MG/1
TABLET ORAL
Qty: 30 TABLET | Refills: 1 | Status: SHIPPED | OUTPATIENT
Start: 2023-03-20

## 2023-04-03 DIAGNOSIS — F41.9 ANXIETY: ICD-10-CM

## 2023-04-03 NOTE — TELEPHONE ENCOUNTER
Caller: Stephon Martini    Relationship: Self    Best call back number: [phone]    Requested Prescriptions:   Requested Prescriptions     Pending Prescriptions Disp Refills   • clonazePAM (KlonoPIN) 0.25 MG disintegrating tablet 60 tablet 0     Sig: Place 1 tablet on the tongue 2 (Two) Times a Day As Needed for Anxiety.        Pharmacy where request should be sent: [facility] [id number] [address] - [phone]  - [phone] FX     Last office visit with prescribing clinician: 9/19/2022   Last telemedicine visit with prescribing clinician: Visit date not found   Next office visit with prescribing clinician: Visit date not found     Additional details provided by patient: HAS 0NE LEFT     Does the patient have less than a 3 day supply:  [x] Yes  [] No    Would you like a call back once the refill request has been completed: [] Yes [x] No    If the office needs to give you a call back, can they leave a voicemail: [] Yes [x] No    Umang Nieves   04/03/23 14:41 EDT

## 2023-04-04 RX ORDER — CLONAZEPAM 0.25 MG/1
0.25 TABLET, ORALLY DISINTEGRATING ORAL 2 TIMES DAILY PRN
Qty: 60 TABLET | Refills: 0 | OUTPATIENT
Start: 2023-04-04

## 2023-04-04 NOTE — TELEPHONE ENCOUNTER
Rx Refill Note    PROTOCOLS NOT MET     Requested Prescriptions     Pending Prescriptions Disp Refills   • clonazePAM (KlonoPIN) 0.25 MG disintegrating tablet 60 tablet 0     Si tablet 2 (Two) Times a Day As Needed for Anxiety.      Last office visit with prescribing clinician: 2022      Next office visit with prescribing clinician: Visit date not found     INSPECT - SCAN - [facility] DOMENIC ALONSO (2023)         Jeannie Busby MA  23, 09:41 EDT

## 2023-04-04 NOTE — TELEPHONE ENCOUNTER
Need an appointment first.  Patient has not refilled 4 months.  This med is no longer recommended for patient we can discuss alternative options.

## 2023-04-05 DIAGNOSIS — F41.9 ANXIETY: ICD-10-CM

## 2023-04-05 DIAGNOSIS — R10.9 ACUTE LEFT FLANK PAIN: ICD-10-CM

## 2023-04-05 RX ORDER — CYCLOBENZAPRINE HCL 5 MG
5 TABLET ORAL DAILY PRN
Qty: 12 TABLET | Refills: 0 | Status: SHIPPED | OUTPATIENT
Start: 2023-04-05

## 2023-04-05 RX ORDER — CLONAZEPAM 0.25 MG/1
TABLET, ORALLY DISINTEGRATING ORAL
Qty: 60 TABLET | OUTPATIENT
Start: 2023-04-05

## 2023-04-05 NOTE — TELEPHONE ENCOUNTER
Rx Refill Note  Requested Prescriptions     Pending Prescriptions Disp Refills   • cyclobenzaprine (FLEXERIL) 5 MG tablet 12 tablet 0     Sig: Take 1 tablet by mouth Daily As Needed for Muscle Spasms.      Last office visit with prescribing clinician: 9/19/2022   Last telemedicine visit with prescribing clinician: Visit date not found   Next office visit with prescribing clinician: Visit date not found                         Would you like a call back once the refill request has been completed: [] Yes [] No    If the office needs to give you a call back, can they leave a voicemail: [] Yes [] No    Florin Nair Rep  04/05/23, 10:34 EDT

## 2023-04-05 NOTE — TELEPHONE ENCOUNTER
"Hub is instructed to read the documentation below to patient    Please see note directly from provider:    \"Patient has not filled clonazepam since December 8.  This drug is not intended as an as needed only drug but it was a daily drug.  If patient has been able to go that long without it then we need to discuss alternative options that are not a benzodiazepine as it is not recommended long-term to have benzodiazepines anyway.  I would like to see patient in clinic prior to refilling this so that we can discuss alternative options. Need an appointment first.  Patient has not refilled 4 months.  This med is no longer recommended for patient we can discuss alternative options.\"      "

## 2023-04-12 ENCOUNTER — TELEPHONE (OUTPATIENT)
Dept: FAMILY MEDICINE CLINIC | Facility: CLINIC | Age: 38
End: 2023-04-12

## 2023-04-12 NOTE — TELEPHONE ENCOUNTER
Caller: Stephon Martini    Relationship to patient: Self    Best call back number: [phone]    Chief complaint: MEDICATION REVIEW    Type of visit: OFFICE VISIT    Requested date: ASAP    If rescheduling, when is the original appointment: 5/15/23    Additional notes: PATIENT REQUESTED A REFILL OF HIS CLONAZEPAM AND IT WAS DENIED BY MARIBEL AND MARIBEL ADVISED PATIENT TO SCHEDULE APPOINTMENT TO DISCUSS MEDICATION. MAY 15 WAS FIRST AVAILABLE IN [facility] BUT PATIENT IS NEEDING TO BE SEEN ASAP TO DISCUSS ALTERNATIVES.    PLEASE ADVISE

## 2023-05-24 DIAGNOSIS — I10 ESSENTIAL HYPERTENSION: ICD-10-CM

## 2023-05-24 RX ORDER — LISINOPRIL 20 MG/1
TABLET ORAL
Qty: 30 TABLET | Refills: 1 | Status: SHIPPED | OUTPATIENT
Start: 2023-05-24

## 2023-06-08 DIAGNOSIS — E29.1 HYPOGONADISM IN MALE: ICD-10-CM

## 2023-06-08 NOTE — TELEPHONE ENCOUNTER
Rx Refill Note  Requested Prescriptions     Pending Prescriptions Disp Refills    Testosterone Cypionate (DEPOTESTOTERONE CYPIONATE) 200 MG/ML injection 2 mL 0     Sig: Inject 1 mL into the appropriate muscle as directed by prescriber Every 14 (Fourteen) Days.      Last office visit with prescribing clinician: 9/19/2022   Last telemedicine visit with prescribing clinician: Visit date not found   Next office visit with prescribing clinician: 6/23/2023                         Would you like a call back once the refill request has been completed: [] Yes [] No    If the office needs to give you a call back, can they leave a voicemail: [] Yes [] No    Enid Kelsey MA  06/08/23, 16:30 EDT

## 2023-06-09 RX ORDER — TESTOSTERONE CYPIONATE 200 MG/ML
200 INJECTION, SOLUTION INTRAMUSCULAR
Qty: 2 ML | Refills: 0 | Status: SHIPPED | OUTPATIENT
Start: 2023-06-09

## 2023-06-19 DIAGNOSIS — I10 ESSENTIAL HYPERTENSION: ICD-10-CM

## 2023-06-19 RX ORDER — LISINOPRIL 20 MG/1
TABLET ORAL
Qty: 30 TABLET | Refills: 1 | Status: SHIPPED | OUTPATIENT
Start: 2023-06-19

## 2023-07-06 ENCOUNTER — TELEPHONE (OUTPATIENT)
Dept: FAMILY MEDICINE CLINIC | Age: 38
End: 2023-07-06

## 2023-07-06 NOTE — TELEPHONE ENCOUNTER
Caller: Stephon Martini    Relationship: Self    Best call back number:     What is the best time to reach you:     Who are you requesting to speak with (clinical staff, provider,  specific staff member):     Do you know the name of the person who called:     What was the call regarding: PATIENT IS CALLING IN STATING THAT HIS TESTOSTERONE LEVELS ARE LOW AND HE WANTS TO BE CALLED BACK TO DISCUSS THIS WITH DR YU.     Is it okay if the provider responds through MyChart:

## 2023-07-24 DIAGNOSIS — I10 ESSENTIAL HYPERTENSION: ICD-10-CM

## 2023-07-24 RX ORDER — LISINOPRIL 20 MG/1
20 TABLET ORAL DAILY
Qty: 30 TABLET | Refills: 1 | Status: SHIPPED | OUTPATIENT
Start: 2023-07-24

## 2023-07-24 RX ORDER — TRIAMTERENE AND HYDROCHLOROTHIAZIDE 37.5; 25 MG/1; MG/1
1 TABLET ORAL DAILY
Qty: 30 TABLET | Refills: 1 | Status: SHIPPED | OUTPATIENT
Start: 2023-07-24

## 2023-07-24 NOTE — TELEPHONE ENCOUNTER
KIKI: PT IS REQUESTING A 90 DAY SUPPLY     Rx Refill Note  Requested Prescriptions     Pending Prescriptions Disp Refills    lisinopril (PRINIVIL,ZESTRIL) 20 MG tablet 30 tablet 1     Sig: Take 1 tablet by mouth Daily.      Last office visit with prescribing clinician: 6/23/2023   Last telemedicine visit with prescribing clinician: Visit date not found   Next office visit with prescribing clinician: 9/29/2023                         Would you like a call back once the refill request has been completed: [] Yes [] No    If the office needs to give you a call back, can they leave a voicemail: [] Yes [] No    Florin Nair Rep  07/24/23, 08:44 EDT

## 2023-08-03 DIAGNOSIS — E29.1 HYPOGONADISM IN MALE: ICD-10-CM

## 2023-08-04 DIAGNOSIS — I10 ESSENTIAL HYPERTENSION: ICD-10-CM

## 2023-08-04 RX ORDER — TESTOSTERONE CYPIONATE 200 MG/ML
INJECTION, SOLUTION INTRAMUSCULAR
Qty: 2 ML | Refills: 2 | Status: SHIPPED | OUTPATIENT
Start: 2023-08-04

## 2023-08-04 RX ORDER — LISINOPRIL 20 MG/1
20 TABLET ORAL DAILY
Qty: 90 TABLET | Refills: 0 | Status: SHIPPED | OUTPATIENT
Start: 2023-08-04

## 2023-08-04 RX ORDER — TRIAMTERENE AND HYDROCHLOROTHIAZIDE 37.5; 25 MG/1; MG/1
1 TABLET ORAL DAILY
Qty: 90 TABLET | Refills: 1 | Status: SHIPPED | OUTPATIENT
Start: 2023-08-04

## 2023-08-17 ENCOUNTER — TELEPHONE (OUTPATIENT)
Dept: FAMILY MEDICINE CLINIC | Facility: CLINIC | Age: 38
End: 2023-08-17

## 2023-08-17 NOTE — TELEPHONE ENCOUNTER
DELETE AFTER REVIEWING: Telephone encounter to be sent to the clinical pool     Caller: Stephon Martini    Relationship: Self    Best call back number:     [phone] (Mobile)       What medication are you requesting:     What are your current symptoms: EYES WATERY   BLURRY VISION, AND MATTED TOGETHER     How long have you been experiencing symptoms: 1 DAY     Have you had these symptoms before:    [] Yes  [x] No    Have you been treated for these symptoms before:   [] Yes  [x] No    If a prescription is needed, what is your preferred pharmacy and phone number: [facility] [id number] [place], [place] - [facility] - [phone]  - [phone] FX     Additional notes:

## 2023-09-01 ENCOUNTER — HOSPITAL ENCOUNTER (EMERGENCY)
Facility: HOSPITAL | Age: 38
Discharge: HOME | End: 2023-09-01
Attending: EMERGENCY MEDICINE
Payer: COMMERCIAL

## 2023-09-01 ENCOUNTER — APPOINTMENT (OUTPATIENT)
Dept: GENERAL RADIOLOGY | Facility: HOSPITAL | Age: 38
End: 2023-09-01
Payer: COMMERCIAL

## 2023-09-01 VITALS
TEMPERATURE: 98.3 F | DIASTOLIC BLOOD PRESSURE: 85 MMHG | HEIGHT: 73 IN | WEIGHT: 310 LBS | RESPIRATION RATE: 20 BRPM | BODY MASS INDEX: 41.08 KG/M2 | SYSTOLIC BLOOD PRESSURE: 140 MMHG | OXYGEN SATURATION: 98 % | HEART RATE: 79 BPM

## 2023-09-01 DIAGNOSIS — M25.572 LEFT ANKLE PAIN, UNSPECIFIED CHRONICITY: ICD-10-CM

## 2023-09-01 DIAGNOSIS — M79.672 LEFT FOOT PAIN: Primary | ICD-10-CM

## 2023-09-01 PROCEDURE — 73630 X-RAY EXAM OF FOOT: CPT

## 2023-09-01 PROCEDURE — 73610 X-RAY EXAM OF ANKLE: CPT

## 2023-09-01 PROCEDURE — 99283 EMERGENCY DEPT VISIT LOW MDM: CPT

## 2023-09-01 PROCEDURE — 97162 PT EVAL MOD COMPLEX 30 MIN: CPT

## 2023-09-01 RX ORDER — IBUPROFEN 800 MG/1
800 TABLET ORAL EVERY 6 HOURS PRN
Qty: 20 TABLET | Refills: 0 | Status: SHIPPED | OUTPATIENT
Start: 2023-09-01

## 2023-09-01 NOTE — ED PROVIDER NOTES
Subjective   History of Present Illness  Chief Complaint: Foot pain    Patient is a 37-year-old  male history of anxiety, hypertension presents to the ER with complaints of left foot and ankle pain for a few days.  Patient states that he was dead lifting when he felt a pop in the left foot.  He denies any fall, states that he did not roll his ankle or injured in any other way.  He states that his pain is minimal initially but after that pain became much worse and more severe.  He describes as a constant throbbing pain that he currently rates an 8/10, states that he cannot bear weight on it due to the pain.  No numbness or tingling.  Denies any swelling or redness.  No chest pain shortness of breath headache or fever or chills.    PCP: Shanon De Guzman    History provided by:  Patient    Review of Systems   Constitutional:  Negative for chills and fever.   HENT:  Negative for sore throat and trouble swallowing.    Eyes: Negative.    Respiratory:  Negative for shortness of breath and wheezing.    Cardiovascular:  Negative for chest pain.   Gastrointestinal:  Negative for abdominal pain, diarrhea, nausea and vomiting.   Endocrine: Negative.    Genitourinary:  Negative for dysuria.   Musculoskeletal:  Positive for arthralgias. Negative for joint swelling and myalgias.   Skin:  Negative for rash.   Allergic/Immunologic: Negative.    Neurological:  Negative for headaches.   Psychiatric/Behavioral:  Negative for behavioral problems.    All other systems reviewed and are negative.    Past Medical History:   Diagnosis Date    Anxiety     Asthma     Carpal tunnel syndrome, bilateral     COVID-19     Gout 09/2022    Hyperlipidemia     Hypertension     Kidney stone     Low testosterone in male     Obesity     FANTA (obstructive sleep apnea)        Allergies   Allergen Reactions    Bactrim [Sulfamethoxazole-Trimethoprim] Other (See Comments)     HOT FLASH/ BURNING INSIDE    Penicillins Hives       No past surgical  history on file.    Family History   Problem Relation Age of Onset    Heart attack Mother     Hyperlipidemia Father     Hypertension Father     Stroke Father     Hyperthyroidism Sister     Parkinsonism Maternal Grandfather     Breast cancer Paternal Grandmother     Stroke Paternal Grandfather     Nephrolithiasis Paternal Grandfather        Social History     Socioeconomic History    Marital status:    Tobacco Use    Smoking status: Former     Packs/day: 1.00     Years: 5.00     Pack years: 5.00     Types: Cigarettes     Start date:      Quit date:      Years since quittin.6    Smokeless tobacco: Never   Vaping Use    Vaping Use: Never used   Substance and Sexual Activity    Alcohol use: Not Currently     Comment: HX OF ALCOHOLISM     Drug use: Never    Sexual activity: Defer           Objective   Physical Exam  Vitals and nursing note reviewed.   Constitutional:       Appearance: Normal appearance.   HENT:      Mouth/Throat:      Mouth: Mucous membranes are moist.   Eyes:      Extraocular Movements: Extraocular movements intact.      Pupils: Pupils are equal, round, and reactive to light.   Cardiovascular:      Rate and Rhythm: Normal rate and regular rhythm.      Pulses: Normal pulses.      Heart sounds: Normal heart sounds.   Pulmonary:      Effort: Pulmonary effort is normal.      Breath sounds: Normal breath sounds.   Musculoskeletal:         General: Tenderness present. No swelling.      Comments: Pedal pulses present 2+ bilaterally  Tenderness to palpation the dorsal aspect of the left foot and slightly at the medial malleolus.  No obvious swelling or deformity   Skin:     General: Skin is warm.      Capillary Refill: Capillary refill takes less than 2 seconds.      Findings: No erythema.   Neurological:      General: No focal deficit present.      Mental Status: He is alert.   Psychiatric:         Mood and Affect: Mood normal.         Behavior: Behavior normal.       Procedures      "      ED Course    /85 (BP Location: Left arm, Patient Position: Sitting)   Pulse 79   Temp 98.3 °F (36.8 °C) (Oral)   Resp 20   Ht 185.4 cm (73\")   Wt (!) 141 kg (310 lb)   SpO2 98%   BMI 40.90 kg/m²   Labs Reviewed - No data to display  Medications - No data to display  XR Ankle 3+ View Left    Result Date: 9/1/2023  Impression: Left ankle: Negative for fracture. Left foot: Negative for fracture. Electronically Signed: Miguel A March MD  9/1/2023 1:16 PM EDT  Workstation ID: [id number]    XR Foot 3+ View Left    Result Date: 9/1/2023  Impression: Left ankle: Negative for fracture. Left foot: Negative for fracture. Electronically Signed: Miguel A March MD  9/1/2023 1:16 PM EDT  Workstation ID: [id number]                                          Medical Decision Making  Differential Dx (Includes but not limited to): Fracture contusion dislocation sprain strain  Medical Records Reviewed: No pertinent records to review  Labs: N/A  Imaging: On my interpretation no obvious fracture in the foot or ankle  Telemetry: N/A  Testing considered but not ordered: CT head patient denies headache or head injury  Nature of Complaint: Acute  Admission vs Discharge: Discharge  Discussion: While in the ED appropriate PPE was worn during exam and throughout all encounters with the patient.  Patient had the above evaluation.  Patient offered pain medication, he declined.  X-ray imaging unremarkable.  PT consult was ordered, patient was evaluated in was recommended outpatient treatment as well as RICE therapy.  Patient placed in cam walker boot for comfort and does report feeling better with boot in place.  Patient neurovascular tact distally post boot placement.  Patient also discharged with prescription for crutches patient to decrease weightbearing for few days and increase activity as tolerated.  Recommended PCP and Ortho follow-up.    Discharge plan and instructions were discussed with the patient who verbalized understanding " and is in agreement with the plan, all questions were answered at this time.  Patient is aware of signs symptoms that would require immediate return to the emergency room.  Patient understands importance of following up with primary care provider for further evaluation and worsening concerns as well as blood pressure recheck in the next 4 weeks.    Patient was discharged in improved stable condition with an upright steady gait.    Patient is aware that discharge does not mean that nothing is wrong but indicates no emergencies present and they must continue care with follow-up as given below or physician of their choice.    Problems Addressed:  Left ankle pain, unspecified chronicity: acute illness or injury  Left foot pain: acute illness or injury    Amount and/or Complexity of Data Reviewed  Radiology: ordered. Decision-making details documented in ED Course.    Risk  Prescription drug management.        Final diagnoses:   Left foot pain   Left ankle pain, unspecified chronicity       ED Disposition  ED Disposition       ED Disposition   Discharge    Condition   Stable    Comment   --               Stephon Young, APRN  [address] [facility] [place] [address] [phone]    Schedule an appointment as soon as possible for a visit in 2 days  As needed, If symptoms worsen    Oleg Gonzalez II, DO  [address] [place] [address] [place] [id number] [phone]    Schedule an appointment as soon as possible for a visit in 2 days  As needed, If symptoms worsen         Medication List        New Prescriptions      ibuprofen 800 MG tablet  Commonly known as: ADVIL,MOTRIN  Take 1 tablet by mouth Every 6 (Six) Hours As Needed for Mild Pain, Moderate Pain or Fever for up to 30 doses.               Where to Get Your Medications        These medications were sent to [facility] [id number] - [place], [place] - [address] [place] [address] - [phone]  - [phone] [facility] [facility],  [facility] [place] [address]      Phone: [phone]   ibuprofen 800 MG tablet            Arlene Maldonado PA  09/01/23 6555

## 2023-09-01 NOTE — CASE MANAGEMENT/SOCIAL WORK
"Continued Stay Note  [facility]     Patient Name: Stephon Martini  MRN: [id number]  Today's Date: 9/1/2023    Admit Date: 9/1/2023    Plan: Home   Discharge Plan       Row Name 09/01/23 1525       Plan    Plan Home    Plan Comments Notified by PT Hayes that patient would benefit from outpatient PT starting in about 2 weeks.Pt discharged prior to being seen by .  contacted patient to discuss outpatient rehab. Pt states he wants to \"see how I feel in about a week\". He states he will follow-up with his pcp if he decides he wants outpatient therapy. He declined for  to make any arrangements at this time. CAITLIN Maldonado informed.                   Discharge Codes    No documentation.                 Nicole Silvestre RN, [facility]  Office: [phone]  Fax: [phone]  [email or web address]      Phone communication or documentation only - no physical contact with patient or family.      Nicole Silvestre RN    "

## 2023-09-01 NOTE — DISCHARGE INSTRUCTIONS
Take Motrin as needed for pain.  Wear boot for comfort and protection.  Use crutches for ambulation, decrease weightbearing for the next 2 to 3 days and increase activity as tolerated.    Rest ice elevate for pain relief.  Apply ice in 15 to 20-minute increments.    Follow-up with PCP and Ortho for recheck.    Return to the ER for new or worsening symptoms

## 2023-09-01 NOTE — THERAPY EVALUATION
Patient Name: Stephon Martini  : 1985    MRN: [id number]                              Today's Date: 2023       Admit Date: 2023    Visit Dx:     ICD-10-CM ICD-9-CM   1. Left foot pain  M79.672 729.5   2. Left ankle pain, unspecified chronicity  M25.572 719.47     Patient Active Problem List   Diagnosis    Allergic rhinitis    HTN (hypertension)    Morbid obesity due to excess calories    Type 2 diabetes mellitus with hyperglycemia, without long-term current use of insulin    Anxiety    Hypogonadism in male    FANTA on CPAP    Prediabetes    Mixed hyperlipidemia    Vitamin D deficiency     Past Medical History:   Diagnosis Date    Anxiety     Asthma     Carpal tunnel syndrome, bilateral     COVID-19     Gout 2022    Hyperlipidemia     Hypertension     Kidney stone     Low testosterone in male     Obesity     FANTA (obstructive sleep apnea)      History: Pt is a 38 y/o M admitted to [facility] on 23 with complaints of severe L foot pain beginning earlier this week when performing deadlift at the gym. Pt describes the incident as hearing a pop during the deadlift maneuver, but being able to continue workout. He was able to resume daily life until he was unable to ambulate this morning due to severe pain described as 10/10 with WB. XR of L Foot/Ankle (-) for fracture.    Objective:    Palpation: Tender to palpation at lateral L foot, more specifically the 4th and 5th MTP joints. Notable swelling is noted on the L left foot    ROM:  Active Ankle ROM:  L foot AROM significantly diminished due to pain and swelling. R foot AROM WNL       Strength:  L Ankle MMT:   R Ankle MMT:  Unable to effectively test strength of the L foot as pain is noted with AROM in all directions. R Ankle 5/5 grossly.    Special Tests:  Talar Tilt Test: (-)  Schulte Test: (-)  Anterior Drawer Test: (-)  Wu's Sign: (-)       Assessment/Plan:   Pt was provided a walking boot to wear with ambulation at this session and was also  educated on use of crutches to offload the L foot during walking. Pt presents with possible L foot fracture of cuneiform, but XR also negative for ruling out fracture. There does not seem to be any muscle related issues with the condition, but also still difficult to determine due to significant swelling and pain. Pt will be safe to d/c back home with crutches and walking boot, instructions were provided on icing protocol. Pt to follow-up with OPPT when swelling and pain decrease for more thorough evaluation.    Goals:   LTG 1: The patient will be independent in HEP in order to decrease pain and improve tolerance to functional activities.  STATUS: Met     PT Charges       Row Name 09/01/23 1519             Time Calculation    Start Time 1427  -MB      Stop Time 1504  -MB      Time Calculation (min) 37 min  -MB      PT Received On 09/01/23  -MB         Time Calculation- PT    Total Timed Code Minutes- PT 0 minute(s)  -MB                User Key  (r) = Recorded By, (t) = Taken By, (c) = Cosigned By      Initials Name Provider Type    Hayes Isbell, PT Physical Therapist                              Hayes Mcintosh, PT  9/1/2023

## 2023-09-05 ENCOUNTER — OFFICE VISIT (OUTPATIENT)
Dept: FAMILY MEDICINE CLINIC | Facility: CLINIC | Age: 38
End: 2023-09-05
Payer: COMMERCIAL

## 2023-09-05 VITALS
BODY MASS INDEX: 41.08 KG/M2 | HEIGHT: 73 IN | SYSTOLIC BLOOD PRESSURE: 112 MMHG | WEIGHT: 310 LBS | RESPIRATION RATE: 18 BRPM | DIASTOLIC BLOOD PRESSURE: 80 MMHG | OXYGEN SATURATION: 96 % | TEMPERATURE: 97.7 F | HEART RATE: 87 BPM

## 2023-09-05 DIAGNOSIS — Z13.29 SCREENING FOR ENDOCRINE, NUTRITIONAL, METABOLIC AND IMMUNITY DISORDER: ICD-10-CM

## 2023-09-05 DIAGNOSIS — M79.672 ACUTE PAIN OF LEFT FOOT: ICD-10-CM

## 2023-09-05 DIAGNOSIS — S99.922D FOOT INJURY, LEFT, SUBSEQUENT ENCOUNTER: ICD-10-CM

## 2023-09-05 DIAGNOSIS — N52.9 ERECTILE DYSFUNCTION, UNSPECIFIED ERECTILE DYSFUNCTION TYPE: ICD-10-CM

## 2023-09-05 DIAGNOSIS — R73.03 PREDIABETES: ICD-10-CM

## 2023-09-05 DIAGNOSIS — Z13.228 SCREENING FOR ENDOCRINE, NUTRITIONAL, METABOLIC AND IMMUNITY DISORDER: ICD-10-CM

## 2023-09-05 DIAGNOSIS — E29.1 HYPOGONADISM IN MALE: ICD-10-CM

## 2023-09-05 DIAGNOSIS — I10 PRIMARY HYPERTENSION: Primary | ICD-10-CM

## 2023-09-05 DIAGNOSIS — Z13.0 SCREENING FOR ENDOCRINE, NUTRITIONAL, METABOLIC AND IMMUNITY DISORDER: ICD-10-CM

## 2023-09-05 DIAGNOSIS — E78.2 MIXED HYPERLIPIDEMIA: ICD-10-CM

## 2023-09-05 DIAGNOSIS — Z09 HOSPITAL DISCHARGE FOLLOW-UP: ICD-10-CM

## 2023-09-05 DIAGNOSIS — Z13.29 SCREENING FOR THYROID DISORDER: ICD-10-CM

## 2023-09-05 DIAGNOSIS — Z13.21 SCREENING FOR ENDOCRINE, NUTRITIONAL, METABOLIC AND IMMUNITY DISORDER: ICD-10-CM

## 2023-09-05 PROCEDURE — 80053 COMPREHEN METABOLIC PANEL: CPT | Performed by: REGISTERED NURSE

## 2023-09-05 PROCEDURE — 85025 COMPLETE CBC W/AUTO DIFF WBC: CPT | Performed by: REGISTERED NURSE

## 2023-09-05 PROCEDURE — 83036 HEMOGLOBIN GLYCOSYLATED A1C: CPT | Performed by: REGISTERED NURSE

## 2023-09-05 PROCEDURE — 84443 ASSAY THYROID STIM HORMONE: CPT | Performed by: REGISTERED NURSE

## 2023-09-05 PROCEDURE — 82672 ASSAY OF ESTROGEN: CPT | Performed by: REGISTERED NURSE

## 2023-09-05 PROCEDURE — 80061 LIPID PANEL: CPT | Performed by: REGISTERED NURSE

## 2023-09-05 NOTE — PROGRESS NOTES
Venipuncture Blood Specimen Collection  Venipuncture performed in R ARM by Enid Kelsey MA with good hemostasis. Patient tolerated the procedure well without complications.   09/05/23   Enid Kelsey MA

## 2023-09-05 NOTE — PROGRESS NOTES
"Chief Complaint  Foot Injury (Left foot injury, injured on 8/31/23, possible fracture on top of foot. )    Subjective    History of Present Illness {CC  Problem List  Visit  Diagnosis   Encounters  Notes  Medications  Labs  Result Review Imaging  Media :23}     Stephon Martini presents to [facility] for Foot Injury (Left foot injury, injured on 8/31/23, possible fracture on top of foot. ).      History of Present Illness  Patient is a 37-year-old male patient who presents to the clinic today for 3-month follow-up of hypogonadism and erectile dysfunction, with concerns of continued left foot pain with injury x4 days. He denies chest pain, chest tightness, shortness of breath, and leg swelling with pitting edema.    In regards to left foot pain and injury, patient shares that he was working out and doing leg presses when he was on his 5th press and used his toes to pushed up instead of heel and his foot immediately started hurting. He rates his pain a 6 or 7 on pain scale. He has been taking meloxicam 7.5 mg and acetaminophen for pain which he admits it does not help. He went to the ED and xray revealed no abnormalities however, physical therapy told him his injury and appearance is consistent with a fracture. He is inquiring about a MRI.  I would be more than happy to investigate further for him today.    In regards to hypogonadism, patient shares that he wants to get his estrogen levels checked because he feels \"off.\" He currently takes testosterone weekly and has no issues with the shots.  He takes sildenafil 25 mg daily PRN but admits this is no longer helping him with his erectile dysfunction. He also complains of \"thick semen.\" He has seen [facility] in the past in [place] and inquired about a consult.      Review of Systems   Constitutional: Negative.  Negative for activity change, chills, diaphoresis, fatigue and fever.   HENT: Negative.  Negative for " "congestion, dental problem, ear pain, hearing loss, rhinorrhea, sinus pain, sore throat, tinnitus and trouble swallowing.    Eyes: Negative.  Negative for pain and visual disturbance.   Respiratory: Negative.  Negative for cough, chest tightness, shortness of breath and wheezing.    Cardiovascular: Negative.  Negative for chest pain, palpitations and leg swelling.   Gastrointestinal: Negative.  Negative for abdominal pain, diarrhea, nausea and vomiting.   Endocrine: Negative.  Negative for polydipsia, polyphagia and polyuria.   Genitourinary: Negative.  Negative for difficulty urinating, dysuria, frequency and urgency.        Complains of ED    Musculoskeletal:  Positive for arthralgias and gait problem. Negative for back pain and myalgias.        Left dorsal foot injury   Skin: Negative.  Negative for color change, pallor, rash and wound.        Left dorsal foot bruising   Allergic/Immunologic: Negative.  Negative for environmental allergies.   Neurological:  Negative for dizziness, tremors, speech difficulty, weakness, light-headedness, numbness and headaches.   Hematological: Negative.    Psychiatric/Behavioral: Negative.  Negative for agitation, confusion, decreased concentration, self-injury, sleep disturbance and suicidal ideas. The patient is not nervous/anxious.    All other systems reviewed and are negative.     Objective     Vital Signs:   /80 (BP Location: Left arm, Patient Position: Sitting, Cuff Size: Adult)   Pulse 87   Temp 97.7 °F (36.5 °C) (Temporal)   Resp 18   Ht 185.4 cm (73\")   Wt (!) 141 kg (310 lb)   SpO2 96%   BMI 40.90 kg/m²   Current Outpatient Medications on File Prior to Visit   Medication Sig Dispense Refill    albuterol sulfate  (90 Base) MCG/ACT inhaler Inhale 2 puffs Every 4 (Four) Hours As Needed for Wheezing or Shortness of Air.      Continuous Blood Gluc Sensor (FreeStyle Harjeet 3 Sensor) misc 1 each Every 14 (Fourteen) Days. 6 each 1    ibuprofen (ADVIL,MOTRIN) " "800 MG tablet Take 1 tablet by mouth Every 6 (Six) Hours As Needed for Mild Pain, Moderate Pain or Fever for up to 30 doses. 20 tablet 0    lisinopril (PRINIVIL,ZESTRIL) 20 MG tablet Take 1 tablet by mouth Daily. 90 tablet 0    pravastatin (PRAVACHOL) 80 MG tablet Take 1 tablet by mouth Daily. 90 tablet 1    Respiratory Therapy Supplies (CareTouch CPAP & BIPAP Hose) misc 1 each every night at bedtime. 4 each 2    sildenafil (Viagra) 25 MG tablet Take 1 tablet by mouth Daily As Needed for Erectile Dysfunction. 9 tablet 1    Syringe 20G X 1-1/2\" 3 ML misc 1 each 1 (One) Time Per Week. 100 each 3    Testosterone Cypionate (DEPOTESTOTERONE CYPIONATE) 200 MG/ML injection INJECT 1ML INTO THE MUSCLE AS DIRECTED EVERY 14 DAYS 2 mL 2    triamterene-hydrochlorothiazide (MAXZIDE-25) 37.5-25 MG per tablet Take 1 tablet by mouth Daily. 90 tablet 1    cyclobenzaprine (FLEXERIL) 5 MG tablet Take 1 tablet by mouth Daily As Needed for Muscle Spasms. (Patient not taking: Reported on 6/23/2023) 12 tablet 0    HYDROcodone-acetaminophen (NORCO) 7.5-325 MG per tablet Take 1 tablet by mouth Every 6 (Six) Hours As Needed for Severe Pain. (Patient not taking: Reported on 6/23/2023) 10 tablet 0    Tirzepatide (Mounjaro) 5 MG/0.5ML solution pen-injector Inject 0.5 mL under the skin into the appropriate area as directed 1 (One) Time Per Week. (Patient not taking: Reported on 6/23/2023) 2 mL 0     No current facility-administered medications on file prior to visit.        Past Medical History:   Diagnosis Date    Anxiety     Asthma     Carpal tunnel syndrome, bilateral     COVID-19     Gout 09/2022    Hyperlipidemia     Hypertension     Kidney stone     Low testosterone in male     Obesity     FANTA (obstructive sleep apnea)       History reviewed. No pertinent surgical history.   Family History   Problem Relation Age of Onset    Heart attack Mother     Hyperlipidemia Father     Hypertension Father     Stroke Father     Hyperthyroidism Sister     " Parkinsonism Maternal Grandfather     Breast cancer Paternal Grandmother     Stroke Paternal Grandfather     Nephrolithiasis Paternal Grandfather       Social History     Socioeconomic History    Marital status:    Tobacco Use    Smoking status: Former     Packs/day: 1.00     Years: 5.00     Pack years: 5.00     Types: Cigarettes     Start date:      Quit date:      Years since quittin.6    Smokeless tobacco: Never   Vaping Use    Vaping Use: Never used   Substance and Sexual Activity    Alcohol use: Not Currently     Comment: HX OF ALCOHOLISM     Drug use: Never    Sexual activity: Defer         Clinical Support on 2023   Component Date Value Ref Range Status    Testosterone, Total 2023 297.2  264.0 - 916.0 ng/dL Final    This [facility] LC/MS-MS method is currently certified by the CDC  Hormone Standardization Program (HoSt). Adult male reference  interval is based on a population of healthy nonobese males  (BMI <30) between 19 and 39 years old. judy [email or web address].al. JCEM  2017,102;8330-8267. PMID: [id number].    Testosterone, Free 2023 9.0  8.7 - 25.1 pg/mL Final   Office Visit on 2023   Component Date Value Ref Range Status    Color 2023 Yellow  Yellow, Straw, Dark Yellow, Celestina Final    Clarity, UA 2023 Clear  Clear Final    Specific Gravity  2023 1.015  1.005 - 1.030 Final    pH, Urine 2023 6.5  5.0 - 8.0 Final    Leukocytes 2023 Negative  Negative Final    Nitrite, UA 2023 Negative  Negative Final    Protein, POC 2023 Negative  Negative mg/dL Final    Glucose, UA 2023 Negative  Negative mg/dL Final    Ketones, UA 2023 Negative  Negative Final    Urobilinogen, UA 2023 0.2 E.U./dL  Normal, 0.2 E.U./dL Final    Bilirubin 2023 Negative  Negative Final    Blood, UA 2023 Negative  Negative Final    Lot Number 2023 [id number]   Final    Expiration Date 2023   Final    Microalbumin/Creatinine  Ratio 06/23/2023    Final    Unable to calculate    Creatinine, Urine 06/23/2023 67.6  mg/dL Final    Microalbumin, Urine 06/23/2023 <1.2  mg/dL Final    Hemoglobin A1C 06/23/2023 6.00 (H)  4.80 - 5.60 % Final    Glucose 06/23/2023 91  65 - 99 mg/dL Final    BUN 06/23/2023 14  6 - 20 mg/dL Final    Creatinine 06/23/2023 0.96  0.76 - 1.27 mg/dL Final    Sodium 06/23/2023 136  136 - 145 mmol/L Final    Potassium 06/23/2023 4.0  3.5 - 5.2 mmol/L Final    Chloride 06/23/2023 96 (L)  98 - 107 mmol/L Final    CO2 06/23/2023 26.4  22.0 - 29.0 mmol/L Final    Calcium 06/23/2023 9.9  8.6 - 10.5 mg/dL Final    Total Protein 06/23/2023 7.5  6.0 - 8.5 g/dL Final    Albumin 06/23/2023 4.5  3.5 - 5.2 g/dL Final    ALT (SGPT) 06/23/2023 50 (H)  1 - 41 U/L Final    AST (SGOT) 06/23/2023 39  1 - 40 U/L Final    Alkaline Phosphatase 06/23/2023 70  39 - 117 U/L Final    Total Bilirubin 06/23/2023 0.6  0.0 - 1.2 mg/dL Final    Globulin 06/23/2023 3.0  gm/dL Final    A/G Ratio 06/23/2023 1.5  g/dL Final    BUN/Creatinine Ratio 06/23/2023 14.6  7.0 - 25.0 Final    Anion Gap 06/23/2023 13.6  5.0 - 15.0 mmol/L Final    eGFR 06/23/2023 103.8  >60.0 mL/min/1.73 Final    Total Cholesterol 06/23/2023 240 (H)  0 - 200 mg/dL Final    Triglycerides 06/23/2023 216 (H)  0 - 150 mg/dL Final    HDL Cholesterol 06/23/2023 41  40 - 60 mg/dL Final    LDL Cholesterol  06/23/2023 159 (H)  0 - 100 mg/dL Final    VLDL Cholesterol 06/23/2023 40  5 - 40 mg/dL Final    LDL/HDL Ratio 06/23/2023 3.80   Final    WBC 06/23/2023 7.57  3.40 - 10.80 10*3/mm3 Final    RBC 06/23/2023 6.01 (H)  4.14 - 5.80 10*6/mm3 Final    Hemoglobin 06/23/2023 16.4  13.0 - 17.7 g/dL Final    Hematocrit 06/23/2023 49.3  37.5 - 51.0 % Final    MCV 06/23/2023 82.0  79.0 - 97.0 fL Final    MCH 06/23/2023 27.3  26.6 - 33.0 pg Final    MCHC 06/23/2023 33.3  31.5 - 35.7 g/dL Final    RDW 06/23/2023 13.4  12.3 - 15.4 % Final    RDW-SD 06/23/2023 39.2  37.0 - 54.0 fl Final    MPV 06/23/2023  9.9  6.0 - 12.0 fL Final    Platelets 06/23/2023 260  140 - 450 10*3/mm3 Final    Neutrophil % 06/23/2023 66.6  42.7 - 76.0 % Final    Lymphocyte % 06/23/2023 22.9  19.6 - 45.3 % Final    Monocyte % 06/23/2023 7.3  5.0 - 12.0 % Final    Eosinophil % 06/23/2023 1.7  0.3 - 6.2 % Final    Basophil % 06/23/2023 0.3  0.0 - 1.5 % Final    Immature Grans % 06/23/2023 1.2 (H)  0.0 - 0.5 % Final    Neutrophils, Absolute 06/23/2023 5.05  1.70 - 7.00 10*3/mm3 Final    Lymphocytes, Absolute 06/23/2023 1.73  0.70 - 3.10 10*3/mm3 Final    Monocytes, Absolute 06/23/2023 0.55  0.10 - 0.90 10*3/mm3 Final    Eosinophils, Absolute 06/23/2023 0.13  0.00 - 0.40 10*3/mm3 Final    Basophils, Absolute 06/23/2023 0.02  0.00 - 0.20 10*3/mm3 Final    Immature Grans, Absolute 06/23/2023 0.09 (H)  0.00 - 0.05 10*3/mm3 Final    nRBC 06/23/2023 0.0  0.0 - 0.2 /100 WBC Final         Physical Exam  Vitals and nursing note reviewed.   Constitutional:       Appearance: Normal appearance. He is normal weight. He is obese.   HENT:      Head: Normocephalic and atraumatic.   Eyes:      Extraocular Movements: Extraocular movements intact.   Cardiovascular:      Rate and Rhythm: Normal rate and regular rhythm.      Pulses: Normal pulses.      Heart sounds: Normal heart sounds. No murmur heard.    No friction rub. No gallop.   Pulmonary:      Effort: Pulmonary effort is normal. No respiratory distress.      Breath sounds: Normal breath sounds. No stridor. No wheezing, rhonchi or rales.   Chest:      Chest wall: No tenderness.   Abdominal:      General: Abdomen is flat. Bowel sounds are normal. There is no distension.      Palpations: Abdomen is soft. There is no mass.      Tenderness: There is no abdominal tenderness. There is no right CVA tenderness, left CVA tenderness, guarding or rebound.      Hernia: No hernia is present.   Musculoskeletal:         General: Tenderness and signs of injury present.      Left lower leg: Edema present.   Skin:     General:  Skin is warm and dry.      Capillary Refill: Capillary refill takes less than 2 seconds.      Coloration: Skin is not jaundiced or pale.      Findings: Bruising present.          Neurological:      General: No focal deficit present.      Mental Status: He is alert and oriented to person, place, and time. Mental status is at baseline.      Sensory: No sensory deficit.      Motor: No weakness.      Coordination: Coordination normal.      Gait: Gait normal.   Psychiatric:         Mood and Affect: Mood normal.         Behavior: Behavior normal.         Thought Content: Thought content normal.         Judgment: Judgment normal.        Result Review  Data Reviewed:{ Labs  Result Review  Imaging  Med Tab  Media :23}   I have reviewed this patient's chart.  I have reviewed previous labs, previous imaging, previous medications, and previous encounters with notes that were available in this patient's chart.               Assessment and Plan {CC Problem List  Visit Diagnosis  ROS  Review (Popup)  [facility]  BestPractice  Medications  SmartSets  SnapShot Encounters  Media :23}   Diagnoses and all orders for this visit:    1. Primary hypertension (Primary)  -     CBC & Differential  -     Comprehensive Metabolic Panel    2. Prediabetes  -     Comprehensive Metabolic Panel  -     Lipid Panel  -     Hemoglobin A1c    3. Mixed hyperlipidemia  -     Lipid Panel    4. Hypogonadism in male  -     Estrogens, Total  -     Ambulatory Referral to Urology    5. Erectile dysfunction, unspecified erectile dysfunction type  -     Ambulatory Referral to Urology    6. Acute pain of left foot  -     MRI Foot Left Without Contrast; Future    7. Screening for thyroid disorder  -     TSH    8. Screening for endocrine, nutritional, metabolic and immunity disorder  -     CBC & Differential    9. Foot injury, left, subsequent encounter  -     MRI Foot Left Without Contrast; Future    10. Hospital discharge  follow-up      -MRI left foot to rule out cause of continued pain.  -Labs ordered, patient declined testosterone at this time.  -no refills required at this time will call when needed.   -[facility] due to patient's change in ejaculation  -ER red flags discussed with patient including risk versus benefit and education provided.  -Follow-up with me in 4 to 6 weeks for foot pain unless improved, if improved follow-up in 3 months at next scheduled routine visit.    I spent 40 minutes caring for Stephon on this date of service. This time includes time spent by me in the following activities:preparing for the visit, reviewing tests, obtaining and/or reviewing a separately obtained history, performing a medically appropriate examination and/or evaluation , counseling and educating the patient/family/caregiver, ordering medications, tests, or procedures, referring and communicating with other health care professionals , documenting information in the medical record, independently interpreting results and communicating that information with the patient/family/caregiver, and care coordination.    Follow Up {Instructions Charge Capture  Follow-up Communications :23}     Patient was given instructions and counseling regarding his condition or for health maintenance advice. Please see specific information pulled into the AVS (placed there by myself) if appropriate.    Return in about 6 weeks (around 10/17/2023) for Recheck.            LINDA Brandt, FNP-BC

## 2023-09-06 LAB
ALBUMIN SERPL-MCNC: 4.2 G/DL (ref 3.5–5.2)
ALBUMIN/GLOB SERPL: 1.2 G/DL
ALP SERPL-CCNC: 77 U/L (ref 39–117)
ALT SERPL W P-5'-P-CCNC: 18 U/L (ref 1–41)
ANION GAP SERPL CALCULATED.3IONS-SCNC: 8.1 MMOL/L (ref 5–15)
AST SERPL-CCNC: 14 U/L (ref 1–40)
BASOPHILS # BLD AUTO: 0.02 10*3/MM3 (ref 0–0.2)
BASOPHILS NFR BLD AUTO: 0.4 % (ref 0–1.5)
BILIRUB SERPL-MCNC: <0.2 MG/DL (ref 0–1.2)
BUN SERPL-MCNC: 16 MG/DL (ref 6–20)
BUN/CREAT SERPL: 17.2 (ref 7–25)
CALCIUM SPEC-SCNC: 10.1 MG/DL (ref 8.6–10.5)
CHLORIDE SERPL-SCNC: 99 MMOL/L (ref 98–107)
CHOLEST SERPL-MCNC: 190 MG/DL (ref 0–200)
CO2 SERPL-SCNC: 28.9 MMOL/L (ref 22–29)
CREAT SERPL-MCNC: 0.93 MG/DL (ref 0.76–1.27)
DEPRECATED RDW RBC AUTO: 36.6 FL (ref 37–54)
EGFRCR SERPLBLD CKD-EPI 2021: 107.8 ML/MIN/1.73
EOSINOPHIL # BLD AUTO: 0.22 10*3/MM3 (ref 0–0.4)
EOSINOPHIL NFR BLD AUTO: 4.1 % (ref 0.3–6.2)
ERYTHROCYTE [DISTWIDTH] IN BLOOD BY AUTOMATED COUNT: 12.3 % (ref 12.3–15.4)
GLOBULIN UR ELPH-MCNC: 3.4 GM/DL
GLUCOSE SERPL-MCNC: 95 MG/DL (ref 65–99)
HBA1C MFR BLD: 5.9 % (ref 4.8–5.6)
HCT VFR BLD AUTO: 47.9 % (ref 37.5–51)
HDLC SERPL-MCNC: 32 MG/DL (ref 40–60)
HGB BLD-MCNC: 15.8 G/DL (ref 13–17.7)
IMM GRANULOCYTES # BLD AUTO: 0.09 10*3/MM3 (ref 0–0.05)
IMM GRANULOCYTES NFR BLD AUTO: 1.7 % (ref 0–0.5)
LDLC SERPL CALC-MCNC: 118 MG/DL (ref 0–100)
LDLC/HDLC SERPL: 3.54 {RATIO}
LYMPHOCYTES # BLD AUTO: 1.04 10*3/MM3 (ref 0.7–3.1)
LYMPHOCYTES NFR BLD AUTO: 19.4 % (ref 19.6–45.3)
MCH RBC QN AUTO: 27 PG (ref 26.6–33)
MCHC RBC AUTO-ENTMCNC: 33 G/DL (ref 31.5–35.7)
MCV RBC AUTO: 81.7 FL (ref 79–97)
MONOCYTES # BLD AUTO: 0.71 10*3/MM3 (ref 0.1–0.9)
MONOCYTES NFR BLD AUTO: 13.3 % (ref 5–12)
NEUTROPHILS NFR BLD AUTO: 3.27 10*3/MM3 (ref 1.7–7)
NEUTROPHILS NFR BLD AUTO: 61.1 % (ref 42.7–76)
NRBC BLD AUTO-RTO: 0 /100 WBC (ref 0–0.2)
PLATELET # BLD AUTO: 256 10*3/MM3 (ref 140–450)
PMV BLD AUTO: 9.6 FL (ref 6–12)
POTASSIUM SERPL-SCNC: 4.4 MMOL/L (ref 3.5–5.2)
PROT SERPL-MCNC: 7.6 G/DL (ref 6–8.5)
RBC # BLD AUTO: 5.86 10*6/MM3 (ref 4.14–5.8)
SODIUM SERPL-SCNC: 136 MMOL/L (ref 136–145)
TRIGL SERPL-MCNC: 224 MG/DL (ref 0–150)
TSH SERPL DL<=0.05 MIU/L-ACNC: 1.22 UIU/ML (ref 0.27–4.2)
VLDLC SERPL-MCNC: 40 MG/DL (ref 5–40)
WBC NRBC COR # BLD: 5.35 10*3/MM3 (ref 3.4–10.8)

## 2023-09-09 LAB — ESTROGEN SERPL-MCNC: 55 PG/ML (ref 56–213)

## 2023-09-12 ENCOUNTER — APPOINTMENT (OUTPATIENT)
Dept: GENERAL RADIOLOGY | Facility: HOSPITAL | Age: 38
End: 2023-09-12
Payer: COMMERCIAL

## 2023-09-12 ENCOUNTER — HOSPITAL ENCOUNTER (EMERGENCY)
Facility: HOSPITAL | Age: 38
Discharge: HOME | End: 2023-09-12
Attending: EMERGENCY MEDICINE
Payer: COMMERCIAL

## 2023-09-12 VITALS
WEIGHT: 310 LBS | TEMPERATURE: 98.3 F | BODY MASS INDEX: 41.08 KG/M2 | HEART RATE: 85 BPM | RESPIRATION RATE: 15 BRPM | HEIGHT: 73 IN | DIASTOLIC BLOOD PRESSURE: 87 MMHG | OXYGEN SATURATION: 98 % | SYSTOLIC BLOOD PRESSURE: 130 MMHG

## 2023-09-12 DIAGNOSIS — S93.602A FOOT SPRAIN, LEFT, INITIAL ENCOUNTER: Primary | ICD-10-CM

## 2023-09-12 PROCEDURE — 73630 X-RAY EXAM OF FOOT: CPT

## 2023-09-12 PROCEDURE — 99282 EMERGENCY DEPT VISIT SF MDM: CPT

## 2023-09-12 PROCEDURE — 73610 X-RAY EXAM OF ANKLE: CPT

## 2023-09-12 NOTE — ED PROVIDER NOTES
Subjective   History of Present Illness  Seen 2 weeks ago.  Had negative x-rays.  Here because pain and swelling not improving significantly.  States he was hoping to get an MRI here today.  Has outpatient MRI ordered but states he is not received notification that it has been scheduled and he has not called to verify.  States he is waiting on his PCP to get him an orthopedic appointment.  Review of Systems  See HPI  Past Medical History:   Diagnosis Date    Anxiety     Asthma     Carpal tunnel syndrome, bilateral     COVID-19     Gout 2022    Hyperlipidemia     Hypertension     Kidney stone     Low testosterone in male     Obesity     FANTA (obstructive sleep apnea)        Allergies   Allergen Reactions    Bactrim [Sulfamethoxazole-Trimethoprim] Other (See Comments)     HOT FLASH/ BURNING INSIDE    Penicillins Hives       No past surgical history on file.    Family History   Problem Relation Age of Onset    Heart attack Mother     Hyperlipidemia Father     Hypertension Father     Stroke Father     Hyperthyroidism Sister     Parkinsonism Maternal Grandfather     Breast cancer Paternal Grandmother     Stroke Paternal Grandfather     Nephrolithiasis Paternal Grandfather        Social History     Socioeconomic History    Marital status:    Tobacco Use    Smoking status: Former     Packs/day: 1.00     Years: 5.00     Pack years: 5.00     Types: Cigarettes     Start date:      Quit date:      Years since quittin.7    Smokeless tobacco: Never   Vaping Use    Vaping Use: Never used   Substance and Sexual Activity    Alcohol use: Not Currently     Comment: HX OF ALCOHOLISM     Drug use: Never    Sexual activity: Defer           Objective   Physical Exam  Swelling over dorsal surface on lateral foot.  Sensation intact.  Pulses intact.  Diffuse tenderness to palpation without bony point tenderness to palpation.  Cap refill less than 3 seconds.  Procedures           ED Course      /87 (BP Location:  "Left arm, Patient Position: Sitting)   Pulse 85   Temp 98.3 °F (36.8 °C) (Oral)   Resp 15   Ht 185.4 cm (73\")   Wt (!) 141 kg (310 lb)   SpO2 98%   BMI 40.90 kg/m²   Labs Reviewed - No data to display  Medications - No data to display  XR Ankle 3+ View Left    Result Date: 9/12/2023  Impression: Chronic findings as detailed. No acute bony process. Electronically Signed: Liza Bui MD  9/12/2023 11:24 AM EDT  Workstation ID: [id number]    XR Foot 3+ View Left    Result Date: 9/12/2023  Impression: Chronic findings as detailed. No acute bony process. Electronically Signed: Liza Bui MD  9/12/2023 11:24 AM EDT  Workstation ID: [id number]                                        Medical Decision Making  Problems Addressed:  Foot sprain, left, initial encounter: complicated acute illness or injury    Amount and/or Complexity of Data Reviewed  Radiology: ordered.    My interpretation of ankle x-ray is no acute fracture or dislocation.  See above radiology interpretation.    Repeat x-rays unremarkable for new findings.  Given orthopedic referral here.  Recommended he call to follow-up to check on getting MRI scheduled.    Final diagnoses:   Foot sprain, left, initial encounter       ED Disposition  ED Disposition       ED Disposition   Discharge    Condition   Stable    Comment   --               AWILDA Linton DPM  [address] [place] [address]  [phone]    In 3 days           Medication List      No changes were made to your prescriptions during this visit.            Fabiano Gee MD  09/12/23 1805    "

## 2023-09-12 NOTE — ED NOTES
Pt c/o left ankle pain x2 weeks. Pt reports hurting it at the gym, being seen here, and referred to ortho. Pt unable to get appt with ortho for a long time. Pt states his ankle is throbbing and still as swollen as it was two weeks ago.

## 2023-09-14 DIAGNOSIS — M79.672 ACUTE PAIN OF LEFT FOOT: Primary | ICD-10-CM

## 2023-09-14 NOTE — TELEPHONE ENCOUNTER
Caller: Stephon Martini    Relationship: Self    Best call back number: [phone]    Requested Prescriptions:   Requested Prescriptions     Pending Prescriptions Disp Refills    ibuprofen (ADVIL,MOTRIN) 800 MG tablet 20 tablet 0     Sig: Take 1 tablet by mouth Every 6 (Six) Hours As Needed for Mild Pain, Moderate Pain or Fever for up to 30 doses.        Pharmacy where request should be sent: [facility] [id number] - [phone]  - [phone] FX     Last office visit with prescribing clinician: 9/5/2023   Last telemedicine visit with prescribing clinician: Visit date not found   Next office visit with prescribing clinician: 9/29/2023     Additional details provided by patient: PATIENT IS OUT OF THIS MEDICATION NEEDS ASAP    Does the patient have less than a 3 day supply:  [x] Yes  [] No    Would you like a call back once the refill request has been completed: [] Yes [] No    If the office needs to give you a call back, can they leave a voicemail: [] Yes [] No    Florin Taylor Rep   09/14/23 11:40 EDT

## 2023-09-15 RX ORDER — IBUPROFEN 800 MG/1
800 TABLET ORAL EVERY 6 HOURS PRN
Qty: 60 TABLET | Refills: 1 | Status: SHIPPED | OUTPATIENT
Start: 2023-09-15

## 2023-09-19 ENCOUNTER — HOSPITAL ENCOUNTER (OUTPATIENT)
Dept: MRI IMAGING | Facility: HOSPITAL | Age: 38
Discharge: HOME | End: 2023-09-19
Admitting: REGISTERED NURSE
Payer: COMMERCIAL

## 2023-09-19 DIAGNOSIS — S99.922D FOOT INJURY, LEFT, SUBSEQUENT ENCOUNTER: ICD-10-CM

## 2023-09-19 DIAGNOSIS — M79.672 ACUTE PAIN OF LEFT FOOT: ICD-10-CM

## 2023-09-19 PROCEDURE — 73718 MRI LOWER EXTREMITY W/O DYE: CPT

## 2023-09-20 ENCOUNTER — TELEPHONE (OUTPATIENT)
Dept: FAMILY MEDICINE CLINIC | Facility: CLINIC | Age: 38
End: 2023-09-20

## 2023-09-20 NOTE — TELEPHONE ENCOUNTER
Hub staff attempted to follow warm transfer process and was unsuccessful     Caller: Stephon Martini    Relationship to patient: Self    Best call back number: [phone]    Patient is needing: PATIENT IS RETURNING A MISSED CALL FROM THE OFFICE.    PLEASE CONTACT PATIENT TO ADVISE.      THANKS

## 2023-09-29 ENCOUNTER — OFFICE VISIT (OUTPATIENT)
Dept: FAMILY MEDICINE CLINIC | Facility: CLINIC | Age: 38
End: 2023-09-29
Payer: COMMERCIAL

## 2023-09-29 VITALS
TEMPERATURE: 97.5 F | DIASTOLIC BLOOD PRESSURE: 84 MMHG | BODY MASS INDEX: 41.35 KG/M2 | OXYGEN SATURATION: 98 % | HEIGHT: 73 IN | SYSTOLIC BLOOD PRESSURE: 123 MMHG | WEIGHT: 312 LBS | HEART RATE: 86 BPM | RESPIRATION RATE: 18 BRPM

## 2023-09-29 DIAGNOSIS — E29.1 HYPOGONADISM IN MALE: Primary | ICD-10-CM

## 2023-09-29 DIAGNOSIS — N52.9 ERECTILE DYSFUNCTION, UNSPECIFIED ERECTILE DYSFUNCTION TYPE: ICD-10-CM

## 2023-09-29 PROCEDURE — 84403 ASSAY OF TOTAL TESTOSTERONE: CPT | Performed by: REGISTERED NURSE

## 2023-09-29 RX ORDER — SILDENAFIL 50 MG/1
50 TABLET, FILM COATED ORAL DAILY PRN
Qty: 9 TABLET | Refills: 0 | Status: SHIPPED | OUTPATIENT
Start: 2023-09-29

## 2023-09-29 NOTE — PROGRESS NOTES
Venipuncture Blood Specimen Collection  Venipuncture performed in right arm via butterfly by Izzy Mata CMA with good hemostasis. Patient tolerated the procedure well without complications.   09/29/23   Izzy Mata CMA

## 2023-09-29 NOTE — PROGRESS NOTES
"Chief Complaint  Follow-up (Would like to discuss going on mounjaro and  also discuss his  testosterone. ), Hypertension, and Hyperlipidemia    Subjective    History of Present Illness {CC  Problem List  Visit  Diagnosis   Encounters  Notes  Medications  Labs  Result Review Imaging  Media :23}     Stephon Martini presents to [facility] for Follow-up (Would like to discuss going on mounjaro and  also discuss his  testosterone. ), Hypertension, and Hyperlipidemia.      History of Present Illness  Patient is a 37 year old male presents as a follow up for hypogonadism, erectile dysfunction, and type 2 diabetes. He denies chest pain, palpitations, or shortness of breath. He denies fever, chills, and other symptoms that would suggest contagious illnesses.     In regards to hypogonadism, he report his last testosterone in June was \"normal\" but reports it is lower than 400, which he says his the level he feels the best at. He reports feeling tired all the time and reduced erectile function. He currently takes 200 mg of testosterone every 14 days. He is inquiring if this does needs to be increased based off his levels or given more often. He has a follow up with urology in October.     In regards to erectile dysfunction, he is currently taking sildenafil 25 mg daily as needed but reports he has issues maintaining an erection. He reports mild side effects, flushing right after taking, from sildenafil. He is requesting an increase in dose if possible.     In regards to mounjaro, he reports he was taking mounjaro at the \"lowest dose\" but then he could not find it locally when it was time to increase the dose. He reports his hemoglobin A1c has been decreasing with his last one on 9/5/23 at 5.9. He is continuing to diet and exercise. He has not been exercising as much due to still recovering from previous foot injury that he sees podiatry for on 11/22/23. He has been doing a low carb " "diet and has lost approximately 45 pounds. He wants to look into the possibility of going back on mounjaro in the upcoming months to help with diabetic control.          Review of Systems   Constitutional:  Positive for fatigue. Negative for activity change, chills and fever.   HENT: Negative.  Negative for congestion, dental problem, ear pain, hearing loss, rhinorrhea, sinus pain, sore throat, tinnitus and trouble swallowing.    Eyes: Negative.  Negative for pain, discharge and visual disturbance.   Respiratory: Negative.  Negative for cough, chest tightness, shortness of breath and wheezing.    Cardiovascular: Negative.  Negative for chest pain, palpitations and leg swelling.   Gastrointestinal: Negative.  Negative for abdominal pain, diarrhea, nausea and vomiting.   Endocrine: Negative.  Negative for polydipsia, polyphagia and polyuria.   Genitourinary: Negative.  Negative for difficulty urinating, dysuria, frequency and urgency.        Erectile dysfunction    Musculoskeletal: Negative.  Negative for arthralgias, back pain and myalgias.   Skin: Negative.  Negative for color change, pallor, rash and wound.   Allergic/Immunologic: Negative.  Negative for environmental allergies.   Neurological: Negative.  Negative for dizziness, speech difficulty, weakness, light-headedness, numbness and headaches.   Hematological: Negative.    Psychiatric/Behavioral: Negative.  Negative for agitation, confusion, decreased concentration, self-injury, sleep disturbance and suicidal ideas. The patient is not nervous/anxious.    All other systems reviewed and are negative.     Objective     Vital Signs:   /84 (BP Location: Left arm, Patient Position: Sitting, Cuff Size: Adult)   Pulse 86   Temp 97.5 °F (36.4 °C) (Oral)   Resp 18   Ht 185.4 cm (73\")   Wt (!) 142 kg (312 lb)   SpO2 98%   BMI 41.16 kg/m²   Current Outpatient Medications on File Prior to Visit   Medication Sig Dispense Refill    albuterol sulfate  (90 " "Base) MCG/ACT inhaler Inhale 2 puffs Every 4 (Four) Hours As Needed for Wheezing or Shortness of Air.      Continuous Blood Gluc Sensor (FreeStyle Harjeet 3 Sensor) misc 1 each Every 14 (Fourteen) Days. 6 each 1    ibuprofen (ADVIL,MOTRIN) 800 MG tablet Take 1 tablet by mouth Every 6 (Six) Hours As Needed for Mild Pain, Moderate Pain or Fever for up to 30 doses. 60 tablet 1    lisinopril (PRINIVIL,ZESTRIL) 20 MG tablet Take 1 tablet by mouth Daily. 90 tablet 0    pravastatin (PRAVACHOL) 80 MG tablet Take 1 tablet by mouth Daily. 90 tablet 1    Respiratory Therapy Supplies (CareTouch CPAP & BIPAP Hose) misc 1 each every night at bedtime. 4 each 2    Syringe 20G X 1-1/2\" 3 ML misc 1 each 1 (One) Time Per Week. 100 each 3    triamterene-hydrochlorothiazide (MAXZIDE-25) 37.5-25 MG per tablet Take 1 tablet by mouth Daily. 90 tablet 1    [DISCONTINUED] Testosterone Cypionate (DEPOTESTOTERONE CYPIONATE) 200 MG/ML injection INJECT 1ML INTO THE MUSCLE AS DIRECTED EVERY 14 DAYS 2 mL 2    cyclobenzaprine (FLEXERIL) 5 MG tablet Take 1 tablet by mouth Daily As Needed for Muscle Spasms. (Patient not taking: Reported on 6/23/2023) 12 tablet 0    HYDROcodone-acetaminophen (NORCO) 7.5-325 MG per tablet Take 1 tablet by mouth Every 6 (Six) Hours As Needed for Severe Pain. (Patient not taking: Reported on 6/23/2023) 10 tablet 0    Tirzepatide (Mounjaro) 5 MG/0.5ML solution pen-injector Inject 0.5 mL under the skin into the appropriate area as directed 1 (One) Time Per Week. (Patient not taking: Reported on 6/23/2023) 2 mL 0     No current facility-administered medications on file prior to visit.        Past Medical History:   Diagnosis Date    Anxiety     Asthma     Carpal tunnel syndrome, bilateral     COVID-19     Gout 09/2022    Hyperlipidemia     Hypertension     Kidney stone     Low testosterone in male     Obesity     FANTA (obstructive sleep apnea)       No past surgical history on file.   Family History   Problem Relation Age of " Onset    Heart attack Mother     Hyperlipidemia Father     Hypertension Father     Stroke Father     Hyperthyroidism Sister     Parkinsonism Maternal Grandfather     Breast cancer Paternal Grandmother     Stroke Paternal Grandfather     Nephrolithiasis Paternal Grandfather       Social History     Socioeconomic History    Marital status:    Tobacco Use    Smoking status: Former     Packs/day: 1.00     Years: 5.00     Pack years: 5.00     Types: Cigarettes     Start date:      Quit date:      Years since quittin.7    Smokeless tobacco: Never   Vaping Use    Vaping Use: Never used   Substance and Sexual Activity    Alcohol use: Not Currently     Comment: HX OF ALCOHOLISM     Drug use: Never    Sexual activity: Defer         Office Visit on 2023   Component Date Value Ref Range Status    Testosterone, Total 2023 109.00 (L)  249.00 - 836.00 ng/dL Final   Office Visit on 2023   Component Date Value Ref Range Status    Glucose 2023 95  65 - 99 mg/dL Final    BUN 2023 16  6 - 20 mg/dL Final    Creatinine 2023 0.93  0.76 - 1.27 mg/dL Final    Sodium 2023 136  136 - 145 mmol/L Final    Potassium 2023 4.4  3.5 - 5.2 mmol/L Final    Chloride 2023 99  98 - 107 mmol/L Final    CO2 2023 28.9  22.0 - 29.0 mmol/L Final    Calcium 2023 10.1  8.6 - 10.5 mg/dL Final    Total Protein 2023 7.6  6.0 - 8.5 g/dL Final    Albumin 2023 4.2  3.5 - 5.2 g/dL Final    ALT (SGPT) 2023 18  1 - 41 U/L Final    AST (SGOT) 2023 14  1 - 40 U/L Final    Alkaline Phosphatase 2023 77  39 - 117 U/L Final    Total Bilirubin 2023 <0.2  0.0 - 1.2 mg/dL Final    Globulin 2023 3.4  gm/dL Final    A/G Ratio 2023 1.2  g/dL Final    BUN/Creatinine Ratio 2023 17.2  7.0 - 25.0 Final    Anion Gap 2023 8.1  5.0 - 15.0 mmol/L Final    eGFR 2023 107.8  >60.0 mL/min/1.73 Final    Total Cholesterol 2023 190  0 -  200 mg/dL Final    Triglycerides 09/05/2023 224 (H)  0 - 150 mg/dL Final    HDL Cholesterol 09/05/2023 32 (L)  40 - 60 mg/dL Final    LDL Cholesterol  09/05/2023 118 (H)  0 - 100 mg/dL Final    VLDL Cholesterol 09/05/2023 40  5 - 40 mg/dL Final    LDL/HDL Ratio 09/05/2023 3.54   Final    Hemoglobin A1C 09/05/2023 5.90 (H)  4.80 - 5.60 % Final    TSH 09/05/2023 1.220  0.270 - 4.200 uIU/mL Final    Estrogen 09/05/2023 55 (L)  56 - 213 pg/mL Final                             Prepubertal            <40    WBC 09/05/2023 5.35  3.40 - 10.80 10*3/mm3 Final    RBC 09/05/2023 5.86 (H)  4.14 - 5.80 10*6/mm3 Final    Hemoglobin 09/05/2023 15.8  13.0 - 17.7 g/dL Final    Hematocrit 09/05/2023 47.9  37.5 - 51.0 % Final    MCV 09/05/2023 81.7  79.0 - 97.0 fL Final    MCH 09/05/2023 27.0  26.6 - 33.0 pg Final    MCHC 09/05/2023 33.0  31.5 - 35.7 g/dL Final    RDW 09/05/2023 12.3  12.3 - 15.4 % Final    RDW-SD 09/05/2023 36.6 (L)  37.0 - 54.0 fl Final    MPV 09/05/2023 9.6  6.0 - 12.0 fL Final    Platelets 09/05/2023 256  140 - 450 10*3/mm3 Final    Neutrophil % 09/05/2023 61.1  42.7 - 76.0 % Final    Lymphocyte % 09/05/2023 19.4 (L)  19.6 - 45.3 % Final    Monocyte % 09/05/2023 13.3 (H)  5.0 - 12.0 % Final    Eosinophil % 09/05/2023 4.1  0.3 - 6.2 % Final    Basophil % 09/05/2023 0.4  0.0 - 1.5 % Final    Immature Grans % 09/05/2023 1.7 (H)  0.0 - 0.5 % Final    Neutrophils, Absolute 09/05/2023 3.27  1.70 - 7.00 10*3/mm3 Final    Lymphocytes, Absolute 09/05/2023 1.04  0.70 - 3.10 10*3/mm3 Final    Monocytes, Absolute 09/05/2023 0.71  0.10 - 0.90 10*3/mm3 Final    Eosinophils, Absolute 09/05/2023 0.22  0.00 - 0.40 10*3/mm3 Final    Basophils, Absolute 09/05/2023 0.02  0.00 - 0.20 10*3/mm3 Final    Immature Grans, Absolute 09/05/2023 0.09 (H)  0.00 - 0.05 10*3/mm3 Final    nRBC 09/05/2023 0.0  0.0 - 0.2 /100 WBC Final         Physical Exam  Vitals and nursing note reviewed.   Constitutional:       Appearance: Normal appearance. He  is normal weight.   HENT:      Head: Normocephalic and atraumatic.      Nose: Nose normal.      Mouth/Throat:      Mouth: Mucous membranes are moist.   Eyes:      Extraocular Movements: Extraocular movements intact.   Cardiovascular:      Rate and Rhythm: Normal rate and regular rhythm.      Pulses: Normal pulses.      Heart sounds: Normal heart sounds. No murmur heard.    No friction rub. No gallop.   Pulmonary:      Effort: Pulmonary effort is normal. No respiratory distress.      Breath sounds: Normal breath sounds. No stridor. No wheezing, rhonchi or rales.   Chest:      Chest wall: No tenderness.   Abdominal:      General: Abdomen is flat. Bowel sounds are normal. There is no distension.      Palpations: Abdomen is soft. There is no mass.      Tenderness: There is no abdominal tenderness. There is no right CVA tenderness, left CVA tenderness, guarding or rebound.      Hernia: No hernia is present.   Musculoskeletal:         General: Normal range of motion.      Cervical back: Normal range of motion and neck supple.   Skin:     General: Skin is warm and dry.      Capillary Refill: Capillary refill takes less than 2 seconds.      Coloration: Skin is not jaundiced or pale.   Neurological:      General: No focal deficit present.      Mental Status: He is alert and oriented to person, place, and time. Mental status is at baseline.      Motor: No weakness.      Coordination: Coordination normal.      Gait: Gait normal.   Psychiatric:         Mood and Affect: Mood normal.         Behavior: Behavior normal.         Thought Content: Thought content normal.         Judgment: Judgment normal.        Result Review  Data Reviewed:{ Labs  Result Review  Imaging  Med Tab  Media :23}   I have reviewed this patient's chart.  I have reviewed previous labs, previous imaging, previous medications, and previous encounters with notes that were available in this patient's chart.         Assessment and Plan {CC Problem List   Visit Diagnosis  ROS  Review (Popup)  [facility]  BestPractice  Medications  SmartSets  SnapShot Encounters  Media :23}   Diagnoses and all orders for this visit:    1. Hypogonadism in male (Primary)  -     Testosterone  -     sildenafil (Viagra) 50 MG tablet; Take 1 tablet by mouth Daily As Needed for Erectile Dysfunction.  Dispense: 9 tablet; Refill: 0  -     Testosterone Cypionate (Depo-Testosterone) 200 MG/ML injection; Inject 1.5 mL into the appropriate muscle as directed by prescriber Every 14 (Fourteen) Days.  Dispense: 3 mL; Refill: 2    2. Erectile dysfunction, unspecified erectile dysfunction type  -     sildenafil (Viagra) 50 MG tablet; Take 1 tablet by mouth Daily As Needed for Erectile Dysfunction.  Dispense: 9 tablet; Refill: 0        -Increase testosterone to 300 mg every 2 weeks due to low testosterone lab result.  -Increase sildenafil 50 mg daily as needed for erectile dysfunction. Discussed with patient that we can increase more if he still us symptomatic with maintaining erections just as long as he vitals are good and he is not experiencing medication side effects.   -Order testosterone lab. Will review and if level is not close to 400, will look into increasing testosterone level until he follows up with urology.  -Follow up with urology in October.   -After discussing mounjaro with patient and criteria for insurance to pay, will wait until first of the year to reassess need per patient request.   -ER red flags discussed with patient including risk versus benefit and education provided.  -Follow-up with me in 3 months or sooner if needed.     I spent 40 minutes caring for Stephon on this date of service. This time includes time spent by me in the following activities:preparing for the visit, reviewing tests, obtaining and/or reviewing a separately obtained history, performing a medically appropriate examination and/or evaluation , counseling and educating the  patient/family/caregiver, ordering medications, tests, or procedures, referring and communicating with other health care professionals , documenting information in the medical record, independently interpreting results and communicating that information with the patient/family/caregiver, and care coordination.    Follow Up {Instructions Charge Capture  Follow-up Communications :23}     Patient was given instructions and counseling regarding his condition or for health maintenance advice. Please see specific information pulled into the AVS (placed there by myself) if appropriate.    Return in about 3 months (around 12/29/2023).            Stephon Young APRN

## 2023-09-30 LAB — TESTOST SERPL-MCNC: 109 NG/DL (ref 249–836)

## 2023-10-02 ENCOUNTER — TELEPHONE (OUTPATIENT)
Dept: FAMILY MEDICINE CLINIC | Facility: CLINIC | Age: 38
End: 2023-10-02

## 2023-10-02 RX ORDER — TESTOSTERONE CYPIONATE 200 MG/ML
300 INJECTION, SOLUTION INTRAMUSCULAR
Qty: 3 ML | Refills: 2 | Status: SHIPPED | OUTPATIENT
Start: 2023-10-02

## 2023-10-02 NOTE — TELEPHONE ENCOUNTER
Caller: Stephon Martini    Relationship: Self    Best call back number: [phone]    Caller requesting test results: PATIENT     What test was performed: BLOOD WORK    When was the test performed: 09/29/23    Where was the test performed: OFFICE     Additional notes: REQUESTED CALLBACK REGARDING RESULTS

## 2023-10-02 NOTE — TELEPHONE ENCOUNTER
Caller: Sandi Martini    Relationship to patient: Self    Best call back number: [phone]    Patient is needing:     PATIENT CALLED AND SAID HE HAS BEEN TRYING TO GET LIFE INSURANCE, AND HE IS HAVING ISSUES BECAUSE SOMETIME IN 2021 VIKY KENT HAD DIAGNOSED HIM WITH TYPE 2 DIABETES AND ALCOHOLISM AND HE SAID NEITHER OF THOSE WERE TRUE    HE SAID HE HAS DISCUSSED IT WITH SANDI YU AND HE AGREED THAT IT WAS INCORRECT    HE IS WANTING TO SEE WHO HE NEEDS TO CONTACT TO HAVE THAT CHANGED IN HIS RECORDS

## 2023-10-02 NOTE — TELEPHONE ENCOUNTER
HUB TO READ MESSAGE BELOW     Provider has not reviewed results at this time, we will call patient as soon as he had reviewed them.

## 2023-10-02 NOTE — TELEPHONE ENCOUNTER
ESTELA IS INSTRUCTED TO READ BELOW MESSAGE     LVM FOR PT HE WOULD HAVE TO CALL BILLING [phone] ABOUT THIS

## 2023-11-03 NOTE — TELEPHONE ENCOUNTER
HUB TO READ MESSAGE BELOW     Paperwork has been given to provider to fill out and sign , will call patient when this is completed.

## 2023-11-07 ENCOUNTER — TELEPHONE (OUTPATIENT)
Dept: FAMILY MEDICINE CLINIC | Facility: CLINIC | Age: 38
End: 2023-11-07

## 2023-11-07 NOTE — TELEPHONE ENCOUNTER
Hub staff attempted to follow warm transfer process and was unsuccessful     Caller: Stephon Martini    Relationship to patient: Self    Best call back number: [phone]    Patient is needing: IS PAPERWORK HE DROPPED OFF FILLED OUT AND READY TO BE PICKED UP, NEEDS AS SOON AS POSSIBLE

## 2023-12-08 ENCOUNTER — HOSPITAL ENCOUNTER (EMERGENCY)
Facility: HOSPITAL | Age: 38
Discharge: HOME | End: 2023-12-08
Attending: EMERGENCY MEDICINE
Payer: COMMERCIAL

## 2023-12-08 VITALS
HEART RATE: 89 BPM | TEMPERATURE: 98.3 F | HEIGHT: 73 IN | RESPIRATION RATE: 17 BRPM | BODY MASS INDEX: 41.08 KG/M2 | SYSTOLIC BLOOD PRESSURE: 148 MMHG | DIASTOLIC BLOOD PRESSURE: 92 MMHG | OXYGEN SATURATION: 99 % | WEIGHT: 310 LBS

## 2023-12-08 DIAGNOSIS — J06.9 VIRAL URI WITH COUGH: Primary | ICD-10-CM

## 2023-12-08 LAB
FLUAV SUBTYP SPEC NAA+PROBE: NOT DETECTED
FLUBV RNA ISLT QL NAA+PROBE: NOT DETECTED
RSV RNA NPH QL NAA+NON-PROBE: NOT DETECTED
SARS-COV-2 RNA RESP QL NAA+PROBE: NOT DETECTED
STREP A PCR: NOT DETECTED

## 2023-12-08 PROCEDURE — 96372 THER/PROPH/DIAG INJ SC/IM: CPT

## 2023-12-08 PROCEDURE — 87634 RSV DNA/RNA AMP PROBE: CPT

## 2023-12-08 PROCEDURE — 87636 SARSCOV2 & INF A&B AMP PRB: CPT | Performed by: EMERGENCY MEDICINE

## 2023-12-08 PROCEDURE — 99283 EMERGENCY DEPT VISIT LOW MDM: CPT

## 2023-12-08 PROCEDURE — 25010000002 DEXAMETHASONE PER 1 MG: Performed by: EMERGENCY MEDICINE

## 2023-12-08 PROCEDURE — 87651 STREP A DNA AMP PROBE: CPT | Performed by: EMERGENCY MEDICINE

## 2023-12-08 PROCEDURE — 25010000002 KETOROLAC TROMETHAMINE PER 15 MG: Performed by: EMERGENCY MEDICINE

## 2023-12-08 RX ORDER — NAPROXEN 500 MG/1
500 TABLET ORAL 2 TIMES DAILY PRN
Qty: 20 TABLET | Refills: 0 | Status: SHIPPED | OUTPATIENT
Start: 2023-12-08

## 2023-12-08 RX ORDER — LORATADINE 10 MG/1
10 TABLET ORAL DAILY
Qty: 7 TABLET | Refills: 0 | Status: SHIPPED | OUTPATIENT
Start: 2023-12-08

## 2023-12-08 RX ORDER — KETOROLAC TROMETHAMINE 30 MG/ML
30 INJECTION, SOLUTION INTRAMUSCULAR; INTRAVENOUS ONCE
Status: COMPLETED | OUTPATIENT
Start: 2023-12-08 | End: 2023-12-08

## 2023-12-08 RX ORDER — FLUTICASONE PROPIONATE 50 MCG
2 SPRAY, SUSPENSION (ML) NASAL DAILY
Qty: 9.9 ML | Refills: 0 | Status: SHIPPED | OUTPATIENT
Start: 2023-12-08

## 2023-12-08 RX ADMIN — DEXAMETHASONE SODIUM PHOSPHATE 10 MG: 10 INJECTION, SOLUTION INTRAMUSCULAR; INTRAVENOUS at 06:56

## 2023-12-08 RX ADMIN — KETOROLAC TROMETHAMINE 30 MG: 30 INJECTION, SOLUTION INTRAMUSCULAR; INTRAVENOUS at 06:55

## 2023-12-08 NOTE — FSED PROVIDER NOTE
Subjective   History of Present Illness      38-year-old male presents emergency department with sore throat for the past 2 days.  Is been getting worse over the past 1 day.  His wife had strep throat a week ago.  He is otherwise reporting that he has been having worsening pain when he wakes up.  A lot of coughing and some congestion.  No recent COVID infections.  Denies shortness of breath on exertion lower extremity edema ingestions or other major changes.  He is able to sleep through the night.  The pain got worse so he came in    Review of Systems    All systems negative except as otherwise mentioned in the HPI    Past Medical History:   Diagnosis Date    Anxiety     Asthma     Carpal tunnel syndrome, bilateral     COVID-19     Gout 2022    Hyperlipidemia     Hypertension     Kidney stone     Low testosterone in male     Obesity     FANTA (obstructive sleep apnea)        Allergies   Allergen Reactions    Bactrim [Sulfamethoxazole-Trimethoprim] Other (See Comments)     HOT FLASH/ BURNING INSIDE    Penicillins Hives       History reviewed. No pertinent surgical history.    Family History   Problem Relation Age of Onset    Heart attack Mother     Hyperlipidemia Father     Hypertension Father     Stroke Father     Hyperthyroidism Sister     Parkinsonism Maternal Grandfather     Breast cancer Paternal Grandmother     Stroke Paternal Grandfather     Nephrolithiasis Paternal Grandfather        Social History     Socioeconomic History    Marital status:    Tobacco Use    Smoking status: Former     Packs/day: 1.00     Years: 5.00     Additional pack years: 0.00     Total pack years: 5.00     Types: Cigarettes     Start date:      Quit date:      Years since quittin.9    Smokeless tobacco: Never   Vaping Use    Vaping Use: Never used   Substance and Sexual Activity    Alcohol use: Not Currently     Comment: HX OF ALCOHOLISM     Drug use: Never    Sexual activity: Defer           Objective   Physical  Exam    General: Alert and oriented, conversant  Eye: PERRL, EOMI, nomal conjunctiva  HENT: Normocephalic, normal hearing, moist oral mucosa    Lungs: Nonlabored respiration, no wheezing  Heart: Normal Rate, no mumurs gallops or rubs  Abdomen: Soft, Non tender, no peritoneal signs    Musculoskeletal: Normal range of motion and strength, no tenderness or swelling  Skin: Warm and dry, no alarming rashes  Neurologic: Awake, responsive, moving all extremities, no focal deficits  Psychiatric:  Cooperative, appropriate mood and affect    Procedures           ED Course                                           Medical Decision Making  Amount and/or Complexity of Data Reviewed  Labs: ordered.    Risk  Prescription drug management.    38-year-old man presents emergency department with pharyngitis.  He has had sore throat for the past 2 days.  His wife's been sick as well.  Vital signs are stable he is afebrile.  No hypoxia.  There is concern for strep throat however the swab was negative.  Awaiting the results of the COVID and flu studies.  The patient is given Decadron and naproxen.  He will be discharged with naproxen Claritin and Flonase.    Final diagnoses:   None       ED Disposition  ED Disposition       None            No follow-up provider specified.       Medication List      No changes were made to your prescriptions during this visit.                 These medications were sent to [facility] [id number] - [place], [place] - [address] - [phone]  - [phone] FX  [facility] [facility] [place] [address]      Phone: [phone]   fluticasone 50 MCG/ACT nasal spray  loratadine 10 MG tablet  naproxen 500 MG tablet

## 2023-12-12 DIAGNOSIS — E78.2 MIXED HYPERLIPIDEMIA: ICD-10-CM

## 2023-12-12 NOTE — TELEPHONE ENCOUNTER
HUB TO RELAY MESSAGE BELOW     This medication dose was discontinued on 6/29/23, new dosing was prescribed , it went from 40 mg to 80 mg.

## 2023-12-13 RX ORDER — PRAVASTATIN SODIUM 40 MG
40 TABLET ORAL DAILY
Qty: 30 TABLET | Status: CANCELLED | OUTPATIENT
Start: 2023-12-13

## 2023-12-13 RX ORDER — PRAVASTATIN SODIUM 80 MG/1
80 TABLET ORAL DAILY
Qty: 90 TABLET | Refills: 1 | Status: SHIPPED | OUTPATIENT
Start: 2023-12-13

## 2023-12-13 RX ORDER — PRAVASTATIN SODIUM 80 MG/1
80 TABLET ORAL DAILY
Qty: 90 TABLET | Refills: 1 | Status: CANCELLED | OUTPATIENT
Start: 2023-12-13

## 2023-12-13 NOTE — TELEPHONE ENCOUNTER
Caller: Stephon Martini    Relationship: Self    Best call back number: [phone]    Requested Prescriptions:   pravastatin (PRAVACHOL) 80 MG tablet  80 mg, Daily 90 DAY SUPPLY WITH REFILLS          Pharmacy where request should be sent: [facility] [id number] [address] - [phone] PH - [phone] FX     Last office visit with prescribing clinician: 9/29/2023   Last telemedicine visit with prescribing clinician: Visit date not found   Next office visit with prescribing clinician: 12/29/2023     Additional details provided by patient: WILL NEED THIS FILLED     Does the patient have less than a 3 day supply:  [x] Yes  [] No    Would you like a call back once the refill request has been completed: [] Yes [x] No    If the office needs to give you a call back, can they leave a voicemail: [] Yes [x] No    Umang Nieves   12/13/23 12:29 EST

## 2023-12-18 ENCOUNTER — TELEPHONE (OUTPATIENT)
Dept: FAMILY MEDICINE CLINIC | Facility: CLINIC | Age: 38
End: 2023-12-18

## 2023-12-18 NOTE — TELEPHONE ENCOUNTER
Caller: Stephon Martini    Relationship: Self    Best call back number: [phone]    What medication are you requesting: SOMETHING FOR THRUSH    What are your current symptoms: TONGUE IS WHITE AND HURTS    How long have you been experiencing symptoms: 2 DAYS     Have you had these symptoms before:    [x] Yes  [] No    Have you been treated for these symptoms before:   [x] Yes  [] No    If a prescription is needed, what is your preferred pharmacy and phone number: [facility] [id number] [address] - [phone]  - [phone] FX     Additional notes:

## 2023-12-26 DIAGNOSIS — I10 ESSENTIAL HYPERTENSION: ICD-10-CM

## 2023-12-27 RX ORDER — LISINOPRIL 20 MG/1
20 TABLET ORAL DAILY
Qty: 30 TABLET | Refills: 2 | Status: SHIPPED | OUTPATIENT
Start: 2023-12-27

## 2023-12-29 ENCOUNTER — OFFICE VISIT (OUTPATIENT)
Dept: FAMILY MEDICINE CLINIC | Facility: CLINIC | Age: 38
End: 2023-12-29
Payer: COMMERCIAL

## 2023-12-29 VITALS
HEIGHT: 73 IN | RESPIRATION RATE: 18 BRPM | BODY MASS INDEX: 41.75 KG/M2 | SYSTOLIC BLOOD PRESSURE: 136 MMHG | HEART RATE: 83 BPM | OXYGEN SATURATION: 96 % | WEIGHT: 315 LBS | TEMPERATURE: 98.4 F | DIASTOLIC BLOOD PRESSURE: 84 MMHG

## 2023-12-29 DIAGNOSIS — E66.01 MORBID (SEVERE) OBESITY DUE TO EXCESS CALORIES: ICD-10-CM

## 2023-12-29 DIAGNOSIS — E29.1 HYPOGONADISM IN MALE: ICD-10-CM

## 2023-12-29 DIAGNOSIS — J30.89 ENVIRONMENTAL AND SEASONAL ALLERGIES: ICD-10-CM

## 2023-12-29 DIAGNOSIS — E78.2 MIXED HYPERLIPIDEMIA: ICD-10-CM

## 2023-12-29 DIAGNOSIS — Z13.228 SCREENING FOR ENDOCRINE, METABOLIC AND IMMUNITY DISORDER: ICD-10-CM

## 2023-12-29 DIAGNOSIS — R53.83 FATIGUE, UNSPECIFIED TYPE: Primary | ICD-10-CM

## 2023-12-29 DIAGNOSIS — Z13.29 SCREENING FOR ENDOCRINE, METABOLIC AND IMMUNITY DISORDER: ICD-10-CM

## 2023-12-29 DIAGNOSIS — Z13.1 SCREENING FOR DIABETES MELLITUS: ICD-10-CM

## 2023-12-29 DIAGNOSIS — Z13.0 SCREENING FOR ENDOCRINE, METABOLIC AND IMMUNITY DISORDER: ICD-10-CM

## 2023-12-29 DIAGNOSIS — I10 PRIMARY HYPERTENSION: ICD-10-CM

## 2023-12-29 PROCEDURE — 84402 ASSAY OF FREE TESTOSTERONE: CPT | Performed by: REGISTERED NURSE

## 2023-12-29 PROCEDURE — 83540 ASSAY OF IRON: CPT | Performed by: REGISTERED NURSE

## 2023-12-29 PROCEDURE — 83036 HEMOGLOBIN GLYCOSYLATED A1C: CPT | Performed by: REGISTERED NURSE

## 2023-12-29 PROCEDURE — 82746 ASSAY OF FOLIC ACID SERUM: CPT | Performed by: REGISTERED NURSE

## 2023-12-29 PROCEDURE — 82306 VITAMIN D 25 HYDROXY: CPT | Performed by: REGISTERED NURSE

## 2023-12-29 PROCEDURE — 85025 COMPLETE CBC W/AUTO DIFF WBC: CPT | Performed by: REGISTERED NURSE

## 2023-12-29 PROCEDURE — 82607 VITAMIN B-12: CPT | Performed by: REGISTERED NURSE

## 2023-12-29 PROCEDURE — 80061 LIPID PANEL: CPT | Performed by: REGISTERED NURSE

## 2023-12-29 PROCEDURE — 84403 ASSAY OF TOTAL TESTOSTERONE: CPT | Performed by: REGISTERED NURSE

## 2023-12-29 PROCEDURE — 84466 ASSAY OF TRANSFERRIN: CPT | Performed by: REGISTERED NURSE

## 2023-12-29 PROCEDURE — 80053 COMPREHEN METABOLIC PANEL: CPT | Performed by: REGISTERED NURSE

## 2023-12-29 RX ORDER — CETIRIZINE HYDROCHLORIDE 10 MG/1
10 TABLET ORAL DAILY
Qty: 30 TABLET | Refills: 3 | Status: SHIPPED | OUTPATIENT
Start: 2023-12-29

## 2023-12-29 NOTE — PROGRESS NOTES
Venipuncture Blood Specimen Collection  Venipuncture performed in Rt arm via venipuncture by Florin Aguero with good hemostasis. Patient tolerated the procedure well without complications.   12/29/23   Florin Aguero

## 2023-12-29 NOTE — PROGRESS NOTES
Chief Complaint  Hypertension, Hyperlipidemia, and Hypogonadism    Subjective    History of Present Illness {CC  Problem List  Visit  Diagnosis   Encounters  Notes  Medications  Labs  Result Review Imaging  Media :23}     Stephon Martini presents to [facility] for Hypertension, Hyperlipidemia, and Hypogonadism.      History of Present Illness  Patient is a 38 y.o. male  presents to the clinic today for a 3-month follow-up for hypertension, hyperlipidemia, hypogonadism, and concerns of worsening obesity and fatigue and tiredness.  Patient denies any chest pain, shortness of breath, or any fevers.  Patient denies any known exposure to COVID, flu, or any other contagious illnesses.    In regards to hypertension, patient's blood pressure today is 136/84.  Patient is currently taking Maxide and lisinopril to manage his hypertension.  Patient denies any concerns with his hypertension medication or his hypertension at this time.    In regards to hyperlipidemia, patient is currently taking pravastatin to manage this.  I recommended patient follow a low-fat diet when possible.  Patient denies any concerns with his hyperlipidemia or his pravastatin at this time.    In regards to hypogonadism, patient is currently taking testosterone to manage this.  At our last lab drawl he was still little bit low and his testosterone level so we increased to 1.5 mL, which is 300 mg, every 2 weeks.  Patient is agreeable to redraw today and see how well this is working for him.  Patient also voices some fatigue and tiredness and would like to further investigate this.    In regards to worsening obesity, patient has tried to fill Wegovy in the past but has not been approved from insurance.  Patient is interested in potentially trying to Zepbound.  I will send a prescription over to pharmacy to see if this is approved.  Patient does voice that he feels very tired and fatigued often.  He would like  "to further workup why this is happening.  We discussed various labs to help get a better idea.       Review of Systems   Constitutional:  Positive for fatigue. Negative for activity change, chills and fever.   HENT: Negative.  Negative for congestion, dental problem, ear pain, hearing loss, rhinorrhea, sinus pain, sore throat, tinnitus and trouble swallowing.    Eyes: Negative.  Negative for pain and visual disturbance.   Respiratory: Negative.  Negative for cough, chest tightness, shortness of breath and wheezing.    Cardiovascular: Negative.  Negative for chest pain, palpitations and leg swelling.   Gastrointestinal: Negative.  Negative for abdominal pain, diarrhea, nausea and vomiting.   Endocrine: Negative.  Negative for polydipsia, polyphagia and polyuria.   Genitourinary: Negative.  Negative for difficulty urinating, dysuria, frequency and urgency.   Musculoskeletal: Negative.  Negative for arthralgias, back pain and myalgias.   Skin: Negative.  Negative for color change, pallor, rash and wound.   Allergic/Immunologic: Negative.  Negative for environmental allergies.   Neurological: Negative.  Negative for dizziness, speech difficulty, weakness, light-headedness, numbness and headaches.   Hematological: Negative.    Psychiatric/Behavioral: Negative.  Negative for confusion, decreased concentration, self-injury and suicidal ideas. The patient is not nervous/anxious.    All other systems reviewed and are negative.       Objective     Vital Signs:   /84 (BP Location: Right arm, Patient Position: Sitting, Cuff Size: Large Adult)   Pulse 83   Temp 98.4 °F (36.9 °C) (Infrared)   Resp 18   Ht 185.4 cm (73\")   Wt (!) 152 kg (336 lb)   SpO2 96%   BMI 44.33 kg/m²   Current Outpatient Medications on File Prior to Visit   Medication Sig Dispense Refill    albuterol sulfate  (90 Base) MCG/ACT inhaler Inhale 2 puffs Every 4 (Four) Hours As Needed for Wheezing or Shortness of Air.      Continuous Blood " "Gluc Sensor (FreeStyle Harjeet 3 Sensor) misc 1 each Every 14 (Fourteen) Days. 6 each 1    cyclobenzaprine (FLEXERIL) 5 MG tablet Take 1 tablet by mouth Daily As Needed for Muscle Spasms. 12 tablet 0    ibuprofen (ADVIL,MOTRIN) 800 MG tablet Take 1 tablet by mouth Every 6 (Six) Hours As Needed for Mild Pain, Moderate Pain or Fever for up to 30 doses. 60 tablet 1    lisinopril (PRINIVIL,ZESTRIL) 20 MG tablet TAKE 1 TABLET BY MOUTH DAILY 30 tablet 2    naproxen (NAPROSYN) 500 MG tablet Take 1 tablet by mouth 2 (Two) Times a Day As Needed for Moderate Pain. 20 tablet 0    pravastatin (PRAVACHOL) 80 MG tablet Take 1 tablet by mouth Daily. 90 tablet 1    Respiratory Therapy Supplies (CareTouch CPAP & BIPAP Hose) misc 1 each every night at bedtime. 4 each 2    sildenafil (Viagra) 50 MG tablet Take 1 tablet by mouth Daily As Needed for Erectile Dysfunction. 9 tablet 0    Syringe 20G X 1-1/2\" 3 ML misc 1 each 1 (One) Time Per Week. 100 each 3    Testosterone Cypionate (Depo-Testosterone) 200 MG/ML injection Inject 1.5 mL into the appropriate muscle as directed by prescriber Every 14 (Fourteen) Days. 3 mL 2    triamterene-hydrochlorothiazide (MAXZIDE-25) 37.5-25 MG per tablet Take 1 tablet by mouth Daily. 90 tablet 1    [DISCONTINUED] fluticasone (FLONASE) 50 MCG/ACT nasal spray 2 sprays into the nostril(s) as directed by provider Daily. 9.9 mL 0    [DISCONTINUED] HYDROcodone-acetaminophen (NORCO) 7.5-325 MG per tablet Take 1 tablet by mouth Every 6 (Six) Hours As Needed for Severe Pain. (Patient not taking: Reported on 6/23/2023) 10 tablet 0    [DISCONTINUED] loratadine (CLARITIN) 10 MG tablet Take 1 tablet by mouth Daily. 7 tablet 0    [DISCONTINUED] Tirzepatide (Mounjaro) 5 MG/0.5ML solution pen-injector Inject 0.5 mL under the skin into the appropriate area as directed 1 (One) Time Per Week. (Patient not taking: Reported on 12/29/2023) 2 mL 0     No current facility-administered medications on file prior to visit.    "     Past Medical History:   Diagnosis Date    Anxiety     Asthma     Carpal tunnel syndrome, bilateral     COVID-19     Gout 2022    Hyperlipidemia     Hypertension     Kidney stone     Low testosterone in male     Obesity     FANTA (obstructive sleep apnea)       History reviewed. No pertinent surgical history.   Family History   Problem Relation Age of Onset    Heart attack Mother     Hyperlipidemia Father     Hypertension Father     Stroke Father     Hyperthyroidism Sister     Parkinsonism Maternal Grandfather     Breast cancer Paternal Grandmother     Stroke Paternal Grandfather     Nephrolithiasis Paternal Grandfather       Social History     Socioeconomic History    Marital status:    Tobacco Use    Smoking status: Former     Packs/day: 1.00     Years: 5.00     Additional pack years: 0.00     Total pack years: 5.00     Types: Cigarettes     Start date:      Quit date:      Years since quittin.0     Passive exposure: Past    Smokeless tobacco: Never   Vaping Use    Vaping Use: Never used   Substance and Sexual Activity    Alcohol use: Not Currently     Comment: HX OF ALCOHOLISM     Drug use: Never    Sexual activity: Defer         Admission on 2023, Discharged on 2023   Component Date Value Ref Range Status    STREP A PCR 2023 Not Detected  Not Detected Final    COVID19 2023 Not Detected  Not Detected - Ref. Range Final    Influenza A PCR 2023 Not Detected  Not Detected Final    Influenza B PCR 2023 Not Detected  Not Detected Final    RSV, PCR 2023 Not Detected  Not Detected Final         Physical Exam  Vitals and nursing note reviewed.   Constitutional:       Appearance: Normal appearance. He is normal weight.   HENT:      Head: Normocephalic and atraumatic.   Cardiovascular:      Rate and Rhythm: Normal rate and regular rhythm.      Pulses: Normal pulses.      Heart sounds: Normal heart sounds. No murmur heard.     No friction rub. No gallop.    Pulmonary:      Effort: Pulmonary effort is normal. No respiratory distress.      Breath sounds: Normal breath sounds. No stridor. No wheezing, rhonchi or rales.   Chest:      Chest wall: No tenderness.   Abdominal:      General: Abdomen is flat. Bowel sounds are normal. There is no distension.      Palpations: Abdomen is soft. There is no mass.      Tenderness: There is no abdominal tenderness. There is no right CVA tenderness, left CVA tenderness, guarding or rebound.      Hernia: No hernia is present.   Skin:     General: Skin is warm and dry.      Capillary Refill: Capillary refill takes less than 2 seconds.      Coloration: Skin is not jaundiced or pale.   Neurological:      General: No focal deficit present.      Mental Status: He is alert and oriented to person, place, and time. Mental status is at baseline.      Motor: No weakness.      Coordination: Coordination normal.      Gait: Gait normal.   Psychiatric:         Mood and Affect: Mood normal.         Behavior: Behavior normal.         Thought Content: Thought content normal.         Judgment: Judgment normal.          Result Review  Data Reviewed:{ Labs  Result Review  Imaging  Med Tab  Media :23}   I have reviewed this patient's chart.  I have reviewed previous labs, previous imaging, previous medications, and previous encounters with notes that were available in this patient's chart.               Assessment and Plan {CC Problem List  Visit Diagnosis  ROS  Review (Popup)  [facility]  Quality  BestPractice  Medications  SmartSets  SnapShot Encounters  Media :23}   Diagnoses and all orders for this visit:    1. Fatigue, unspecified type (Primary)  -     Iron Profile  -     Vitamin B12 & Folate  -     Vitamin D,25-Hydroxy    2. Mixed hyperlipidemia  -     Lipid panel    3. Hypogonadism in male  -     Testosterone (Free & Total), LC / MS    4. Primary hypertension    5. Screening for diabetes mellitus  -     Hemoglobin A1c    6.  Screening for endocrine, metabolic and immunity disorder  -     CBC & Differential  -     Comprehensive Metabolic Panel    7. Environmental and seasonal allergies  -     cetirizine (zyrTEC) 10 MG tablet; Take 1 tablet by mouth Daily.  Dispense: 30 tablet; Refill: 3    8. Morbid (severe) obesity due to excess calories  -     Tirzepatide-Weight Management (ZEPBOUND) 2.5 MG/0.5ML solution auto-injector; Inject 0.5 mL under the skin into the appropriate area as directed 1 (One) Time Per Week.  Dispense: 2 mL; Refill: 0    9. Body mass index (BMI) of 40.0 to 44.9 in adult  -     Tirzepatide-Weight Management (ZEPBOUND) 2.5 MG/0.5ML solution auto-injector; Inject 0.5 mL under the skin into the appropriate area as directed 1 (One) Time Per Week.  Dispense: 2 mL; Refill: 0        -Fasting labs today.  -Zepbound for obesity control sent to pharmacy risk versus benefit and side effects discussed with patient.  -Start patient on Zyrtec for allergies.  -Other lab workup for fatigue.  -ER red flags discussed with patient including risk versus benefit and education provided.  -Follow-up with me in 3 months or sooner if needed.    I spent 40 minutes caring for Stephon on this date of service. This time includes time spent by me in the following activities:preparing for the visit, reviewing tests, obtaining and/or reviewing a separately obtained history, performing a medically appropriate examination and/or evaluation , counseling and educating the patient/family/caregiver, ordering medications, tests, or procedures, referring and communicating with other health care professionals , documenting information in the medical record, independently interpreting results and communicating that information with the patient/family/caregiver, and care coordination.    Follow Up {Instructions Charge Capture  Follow-up Communications :23}     Patient was given instructions and counseling regarding his condition or for health maintenance  advice. Please see specific information pulled into the AVS (placed there by myself) if appropriate.    Return in about 3 months (around 3/29/2024) for routine follow up.            LINDA Brandt, FNP-BC

## 2023-12-30 LAB
25(OH)D3 SERPL-MCNC: 14.7 NG/ML (ref 30–100)
ALBUMIN SERPL-MCNC: 4.6 G/DL (ref 3.5–5.2)
ALBUMIN/GLOB SERPL: 2 G/DL
ALP SERPL-CCNC: 74 U/L (ref 39–117)
ALT SERPL W P-5'-P-CCNC: 33 U/L (ref 1–41)
ANION GAP SERPL CALCULATED.3IONS-SCNC: 10 MMOL/L (ref 5–15)
AST SERPL-CCNC: 22 U/L (ref 1–40)
BASOPHILS # BLD AUTO: 0.03 10*3/MM3 (ref 0–0.2)
BASOPHILS NFR BLD AUTO: 0.6 % (ref 0–1.5)
BILIRUB SERPL-MCNC: 0.3 MG/DL (ref 0–1.2)
BUN SERPL-MCNC: 17 MG/DL (ref 6–20)
BUN/CREAT SERPL: 19.1 (ref 7–25)
CALCIUM SPEC-SCNC: 9.5 MG/DL (ref 8.6–10.5)
CHLORIDE SERPL-SCNC: 101 MMOL/L (ref 98–107)
CHOLEST SERPL-MCNC: 232 MG/DL (ref 0–200)
CO2 SERPL-SCNC: 25 MMOL/L (ref 22–29)
CREAT SERPL-MCNC: 0.89 MG/DL (ref 0.76–1.27)
DEPRECATED RDW RBC AUTO: 42.4 FL (ref 37–54)
EGFRCR SERPLBLD CKD-EPI 2021: 111.8 ML/MIN/1.73
EOSINOPHIL # BLD AUTO: 0.13 10*3/MM3 (ref 0–0.4)
EOSINOPHIL NFR BLD AUTO: 2.4 % (ref 0.3–6.2)
ERYTHROCYTE [DISTWIDTH] IN BLOOD BY AUTOMATED COUNT: 14.9 % (ref 12.3–15.4)
FOLATE SERPL-MCNC: 12.8 NG/ML (ref 4.78–24.2)
GLOBULIN UR ELPH-MCNC: 2.3 GM/DL
GLUCOSE SERPL-MCNC: 91 MG/DL (ref 65–99)
HBA1C MFR BLD: 6.1 % (ref 4.8–5.6)
HCT VFR BLD AUTO: 45.7 % (ref 37.5–51)
HDLC SERPL-MCNC: 50 MG/DL (ref 40–60)
HGB BLD-MCNC: 15.6 G/DL (ref 13–17.7)
IMM GRANULOCYTES # BLD AUTO: 0.11 10*3/MM3 (ref 0–0.05)
IMM GRANULOCYTES NFR BLD AUTO: 2 % (ref 0–0.5)
IRON 24H UR-MRATE: 81 MCG/DL (ref 59–158)
IRON SATN MFR SERPL: 18 % (ref 20–50)
LDLC SERPL CALC-MCNC: 143 MG/DL (ref 0–100)
LDLC/HDLC SERPL: 2.78 {RATIO}
LYMPHOCYTES # BLD AUTO: 1.84 10*3/MM3 (ref 0.7–3.1)
LYMPHOCYTES NFR BLD AUTO: 33.9 % (ref 19.6–45.3)
MCH RBC QN AUTO: 27.2 PG (ref 26.6–33)
MCHC RBC AUTO-ENTMCNC: 34.1 G/DL (ref 31.5–35.7)
MCV RBC AUTO: 79.8 FL (ref 79–97)
MONOCYTES # BLD AUTO: 0.4 10*3/MM3 (ref 0.1–0.9)
MONOCYTES NFR BLD AUTO: 7.4 % (ref 5–12)
NEUTROPHILS NFR BLD AUTO: 2.92 10*3/MM3 (ref 1.7–7)
NEUTROPHILS NFR BLD AUTO: 53.7 % (ref 42.7–76)
NRBC BLD AUTO-RTO: 0 /100 WBC (ref 0–0.2)
PLATELET # BLD AUTO: 236 10*3/MM3 (ref 140–450)
PMV BLD AUTO: 10.1 FL (ref 6–12)
POTASSIUM SERPL-SCNC: 4.5 MMOL/L (ref 3.5–5.2)
PROT SERPL-MCNC: 6.9 G/DL (ref 6–8.5)
RBC # BLD AUTO: 5.73 10*6/MM3 (ref 4.14–5.8)
SODIUM SERPL-SCNC: 136 MMOL/L (ref 136–145)
TIBC SERPL-MCNC: 454 MCG/DL (ref 298–536)
TRANSFERRIN SERPL-MCNC: 305 MG/DL (ref 200–360)
TRIGL SERPL-MCNC: 215 MG/DL (ref 0–150)
VIT B12 BLD-MCNC: 563 PG/ML (ref 211–946)
VLDLC SERPL-MCNC: 39 MG/DL (ref 5–40)
WBC NRBC COR # BLD AUTO: 5.43 10*3/MM3 (ref 3.4–10.8)

## 2024-01-02 ENCOUNTER — TELEPHONE (OUTPATIENT)
Dept: FAMILY MEDICINE CLINIC | Facility: CLINIC | Age: 39
End: 2024-01-02

## 2024-01-02 NOTE — TELEPHONE ENCOUNTER
SPOKE WITH PT LETTING HIM KNOW WHEN MARIBEL REVIEWS LABS WE WILL CALL WITH THE RESULTS PT VOICE UNDERSTOOD.

## 2024-01-02 NOTE — TELEPHONE ENCOUNTER
Caller: Stephon Martini    Relationship: Self    Best call back number: [phone]     What is the best time to reach you: ANY     Who are you requesting to speak with (clinical staff, provider,  specific staff member): CLINICAL    What was the call regarding: WOULD LIKE TO SPEAK TO SOMEONE TO DISCUSS MOST RECENT LAB RESULTS. SAW RESULTS IN MY CHART AND HAS ADDITIONAL QUESTIONS.

## 2024-01-05 ENCOUNTER — TELEPHONE (OUTPATIENT)
Dept: FAMILY MEDICINE CLINIC | Facility: CLINIC | Age: 39
End: 2024-01-05

## 2024-01-05 NOTE — TELEPHONE ENCOUNTER
Hub staff attempted to follow warm transfer process and was unsuccessful     Caller: Stephon Martini    Relationship to patient: Self    Best call back number:     [phone] (Mobile)       Patient is needing: UPDATE ON LABS

## 2024-01-05 NOTE — TELEPHONE ENCOUNTER
ESTELA IS INSTRUCTED TO RELAY BELOW MESSAGE     WHEN MARIBEL REVIEWS LABS WE WILL CALL WITH THE RESULTS THANK YOU

## 2024-01-05 NOTE — TELEPHONE ENCOUNTER
Caller: Stephon Martini    Relationship: Self    Best call back number:     [phone] (Mobile)       What medication are you requesting: MOUNJARO FOR  DIABETIC ONE    - DUE TO A1C NUMBERS     INSURANCE WILL NOT COVER CURRENT MED/DOSE (WEIGHT LOSS)       Have you had these symptoms before:    [x] Yes  [] No    Have you been treated for these symptoms before:   [x] Yes  [] No    If a prescription is needed, what is your preferred pharmacy and phone number:    Pharmacy: [facility] [id number] - [phone]  - [phone] FX

## 2024-01-08 LAB
TESTOST FREE SERPL-MCNC: 17.3 PG/ML (ref 8.7–25.1)
TESTOST SERPL-MCNC: 694.4 NG/DL (ref 264–916)

## 2024-01-12 NOTE — TELEPHONE ENCOUNTER
HUB IS INSTRUCTED TO RELAY BELOW MESSAGE     IF PT CALLS BACK HAVE HIM TO CALL HIS INSURANCE AND FIND OUT WHICH MEDICATION IS PREFERRED. THANK YOU

## 2024-01-16 NOTE — TELEPHONE ENCOUNTER
Name: Stephon Martini    Relationship: Self    Best Callback Number: [phone]     HUB PROVIDED THE RELAY MESSAGE FROM THE OFFICE   PATIENT VOICED UNDERSTANDING AND HAS NO FURTHER QUESTIONS AT THIS TIME    ADDITIONAL INFORMATION: PATIENT WILL CALL HIS INSURANCE AND THEN LET US KNOW

## 2024-02-13 ENCOUNTER — OFFICE VISIT (OUTPATIENT)
Dept: FAMILY MEDICINE CLINIC | Facility: CLINIC | Age: 39
End: 2024-02-13
Payer: COMMERCIAL

## 2024-02-13 VITALS
RESPIRATION RATE: 18 BRPM | HEIGHT: 73 IN | HEART RATE: 76 BPM | SYSTOLIC BLOOD PRESSURE: 139 MMHG | OXYGEN SATURATION: 97 % | TEMPERATURE: 98.6 F | BODY MASS INDEX: 41.75 KG/M2 | DIASTOLIC BLOOD PRESSURE: 81 MMHG | WEIGHT: 315 LBS

## 2024-02-13 DIAGNOSIS — N52.9 ERECTILE DYSFUNCTION, UNSPECIFIED ERECTILE DYSFUNCTION TYPE: ICD-10-CM

## 2024-02-13 DIAGNOSIS — J02.9 SORE THROAT: Primary | ICD-10-CM

## 2024-02-13 DIAGNOSIS — R73.9 HYPERGLYCEMIA: ICD-10-CM

## 2024-02-13 DIAGNOSIS — E66.01 MORBID (SEVERE) OBESITY DUE TO EXCESS CALORIES: ICD-10-CM

## 2024-02-13 DIAGNOSIS — J02.0 STREP THROAT: ICD-10-CM

## 2024-02-13 DIAGNOSIS — S10.12XA BLISTER OF THROAT: ICD-10-CM

## 2024-02-13 LAB
EXPIRATION DATE: NORMAL
EXPIRATION DATE: NORMAL
FLUAV AG UPPER RESP QL IA.RAPID: NOT DETECTED
FLUBV AG UPPER RESP QL IA.RAPID: NOT DETECTED
INTERNAL CONTROL: NORMAL
INTERNAL CONTROL: NORMAL
Lab: NORMAL
Lab: NORMAL
S PYO RRNA THROAT QL PROBE: NEGATIVE
SARS-COV-2 AG UPPER RESP QL IA.RAPID: NOT DETECTED

## 2024-02-13 RX ORDER — BLOOD-GLUCOSE SENSOR
1 EACH MISCELLANEOUS
Qty: 6 EACH | Refills: 1 | Status: SHIPPED | OUTPATIENT
Start: 2024-02-13

## 2024-02-13 RX ORDER — CLINDAMYCIN HYDROCHLORIDE 300 MG/1
300 CAPSULE ORAL 3 TIMES DAILY
Qty: 30 CAPSULE | Refills: 0 | Status: SHIPPED | OUTPATIENT
Start: 2024-02-13 | End: 2024-02-23

## 2024-02-19 ENCOUNTER — TELEPHONE (OUTPATIENT)
Dept: BARIATRICS/WEIGHT MGMT | Facility: CLINIC | Age: 39
End: 2024-02-19
Payer: COMMERCIAL

## 2024-02-19 NOTE — TELEPHONE ENCOUNTER
Reji'd VM from pt stating he tried to call his insurance regarding his Bariatric sx benefits and he needs some time of pre-sx code in order to do so. Pt requested call back.

## 2024-02-19 NOTE — TELEPHONE ENCOUNTER
Called Sandi Maritni    to discuss bariatric new patient packet      1 Please check the current certificate of coverage.   Does   No     this member have benefits for weight loss surgery for         morbid obesity if medically necessary?        1A Does the member have a requirement to complete N/A Months   SWL        a medically supervised weight management program?         If so, how long does it have to be?   need prior attempts?    Follows NA policy      2 What is the effective date of the policy?         5 Is precertification required for outpt surgery? Inpt? Outpatient     Yes       No         Inpatient  Yes    No   6 Is preauthorization/predetermination required for surgery for morbid obesity?                            11 Did patient complete benefit verification form  No       12 Current Insurance plan(s)          Insurance % coverage  NA/NA     Deductibles    Individual    Family      Deductible Met    Individual    Family    OOP Max    Individual    Family    Bariatric Lifetime Max         SPOKE TO SANDI, THERE IS AN EXCLUSION ON PLAN FOR BARIATRICS. PT LOOKING AT GETTING A NEW PLAN. WILL HOLD ON TO PACKET FOR 1 YR

## 2024-02-22 DIAGNOSIS — I10 ESSENTIAL HYPERTENSION: ICD-10-CM

## 2024-02-22 DIAGNOSIS — E78.2 MIXED HYPERLIPIDEMIA: ICD-10-CM

## 2024-02-22 RX ORDER — PRAVASTATIN SODIUM 80 MG/1
80 TABLET ORAL DAILY
Qty: 90 TABLET | Refills: 1 | Status: SHIPPED | OUTPATIENT
Start: 2024-02-22

## 2024-02-22 RX ORDER — TRIAMTERENE AND HYDROCHLOROTHIAZIDE 37.5; 25 MG/1; MG/1
1 TABLET ORAL DAILY
Qty: 90 TABLET | Refills: 1 | Status: SHIPPED | OUTPATIENT
Start: 2024-02-22

## 2024-02-22 RX ORDER — LISINOPRIL 20 MG/1
20 TABLET ORAL DAILY
Qty: 30 TABLET | Refills: 2 | Status: SHIPPED | OUTPATIENT
Start: 2024-02-22

## 2024-02-22 NOTE — TELEPHONE ENCOUNTER
Caller: Stephon Martini    Relationship: Self    Best call back number: [phone]    Requested Prescriptions:   Requested Prescriptions     Pending Prescriptions Disp Refills    triamterene-hydrochlorothiazide (MAXZIDE-25) 37.5-25 MG per tablet 90 tablet 1     Sig: Take 1 tablet by mouth Daily.    lisinopril (PRINIVIL,ZESTRIL) 20 MG tablet 30 tablet 2     Sig: Take 1 tablet by mouth Daily.    pravastatin (PRAVACHOL) 80 MG tablet 90 tablet 1     Sig: Take 1 tablet by mouth Daily.        Pharmacy where request should be sent: [id number] [address] [facility] [phone] [facility] [phone]      Last office visit with prescribing clinician: 2/13/2024   Last telemedicine visit with prescribing clinician: Visit date not found   Next office visit with prescribing clinician: 5/21/2024     Does the patient have less than a 3 day supply:  [x] Yes  [] No    Florin Mendoza Rep   02/22/24 11:16 EST

## 2024-03-06 DIAGNOSIS — E55.9 VITAMIN D DEFICIENCY: Primary | ICD-10-CM

## 2024-03-06 RX ORDER — ERGOCALCIFEROL 1.25 MG/1
50000 CAPSULE ORAL WEEKLY
Qty: 12 CAPSULE | Refills: 1 | Status: SHIPPED | OUTPATIENT
Start: 2024-03-06

## 2024-03-13 DIAGNOSIS — E78.2 MIXED HYPERLIPIDEMIA: ICD-10-CM

## 2024-03-13 DIAGNOSIS — E29.1 HYPOGONADISM IN MALE: ICD-10-CM

## 2024-03-13 NOTE — TELEPHONE ENCOUNTER
Caller: Stephon Martini    Relationship: Self    Best call back number: [phone]    Requested Prescriptions:   Requested Prescriptions     Pending Prescriptions Disp Refills    pravastatin (PRAVACHOL) 80 MG tablet 90 tablet 1     Sig: Take 1 tablet by mouth Daily.    Testosterone Cypionate (Depo-Testosterone) 200 MG/ML injection 3 mL 2     Sig: Inject 1.5 mL into the appropriate muscle as directed by prescriber Every 14 (Fourteen) Days.        Pharmacy where request should be sent: [facility] [id number] [facility] [address] [facility] [phone] [facility] [phone]      Last office visit with prescribing clinician: 2/13/2024   Last telemedicine visit with prescribing clinician: Visit date not found   Next office visit with prescribing clinician: 5/21/2024       Does the patient have less than a 3 day supply:  [x] Yes  [] No    Would you like a call back once the refill request has been completed: [] Yes [x] No    If the office needs to give you a call back, can they leave a voicemail: [] Yes [x] No    Florin Rogers Rep   03/13/24 09:50 EDT

## 2024-03-14 NOTE — TELEPHONE ENCOUNTER
Rx Refill Note  Requested Prescriptions     Pending Prescriptions Disp Refills    pravastatin (PRAVACHOL) 80 MG tablet 90 tablet 1     Sig: Take 1 tablet by mouth Daily.    Testosterone Cypionate (Depo-Testosterone) 200 MG/ML injection 3 mL 2     Sig: Inject 1.5 mL into the appropriate muscle as directed by prescriber Every 14 (Fourteen) Days.      Last office visit with prescribing clinician: 2/13/2024   Last telemedicine visit with prescribing clinician: Visit date not found   Next office visit with prescribing clinician: 5/21/2024     LAST FILLED ON 10/2/23    CONTROLLED SUBSTANCE AGREEMENT - SCAN - CONTROLLED SUBSTANCE AGREEMENT/05/27/22 (05/27/2022)     POC Urine Drug Screen, Triage (02/17/2022 15:35)     INSPECT - SCAN - INSPECT/[facility]/03.14.2024 (03/14/2024)       Ofelia Garcia MA  03/14/24, 14:19 EDT

## 2024-03-15 RX ORDER — PRAVASTATIN SODIUM 80 MG/1
80 TABLET ORAL DAILY
Qty: 90 TABLET | Refills: 1 | Status: SHIPPED | OUTPATIENT
Start: 2024-03-15

## 2024-03-15 RX ORDER — TESTOSTERONE CYPIONATE 200 MG/ML
300 INJECTION, SOLUTION INTRAMUSCULAR
Qty: 3 ML | Refills: 2 | Status: SHIPPED | OUTPATIENT
Start: 2024-03-15

## 2024-03-26 ENCOUNTER — TELEPHONE (OUTPATIENT)
Dept: FAMILY MEDICINE CLINIC | Facility: CLINIC | Age: 39
End: 2024-03-26
Payer: COMMERCIAL

## 2024-03-26 NOTE — TELEPHONE ENCOUNTER
Spoke with the patient. PA was authorization ( Ref # [id number]) but after speaking with pharmacy that is still a Copay of 550$. Discussed at length with patient and that not something that is do able at this moment. I advised patient to call [facility] and see if he would want to do their compounded medication. Pt is to call back if this is something he would like to do.

## 2024-03-26 NOTE — TELEPHONE ENCOUNTER
Caller: Stephon Martini    Relationship: Self    Best call back number: [phone]    What is the best time to reach you: ANYTIME     Who are you requesting to speak with (clinical staff, provider,  specific staff member): CLINICAL     What was the call regarding: PATIENT STATES HE IS WANTING TO KNOW AN UPDATE ON THE PRIOR AUTHORIZATION FOR THE WEIGHT LOSS INJECTION.     PATIENT IS REQUESTING A CALL BACK.

## 2025-04-17 NOTE — TELEPHONE ENCOUNTER
Called and spoke w pt. Pt stated yes she is still taking this prn to help with sleep.    Hub staff attempted to follow warm transfer process and was unsuccessful     Caller: Stephon Martini    Relationship to patient: Self    Best call back number: [phone]    Patient is needing: PT CALLING FOR STATUS UPDATE. STATED THAT HE DROPPED OFF THE PAPERWORK THAT BILLING PROVIDED HIM LAST WEEK FOR PCP TO SIGN. PLEASE ADVISE.